# Patient Record
Sex: FEMALE | Race: BLACK OR AFRICAN AMERICAN | Employment: FULL TIME | URBAN - METROPOLITAN AREA
[De-identification: names, ages, dates, MRNs, and addresses within clinical notes are randomized per-mention and may not be internally consistent; named-entity substitution may affect disease eponyms.]

---

## 2017-01-23 ENCOUNTER — OFFICE VISIT (OUTPATIENT)
Dept: ORTHOPEDIC SURGERY | Age: 47
End: 2017-01-23

## 2017-01-23 VITALS
RESPIRATION RATE: 16 BRPM | DIASTOLIC BLOOD PRESSURE: 78 MMHG | BODY MASS INDEX: 43.99 KG/M2 | WEIGHT: 233 LBS | HEART RATE: 71 BPM | SYSTOLIC BLOOD PRESSURE: 133 MMHG | HEIGHT: 61 IN

## 2017-01-23 DIAGNOSIS — M47.812 CERVICAL SPONDYLOSIS WITHOUT MYELOPATHY: ICD-10-CM

## 2017-01-23 DIAGNOSIS — M50.20 CERVICAL HERNIATED DISC: Primary | ICD-10-CM

## 2017-01-23 DIAGNOSIS — M50.30 DDD (DEGENERATIVE DISC DISEASE), CERVICAL: ICD-10-CM

## 2017-01-23 DIAGNOSIS — M54.12 CERVICAL NEURITIS: ICD-10-CM

## 2017-01-23 RX ORDER — TRAMADOL HYDROCHLORIDE 50 MG/1
TABLET ORAL
Refills: 0 | COMMUNITY
Start: 2017-01-09 | End: 2019-05-06 | Stop reason: SDUPTHER

## 2017-01-23 RX ORDER — GABAPENTIN 300 MG/1
300 CAPSULE ORAL
Qty: 90 CAP | Refills: 1 | Status: SHIPPED | OUTPATIENT
Start: 2017-01-23

## 2017-01-23 RX ORDER — TRIAMCINOLONE ACETONIDE 55 UG/1
1 SPRAY, METERED NASAL AS NEEDED
COMMUNITY
Start: 2016-07-07 | End: 2017-03-20

## 2017-01-23 RX ORDER — DIAZEPAM 5 MG/1
TABLET ORAL
Refills: 1 | COMMUNITY
Start: 2017-01-20 | End: 2017-03-20

## 2017-01-23 RX ORDER — NAPROXEN 500 MG/1
TABLET ORAL
Refills: 2 | COMMUNITY
Start: 2016-12-15 | End: 2017-03-20

## 2017-01-23 RX ORDER — TIZANIDINE HYDROCHLORIDE 6 MG/1
CAPSULE, GELATIN COATED ORAL
Refills: 1 | COMMUNITY
Start: 2017-01-03 | End: 2019-05-06 | Stop reason: SDUPTHER

## 2017-01-23 RX ORDER — GABAPENTIN 100 MG/1
CAPSULE ORAL 2 TIMES DAILY
Refills: 0 | COMMUNITY
Start: 2017-01-06 | End: 2017-03-10

## 2017-01-23 RX ORDER — TRIAMCINOLONE ACETONIDE 1 MG/G
CREAM TOPICAL
Refills: 1 | COMMUNITY
Start: 2017-01-18 | End: 2017-03-20

## 2017-01-23 NOTE — MR AVS SNAPSHOT
Visit Information Date & Time Provider Department Dept. Phone Encounter #  
 1/23/2017  8:50 AM Lena Siegel MD 4 [facility], [address] [facility] [place] [phone] [id number] Follow-up Instructions Return if symptoms worsen or fail to improve. Upcoming Health Maintenance Date Due DTaP/Tdap/Td series (1 - Tdap) 11/6/1992 PAP AKA CERVICAL CYTOLOGY 11/6/1992 INFLUENZA AGE 9 TO ADULT 8/1/2016 Allergies as of 1/23/2017  Review Complete On: 1/23/2017 By: Lena Siegel MD  
  
 Severity Noted Reaction Type Reactions Iodine  03/06/2014    Hives Oxycodone  10/10/2011    Other (comments) Penicillin G  10/10/2011    Other (comments) Current Immunizations  Never Reviewed No immunizations on file. Not reviewed this visit You Were Diagnosed With   
  
 Codes Comments Cervical herniated disc    -  Primary ICD-10-CM: M50.20 ICD-9-CM: 722.0 Cervical spondylosis without myelopathy     ICD-10-CM: P97.432 ICD-9-CM: 721.0 Cervical neuritis     ICD-10-CM: M54.12 
ICD-9-CM: 723.4 DDD (degenerative disc disease), cervical     ICD-10-CM: M50.30 ICD-9-CM: 722.4 Vitals BP Pulse Resp Height(growth percentile) Weight(growth percentile) BMI  
 133/78 (BP 1 Location: Left arm, BP Patient Position: Sitting) 71 16 5' 1\" (1.549 m) 233 lb (105.7 kg) 44.02 kg/m2 Smoking Status Never Smoker BMI and BSA Data Body Mass Index Body Surface Area 44.02 kg/m 2 2.13 m 2 Preferred Pharmacy Pharmacy Name Phone [facility] [address] [place] [address] AT [facility] [phone] Your Updated Medication List  
  
   
This list is accurate as of: 1/23/17 10:20 AM.  Always use your most recent med list.  
  
  
  
  
 diazePAM 5 mg tablet Commonly known as:  VALIUM TK ONE T PO Q 6 HOURS PRN  
  
 * gabapentin 100 mg capsule Commonly known as:  NEURONTIN  
two (2) times a day. * gabapentin 300 mg capsule Commonly known as:  NEURONTIN Take 1 Cap by mouth three (3) times daily (with meals). Indications: NEUROPATHIC PAIN  
  
 naproxen 500 mg tablet Commonly known as:  NAPROSYN  
TK 1 T PO BID PRN P  
  
 tiZANidine 6 mg capsule Commonly known as:  Elenore Piggs TK 1 C PO TID PRN  
  
 traMADol 50 mg tablet Commonly known as:  ULTRAM  
TK 1 T PO  Q 6 H PRN  
  
 * triamcinolone 55 mcg nasal inhaler Commonly known as:  NASACORT AQ  
1 Spray. * triamcinolone acetonide 0.1 % topical cream  
Commonly known as:  KENALOG  
AARON AA BID * Notice: This list has 4 medication(s) that are the same as other medications prescribed for you. Read the directions carefully, and ask your doctor or other care provider to review them with you. Prescriptions Sent to Pharmacy Refills  
 gabapentin (NEURONTIN) 300 mg capsule 1 Sig: Take 1 Cap by mouth three (3) times daily (with meals). Indications: NEUROPATHIC PAIN Class: Normal  
 Pharmacy: [facility] [address] [place] [address] [facility] Ph #: [phone] Route: Oral  
  
We Performed the Following REFERRAL TO SPINE SURGERY [UQT469 Custom] Comments: EVAL AND TREAT Follow-up Instructions Return if symptoms worsen or fail to improve. Referral Information Referral ID Referred By Referred To  
  
 [id number] Elkin Mast MD [address] [facility] [address] [facility], [address] Phone: [phone] Fax: [phone] Visits Status Start Date End Date 1 New Request 1/23/17 1/23/18 If your referral has a status of pending review or denied, additional information will be sent to support the outcome of this decision. Introducing [facility]!    
 Estefany [employer] introduces [employer] patient portal. Now you can access parts of your medical record, email your doctor's office, and request medication refills online. 1. In your internet browser, go to [email or web address] 2. Click on the First Time User? Click Here link in the Sign In box. You will see the New Member Sign Up page. 3. Enter your [email or web address] Access Code exactly as it appears below. You will not need to use this code after youve completed the sign-up process. If you do not sign up before the expiration date, you must request a new code. · [email or web address] Access Code: [email or web address] Expires: 4/23/2017  8:51 AM 
 
4. Enter the last four digits of your Social Security Number (xxxx) and Date of Birth (mm/dd/yyyy) as indicated and click Submit. You will be taken to the next sign-up page. 5. Create a [email or web address] ID. This will be your [email or web address] login ID and cannot be changed, so think of one that is secure and easy to remember. 6. Create a [email or web address] password. You can change your password at any time. 7. Enter your Password Reset Question and Answer. This can be used at a later time if you forget your password. 8. Enter your e-mail address. You will receive e-mail notification when new information is available in [address] 9. Click Sign Up. You can now view and download portions of your medical record. 10. Click the Download Summary menu link to download a portable copy of your medical information. If you have questions, please visit the Frequently Asked Questions section of the [email or web address] website. Remember, [email or web address] is NOT to be used for urgent needs. For medical emergencies, dial 911. Now available from your iPhone and Android! Please provide this summary of care documentation to your next provider. Your primary care clinician is listed as Olayinka Vigil. If you have any questions after today's visit, please call [phone].

## 2017-01-23 NOTE — PROGRESS NOTES
[facility]  [address]  [address], [place]  Phone: [phone]  Fax: [phone]        INITIAL CONSULTATION      HISTORY OF PRESENT ILLNESS:  Alex Downs is a 40 y.o. female whom is referred from Ronel Chaney NP secondary to progressive neck pain into the BUE (L>R) into the shoulders since October 2016. Patient can replicate her symptoms with cervical extension. She rates pain 4/10. Patient notes initial onset of symptoms roughly 9 years ago which has been intermittent since. No specific injury. She has flare ups every couple of months that are more frequent now. She denies hx of cervical spinal surgery or injections. Patient reports of \"temporary paralysis. \" Note from Ronel Chaney NP 1/3/17 indicating patient was seen for neck pain. Of note, patient noted improvement with medications Tramadol. Patient was also started on Prednisone and prescribed Zanaflex without relief. Referral note from Marvin Joshua PA-C dated 1/4/17 indicating patient has been referred to me to evaluate neck pain extending into the UE. ER note dated 1/8/17 indicating patient presented with neck pain into the BUE. Physical therapy notes were reviewed. She completes physical therapy without relief. Patient continues her HEP daily. She is taking Neurontin 100 mg BID which she tolerates without relief. She denies possibility of pregnancy or breastfeeding due to hx of tubal ligation and hysterectomy. Patient denies change in bowel or bladder habits. Patient denies fever, weight loss, or skin changes. She denies hx of DM. Cervical spine XR dated 12/15/16 per report revealed no significant abnormalities. Cervical spine MRI dated 1/19/17 reviewed. Per report, severe central canal stenosis and associated spinal cord compression at C3-C4 due to large disc protrusion. Elsewhere, no central canal stenosis. Degenerative changes. Subtle signal changes involving the cervical spinal cord at C3-C4. The patient is RH dominant.  reviewed. History reviewed. No pertinent past medical history. Hx c  Past Surgical History   Procedure Laterality Date    Hx  section       x 3    Hx cholecystectomy      Hx hernia repair     esarean section       x 3    Hx cholecystectomy      Hx hernia repair        Substance Use Topics    Smoking status: Never Smoker    Smokeless tobacco: Never Used    Alcohol use No     Work status: The patient is employed. Marital status: Not available. Allergies   Allergen Reactions    Iodine Hives    Oxycodone Other (comments)    Penicillin G Other (comments)            Family History   Problem Relation Age of Onset    Family history unknown: Yes         REVIEW OF SYSTEMS  Constitutional symptoms: Negative  Eyes: Negative  Ears, Nose, Throat, and Mouth: Negative  Cardiovascular: Negative  Respiratory: Negative  Genitourinary: Negative  Integumentary (Skin and/or breast): Negative  Musculoskeletal: Positive for neck pain into the BUE. Extremities: Negative for edema. Endocrine/Rheumatologic: Negative  Hematologic/Lymphatic: Negative  Allergic/Immunologic: Negative  Psychiatric: Negative       PHYSICAL EXAMINATION    Visit Vitals    /78 (BP 1 Location: Left arm, BP Patient Position: Sitting)    Pulse 71    Resp 16    Ht 5' 1\" (1.549 m)    Wt 233 lb (105.7 kg)    BMI 44.02 kg/m2       CONSTITUTIONAL: NAD, A&O x 3  HEART: Regular rate and rhythm  ABDOMEN: Positive bowel sounds, soft, nontender, and nondistended  LUNGS: Clear to auscultation bilaterally. RANGE OF MOTION: The patient has full passive range of motion in all four extremities. SENSATION: Sensation is intact to light touch throughout. MOTOR:   Straight Leg Raise: Negative, bilateral  Crawley: Negative, bilateral  Tandem Gait: Moderate LOB   Deep tendon reflexes are 0 at the brachioradialis, biceps, and triceps. Deep tendon reflexes are 0 at the knees and ankles bilaterally.      Shoulder AB/Flex Elbow Flex Wrist Ext Elbow Ext Wrist Flex Hand Intrin Tone   Right +4/5 +4/5 +4/5 +4/5 +4/5 +4/5 +4/5   Left +4/5 +4/5 +4/5 +4/5 +4/5 +4/5 +4/5              Hip Flex Knee Ext Knee Flex Ankle DF GTE Ankle PF Tone   Right +4/5 +4/5 +4/5 +4/5 +4/5 +4/5 +4/5   Left +4/5 +4/5 +4/5 +4/5 +4/5 +4/5 +4/5       ASSESSMENT   Carmen was seen today for neck pain, arm pain, leg pain, hip pain and numbness. Diagnoses and all orders for this visit:    Cervical herniated disc  -     REFERRAL TO SPINE SURGERY  -     gabapentin (NEURONTIN) 300 mg capsule; Take 1 Cap by mouth three (3) times daily (with meals). Indications: NEUROPATHIC PAIN    Cervical spondylosis without myelopathy  -     REFERRAL TO SPINE SURGERY  -     gabapentin (NEURONTIN) 300 mg capsule; Take 1 Cap by mouth three (3) times daily (with meals). Indications: NEUROPATHIC PAIN    Cervical neuritis  -     REFERRAL TO SPINE SURGERY  -     gabapentin (NEURONTIN) 300 mg capsule; Take 1 Cap by mouth three (3) times daily (with meals). Indications: NEUROPATHIC PAIN    DDD (degenerative disc disease), cervical  -     REFERRAL TO SPINE SURGERY  -     gabapentin (NEURONTIN) 300 mg capsule; Take 1 Cap by mouth three (3) times daily (with meals). Indications: NEUROPATHIC PAIN           IMPRESSIONS/RECOMMENDATIONS:  The patient presents with neck pain into the bilateral upper extremities, left worse than right. Patient has subtle signal changes note on her cervical spine MRI with loss of balance noted on tandem gait. I will be referring patient to Dr. Nohemi Luciano for surgical evaluation. Patient has been instructed to bring her cervical spine MRI films to her visit with Dr. Frankie Stewart. She has been tolerating Neurontin 100 mg BID. I will increase the dose of her Neurontin to 300 mg TID. Patient advised to call the office if intolerant to higher dose. I encourage patient to perform her HEP daily. I will see the patient back on an as-needed basis.        Written by Tang Scott, Dennis Henderson, as dictated by Lynnette Ahmadi MD  I examined the patient, reviewed and agree with the note.

## 2017-02-24 ENCOUNTER — OFFICE VISIT (OUTPATIENT)
Dept: ORTHOPEDIC SURGERY | Age: 47
End: 2017-02-24

## 2017-02-24 VITALS
RESPIRATION RATE: 18 BRPM | SYSTOLIC BLOOD PRESSURE: 136 MMHG | HEART RATE: 93 BPM | BODY MASS INDEX: 43.99 KG/M2 | TEMPERATURE: 98.4 F | DIASTOLIC BLOOD PRESSURE: 68 MMHG | OXYGEN SATURATION: 98 % | WEIGHT: 233 LBS | HEIGHT: 61 IN

## 2017-02-24 DIAGNOSIS — M48.02 CERVICAL SPINAL STENOSIS: ICD-10-CM

## 2017-02-24 DIAGNOSIS — M50.20 HNP (HERNIATED NUCLEUS PULPOSUS), CERVICAL: Primary | ICD-10-CM

## 2017-02-24 RX ORDER — PREDNISONE 20 MG/1
TABLET ORAL
Refills: 0 | COMMUNITY
Start: 2016-12-30 | End: 2017-03-10

## 2017-02-24 NOTE — PROGRESS NOTES
[address] [facility]   Pre-Surgical Worksheet    Patient: Marissa Waggoner                         MRN: [id number]     Age:  40 y.o.,      Sex: female    YOB: 1970           HECTOR: February 24, 2017  PCP: Murphy De Anda MD    Allergies   Allergen Reactions    Iodine Hives    Oxycodone Other (comments)    Penicillin G Other (comments)         ICD-10-CM ICD-9-CM    1. HNP (herniated nucleus pulposus), cervical M50.20 722.0 AMB POC XRAY, SPINE, CERVICAL; 2 OR 3   2. Cervical spinal stenosis M48.02 723.0 AMB POC XRAY, SPINE, CERVICAL; 2 OR 3       Surgery: C3/4 ARTHROPLASY. Pain Assessment   Pain Assessment  2/24/2017   Location of Pain Neck   Location Modifiers -   Severity of Pain 4   Quality of Pain Aching   Quality of Pain Comment numbness, tingling   Duration of Pain -   Frequency of Pain Constant   Aggravating Factors (No Data)   Aggravating Factors Comment increased activity   Limiting Behavior -   Relieving Factors (No Data)   Relieving Factors Comment pain med   Result of Injury No   Work-Related Injury -       Visit Vitals    /68    Pulse 93    Temp 98.4 °F (36.9 °C) (Oral)    Resp 18    Ht 5' 1\" (1.549 m)    Wt 233 lb (105.7 kg)    SpO2 98%    BMI 44.02 kg/m2       ADL Limits: Bathing and Dressing. PT STATES HER PAIN LIMITS HER MOBILITY AND CAUSES DISCOMFORT WITH CERTAIN ACTIVITIES. Spine Surgery?: No:  When N/A. Where N/A. Spinal Injections?: No:   When N/A. Where N/A. Physical Therapy?: Yes  When 3495-9559. Where UNKNOWN.    NSAID's?: YES:     Pain Medications?: Yes  Type: TRAMADOL, ZANAFLEX. In Pain Management: NO, Where: N/A    Current Outpatient Prescriptions   Medication Sig    traMADol (ULTRAM) 50 mg tablet TK 1 T PO  Q 6 H PRN    tiZANidine (ZANAFLEX) 6 mg capsule TK 1 C PO TID PRN    naproxen (NAPROSYN) 500 mg tablet TK 1 T PO BID PRN P    gabapentin (NEURONTIN) 300 mg capsule Take 1 Cap by mouth three (3) times daily (with meals). Indications: NEUROPATHIC PAIN    predniSONE (DELTASONE) 20 mg tablet     triamcinolone acetonide (KENALOG) 0.1 % topical cream AARON AA BID    gabapentin (NEURONTIN) 100 mg capsule two (2) times a day.  diazePAM (VALIUM) 5 mg tablet TK ONE T PO Q 6 HOURS PRN    triamcinolone (NASACORT AQ) 55 mcg nasal inhaler 1 Spray. No current facility-administered medications for this visit. History reviewed. No pertinent past medical history.     Past Surgical History:   Procedure Laterality Date    HX  SECTION      x 3    HX CHOLECYSTECTOMY      HX HERNIA REPAIR         Social History     Social History    Marital status: UNKNOWN     Spouse name: N/A    Number of children: N/A    Years of education: N/A     Social History Main Topics    Smoking status: Never Smoker    Smokeless tobacco: Never Used    Alcohol use No    Drug use: No    Sexual activity: Not Asked     Other Topics Concern    None     Social History Narrative

## 2017-02-24 NOTE — PROGRESS NOTES
without assistive device    [facility]  [address], [address] [facility], [address] [place] [address] Street  Phone: [phone]  Fax: [phone]  INITIAL CONSULTATION  Patient: Shayla Proctor                MRN: [id number]       SSN: [id number]  YOB: 1970        AGE: 40 y.o. SEX: female  Body mass index is 44.02 kg/(m^2). PCP: Madiha Simpson MD  02/24/17    Chief Complaint   Patient presents with    Neck Pain     neck pain eval    Referral / Consult         HISTORY OF PRESENT ILLNESS, RADIOGRAPHS, and PLAN:         HISTORY OF PRESENT ILLNESS:  Ms. Leidy Macdonald is seen today at the request of Dr. Suresh Kapoor and Dr. Madiha Simpson. Ms. Leidy Macdonald is a 28-year-old female who works for the [employer]. She has had since October an onset of neck pain with left greater than right arm pain, loss of dexterity, balance, and numbness and tingling. The pain has progressed despite medications and therapy. MRIs have been done, which disclose a large extruded disc herniation at C3-4 with cord compression and myelomalacia. The patient is referred for consideration of surgical intervention. PHYSICAL EXAMINATION:  Physical exam demonstrates antalgic range of motion of her cervical spine, weakness and numbness in her hands, left worse than right, loss of dexterity, and poor tandem gait. There is no hyperreflexia. Biceps, triceps, and some intrinsic weakness on the left. No Crawleys or Babinski. RADIOGRAPHS:  Radiographs, by report, demonstrate a large extruded disc herniation at C3-4 with myelomalacia. The films were not available for my direct review, and I have requested them. ASSESSMENT/PLAN: We have a patient with an extruded disc herniation at C3-4 with cord compression. I am requesting the films for my direct review. Her exam is consistent with a cervical disc herniation with myelopathic findings.   I discussed the studies findings and her physical exam findings and her history with her. Given that I agree with the radiologists finding, she would be a candidate for a cervical decompression and cervical disc arthroplasty at C3-4. I discussed arthroplasty versus fusion, as well as the natural history of cervical cord compression with myelomalacia due to disc herniation. Risks, benefits, complications, and alternatives to surgery were discussed, and the patient consents to surgery. We will proceed pending my ultimate review of the actual studies. cc: Vinicio Arnold M.D. History reviewed. No pertinent past medical history. Family History   Problem Relation Age of Onset    Family history unknown: Yes       Current Outpatient Prescriptions   Medication Sig Dispense Refill    traMADol (ULTRAM) 50 mg tablet TK 1 T PO  Q 6 H PRN  0    tiZANidine (ZANAFLEX) 6 mg capsule TK 1 C PO TID PRN  1    naproxen (NAPROSYN) 500 mg tablet TK 1 T PO BID PRN P  2    gabapentin (NEURONTIN) 300 mg capsule Take 1 Cap by mouth three (3) times daily (with meals). Indications: NEUROPATHIC PAIN 90 Cap 1    predniSONE (DELTASONE) 20 mg tablet   0    triamcinolone acetonide (KENALOG) 0.1 % topical cream AARON AA BID  1    gabapentin (NEURONTIN) 100 mg capsule two (2) times a day.  0    diazePAM (VALIUM) 5 mg tablet TK ONE T PO Q 6 HOURS PRN  1    triamcinolone (NASACORT AQ) 55 mcg nasal inhaler 1 Spray. Allergies   Allergen Reactions    Iodine Hives    Oxycodone Other (comments)    Penicillin G Other (comments)       Past Surgical History:   Procedure Laterality Date    HX  SECTION      x 3    HX CHOLECYSTECTOMY      HX HERNIA REPAIR         History reviewed. No pertinent past medical history. Social History     Social History    Marital status: UNKNOWN     Spouse name: N/A    Number of children: N/A    Years of education: N/A     Occupational History    Not on file.      Social History Main Topics    Smoking status: Never Smoker    Smokeless tobacco: Never Used    Alcohol use No    Drug use: No    Sexual activity: Not on file     Other Topics Concern    Not on file     Social History Narrative         REVIEW OF SYSTEMS:   CONSTITUTIONAL SYMPTOMS:  Negative. EYES:  Negative. EARS, NOSE, THROAT AND MOUTH:  Negative. CARDIOVASCULAR:  Negative. RESPIRATORY:  Negative. GENITOURINARY: Negative. GASTROINTESTINAL:  Negative. INTEGUMENTARY (SKIN AND/OR BREAST):  Negative. MUSCULOSKELETAL: Per HPI.   ENDOCRINE/RHEUMATOLOGIC:  Negative. NEUROLOGICAL:  Per HPI. HEMATOLOGIC/LYMPHATIC:  Negative. ALLERGIC/IMMUNOLOGIC:  Negative. PSYCHIATRIC:  Negative. PHYSICAL EXAMINATION:   Visit Vitals    /68    Pulse 93    Temp 98.4 °F (36.9 °C) (Oral)    Resp 18    Ht 5' 1\" (1.549 m)    Wt 233 lb (105.7 kg)    SpO2 98%    BMI 44.02 kg/m2    PAIN SCALE: 4/10    CONSTITUTIONAL: The patient is in no apparent distress and is alert and oriented x 3. Obese. HEENT: Normocephalic. Hearing grossly intact. NECK: Supple and symmetric. no tenderness, or masses were felt. RESPIRATORY: No labored breathing. CARDIOVASCULAR: The carotid pulses were normal. Peripheral pulses were 2+. CHEST: Normal AP diameter and normal contour without any kyphoscoliosis. LYMPHATIC: No lymphadenopathy was appreciated in the neck, axillae or groin. SKIN:  Negative for scars, rashes, lesions, or ulcers on the right upper, right lower, left upper, left lower and trunk. NEUROLOGICAL: Alert and oriented x 3. Ambulation without assistive device. FWB. EXTREMITIES:  See musculoskeletal.  MUSCULOSKELETAL:   Head and Neck:  Neck pain that goes into LUE. Balance issues. Negative for misalignment, asymmetry, crepitation, defects, tenderness masses or effusions.  Left Upper Extremity: Pain. Paresthesia in hand. Drops objects. Inspection, percussion and palpation preformed. Crawleys sign is negative.  Right Upper Extremity: Paresthesia in hand.  Drops objects. Inspection, percussion and palpation preformed. Crawleys sign is negative.  Spine, Ribs and Pelvis: Inspection, percussion and palpation preformed. Negative for misalignment, asymmetry, crepitation, defects, tenderness masses or effusions.  Left Lower Extremity: Inspection, percussion and palpation preformed. Negative straight leg raise.  Right Lower Extremity: Inspection, percussion and palpation preformed. Negative straight leg raise. SPINE EXAM:     Cervical spine: Neck is midline. Normal muscle tone. No focal atrophy is noted. ASSESSMENT    ICD-10-CM ICD-9-CM    1. HNP (herniated nucleus pulposus), cervical M50.20 722.0 AMB POC XRAY, SPINE, CERVICAL; 2 OR 3   2.  Cervical spinal stenosis M48.02 723.0 AMB POC XRAY, SPINE, CERVICAL; 2 OR 3       Written by Kamilla Ortiz, as dictated by Joseph Galvan MD.

## 2017-03-09 NOTE — H&P
Pre-Admission History and Physical    Patient: Farideh Staton   MRN: [id number]   SSN: [id number]   YOB: 1970   Age: 40 y.o. Sex: female     Patient scheduled for: C3-4 arthroplasty. Date of surgery: 3/16/17. Location of surgery: [facility]. Surgeon: Kam Sheehan MD    HPI:  Farideh Staton is a 40 y.o. female with neck pain with left greater than right arm pain, loss of dexterity, balance, and numbness and tingling. The pain has progressed despite medications and therapy. He reports a pain level of 4/10. MRI demonstrates a large extruded disc herniation at C3-4 with myelomalacia. This patient has failed the presurgical conservative treatments  including physical therapy and medications. Pain has impacted the patient's functional ability to bathe and dress as the pain limits her mobility. She is being admitted for surgical intervention. No past medical history on file. Social History     Social History    Marital status: UNKNOWN     Spouse name: N/A    Number of children: N/A    Years of education: N/A     Social History Main Topics    Smoking status: Never Smoker    Smokeless tobacco: Never Used    Alcohol use No    Drug use: No    Sexual activity: Not on file     Other Topics Concern    Not on file     Social History Narrative     Past Surgical History:   Procedure Laterality Date    HX  SECTION      x 3    HX CHOLECYSTECTOMY      HX HERNIA REPAIR       Family History   Problem Relation Age of Onset    Family history unknown:  Yes     Allergies   Allergen Reactions    Iodine Hives    Oxycodone Other (comments)    Penicillin G Other (comments)     Current Outpatient Prescriptions   Medication Sig Dispense Refill    predniSONE (DELTASONE) 20 mg tablet   0    triamcinolone acetonide (KENALOG) 0.1 % topical cream AARON AA BID  1    traMADol (ULTRAM) 50 mg tablet TK 1 T PO  Q 6 H PRN  0    tiZANidine (ZANAFLEX) 6 mg capsule TK 1 C PO TID PRN  1  naproxen (NAPROSYN) 500 mg tablet TK 1 T PO BID PRN P  2    gabapentin (NEURONTIN) 100 mg capsule two (2) times a day.  0    diazePAM (VALIUM) 5 mg tablet TK ONE T PO Q 6 HOURS PRN  1    triamcinolone (NASACORT AQ) 55 mcg nasal inhaler 1 Spray.  gabapentin (NEURONTIN) 300 mg capsule Take 1 Cap by mouth three (3) times daily (with meals). Indications: NEUROPATHIC PAIN 90 Cap 1       ROS:  Denies chills, fever,night sweats,  bowel or bladder dysfunction, unexplained weight loss/weight gain, chest pain, sob or anxiety. Physical Examination    Gen: Well developed, well nourished 40 y.o. female    /68    Pulse 93    Temp 98.4 °F (36.9 °C) (Oral)    Resp 18    Ht 5' 1\" (1.549 m)    Wt 233 lb (105.7 kg)    SpO2 98%    BMI 44.02 kg/m2    PAIN SCALE: 4/10     CONSTITUTIONAL: The patient is in no apparent distress and is alert and oriented x 3. Obese. HEENT: Normocephalic. Hearing grossly intact. NECK: Supple and symmetric. no tenderness, or masses were felt. RESPIRATORY: No labored breathing. CARDIOVASCULAR: The carotid pulses were normal. Peripheral pulses were 2+. CHEST: Normal AP diameter and normal contour without any kyphoscoliosis. LYMPHATIC: No lymphadenopathy was appreciated in the neck, axillae or groin. SKIN: Negative for scars, rashes, lesions, or ulcers on the right upper, right lower, left upper, left lower and trunk. NEUROLOGICAL: Alert and oriented x 3. Ambulation without assistive device. FWB. EXTREMITIES: See musculoskeletal.  MUSCULOSKELETAL:  · Head and Neck: Neck pain that goes into LUE. Balance issues. Negative for misalignment, asymmetry, crepitation, defects, tenderness masses or effusions. · Left Upper Extremity: Pain. Paresthesia in hand. Drops objects. Inspection, percussion and palpation preformed. Crawleys sign is negative. · Right Upper Extremity: Paresthesia in hand. Drops objects. Inspection, percussion and palpation preformed.  Crawleys sign is negative. · Spine, Ribs and Pelvis: Inspection, percussion and palpation preformed. Negative for misalignment, asymmetry, crepitation, defects, tenderness masses or effusions. · Left Lower Extremity: Inspection, percussion and palpation preformed. Negative straight leg raise. · Right Lower Extremity: Inspection, percussion and palpation preformed. Negative straight leg raise.        SPINE EXAM:      Cervical spine: Neck is midline. Normal muscle tone. No focal atrophy is noted.              Assessment and Plan    Due to the pt's persistent symptoms unrelieved by conservative measure Andi Hansen is being admitted to [facility] to undergo surgical intervention. The post-operative plan of care consists of physical therapy, home health and a 2 week f/u office visit. We are pending medical clearance by Dr. Naif Everett. The risks, benefits, complications and alternatives to surgery have been discussed in detail with the patient. The patient understands and agrees to proceed.      Deisy Perez NP-C dictating for Imani Butler MD

## 2017-03-10 ENCOUNTER — HOSPITAL ENCOUNTER (OUTPATIENT)
Dept: PREADMISSION TESTING | Age: 47
Discharge: HOME | End: 2017-03-10
Payer: COMMERCIAL

## 2017-03-10 DIAGNOSIS — Z01.818 PRE-OP EXAM: ICD-10-CM

## 2017-03-10 LAB
ALBUMIN SERPL BCP-MCNC: 3.5 G/DL (ref 3.4–5)
ALBUMIN/GLOB SERPL: 1 {RATIO} (ref 0.8–1.7)
ALP SERPL-CCNC: 86 U/L (ref 45–117)
ALT SERPL-CCNC: 16 U/L (ref 13–56)
ANION GAP BLD CALC-SCNC: 4 MMOL/L (ref 3–18)
AST SERPL W P-5'-P-CCNC: 14 U/L (ref 15–37)
BILIRUB SERPL-MCNC: 0.3 MG/DL (ref 0.2–1)
BUN SERPL-MCNC: 9 MG/DL (ref 7–18)
BUN/CREAT SERPL: 15 (ref 12–20)
CALCIUM SERPL-MCNC: 8 MG/DL (ref 8.5–10.1)
CHLORIDE SERPL-SCNC: 102 MMOL/L (ref 100–108)
CO2 SERPL-SCNC: 32 MMOL/L (ref 21–32)
CREAT SERPL-MCNC: 0.6 MG/DL (ref 0.6–1.3)
ERYTHROCYTE [DISTWIDTH] IN BLOOD BY AUTOMATED COUNT: 14.8 % (ref 11.6–14.5)
GLOBULIN SER CALC-MCNC: 3.6 G/DL (ref 2–4)
GLUCOSE SERPL-MCNC: 89 MG/DL (ref 74–99)
HCT VFR BLD AUTO: 37.4 % (ref 35–45)
HGB BLD-MCNC: 11.6 G/DL (ref 12–16)
MCH RBC QN AUTO: 23.6 PG (ref 24–34)
MCHC RBC AUTO-ENTMCNC: 31 G/DL (ref 31–37)
MCV RBC AUTO: 76 FL (ref 74–97)
PLATELET # BLD AUTO: 234 K/UL (ref 135–420)
PMV BLD AUTO: 9.9 FL (ref 9.2–11.8)
POTASSIUM SERPL-SCNC: 3.4 MMOL/L (ref 3.5–5.5)
PROT SERPL-MCNC: 7.1 G/DL (ref 6.4–8.2)
RBC # BLD AUTO: 4.92 M/UL (ref 4.2–5.3)
SODIUM SERPL-SCNC: 138 MMOL/L (ref 136–145)
WBC # BLD AUTO: 4 K/UL (ref 4.6–13.2)

## 2017-03-10 PROCEDURE — 80053 COMPREHEN METABOLIC PANEL: CPT | Performed by: ORTHOPAEDIC SURGERY

## 2017-03-10 PROCEDURE — 85027 COMPLETE CBC AUTOMATED: CPT | Performed by: ORTHOPAEDIC SURGERY

## 2017-03-10 PROCEDURE — 93005 ELECTROCARDIOGRAM TRACING: CPT

## 2017-03-10 PROCEDURE — 36415 COLL VENOUS BLD VENIPUNCTURE: CPT | Performed by: ORTHOPAEDIC SURGERY

## 2017-03-11 LAB
ATRIAL RATE: 70 BPM
CALCULATED P AXIS, ECG09: 49 DEGREES
CALCULATED R AXIS, ECG10: 8 DEGREES
CALCULATED T AXIS, ECG11: 41 DEGREES
DIAGNOSIS, 93000: NORMAL
P-R INTERVAL, ECG05: 166 MS
Q-T INTERVAL, ECG07: 384 MS
QRS DURATION, ECG06: 86 MS
QTC CALCULATION (BEZET), ECG08: 414 MS
VENTRICULAR RATE, ECG03: 70 BPM

## 2017-03-15 ENCOUNTER — ANESTHESIA EVENT (OUTPATIENT)
Dept: SURGERY | Age: 47
End: 2017-03-15
Payer: COMMERCIAL

## 2017-03-16 ENCOUNTER — SURGERY (OUTPATIENT)
Age: 47
End: 2017-03-16

## 2017-03-16 ENCOUNTER — DOCUMENTATION ONLY (OUTPATIENT)
Dept: ORTHOPEDIC SURGERY | Age: 47
End: 2017-03-16

## 2017-03-16 ENCOUNTER — HOSPITAL ENCOUNTER (OUTPATIENT)
Age: 47
Setting detail: OBSERVATION
Discharge: HOME | End: 2017-03-17
Attending: ORTHOPAEDIC SURGERY | Admitting: ORTHOPAEDIC SURGERY
Payer: COMMERCIAL

## 2017-03-16 ENCOUNTER — APPOINTMENT (OUTPATIENT)
Dept: GENERAL RADIOLOGY | Age: 47
End: 2017-03-16
Attending: ORTHOPAEDIC SURGERY
Payer: COMMERCIAL

## 2017-03-16 ENCOUNTER — ANESTHESIA (OUTPATIENT)
Dept: SURGERY | Age: 47
End: 2017-03-16
Payer: COMMERCIAL

## 2017-03-16 LAB — HCG UR QL: NEGATIVE

## 2017-03-16 PROCEDURE — 76010000131 HC OR TIME 2 TO 2.5 HR: Performed by: ORTHOPAEDIC SURGERY

## 2017-03-16 PROCEDURE — 74011250636 HC RX REV CODE- 250/636

## 2017-03-16 PROCEDURE — 76060000035 HC ANESTHESIA 2 TO 2.5 HR: Performed by: ORTHOPAEDIC SURGERY

## 2017-03-16 PROCEDURE — 99218 HC RM OBSERVATION: CPT

## 2017-03-16 PROCEDURE — 74011250637 HC RX REV CODE- 250/637: Performed by: ORTHOPAEDIC SURGERY

## 2017-03-16 PROCEDURE — 77030012893: Performed by: ORTHOPAEDIC SURGERY

## 2017-03-16 PROCEDURE — 77030014005 HC SPNG HEMSTAT GEL CARD -B: Performed by: ORTHOPAEDIC SURGERY

## 2017-03-16 PROCEDURE — 77030013079 HC BLNKT BAIR HGGR 3M -A: Performed by: ANESTHESIOLOGY

## 2017-03-16 PROCEDURE — 77030027138 HC INCENT SPIROMETER -A

## 2017-03-16 PROCEDURE — 77030032575 HC DISC CERV MOBI-C ZIMM -K1: Performed by: ORTHOPAEDIC SURGERY

## 2017-03-16 PROCEDURE — 74011250636 HC RX REV CODE- 250/636: Performed by: NURSE ANESTHETIST, CERTIFIED REGISTERED

## 2017-03-16 PROCEDURE — 77030020782 HC GWN BAIR PAWS FLX 3M -B: Performed by: ORTHOPAEDIC SURGERY

## 2017-03-16 PROCEDURE — 77030011640 HC PAD GRND REM COVD -A: Performed by: ORTHOPAEDIC SURGERY

## 2017-03-16 PROCEDURE — 74011000258 HC RX REV CODE- 258: Performed by: NURSE PRACTITIONER

## 2017-03-16 PROCEDURE — 77030004402 HC BUR NEUR STRY -C: Performed by: ORTHOPAEDIC SURGERY

## 2017-03-16 PROCEDURE — 74011000258 HC RX REV CODE- 258: Performed by: ORTHOPAEDIC SURGERY

## 2017-03-16 PROCEDURE — 74011250636 HC RX REV CODE- 250/636: Performed by: NURSE PRACTITIONER

## 2017-03-16 PROCEDURE — 77030032490 HC SLV COMPR SCD KNE COVD -B: Performed by: ORTHOPAEDIC SURGERY

## 2017-03-16 PROCEDURE — 77030030163 HC BN WAX J&J -A: Performed by: ORTHOPAEDIC SURGERY

## 2017-03-16 PROCEDURE — 74011250637 HC RX REV CODE- 250/637: Performed by: NURSE ANESTHETIST, CERTIFIED REGISTERED

## 2017-03-16 PROCEDURE — 74011000250 HC RX REV CODE- 250: Performed by: ORTHOPAEDIC SURGERY

## 2017-03-16 PROCEDURE — 77030033263 HC DRSG MEPILEX 16-48IN BORD MOLN -B: Performed by: ORTHOPAEDIC SURGERY

## 2017-03-16 PROCEDURE — 74011000250 HC RX REV CODE- 250

## 2017-03-16 PROCEDURE — 77030003029 HC SUT VCRL J&J -B: Performed by: ORTHOPAEDIC SURGERY

## 2017-03-16 PROCEDURE — 77030010507 HC ADH SKN DERMBND J&J -B: Performed by: ORTHOPAEDIC SURGERY

## 2017-03-16 PROCEDURE — 77030019908 HC STETH ESOPH SIMS -A: Performed by: ANESTHESIOLOGY

## 2017-03-16 PROCEDURE — 77030018836 HC SOL IRR NACL ICUM -A: Performed by: ORTHOPAEDIC SURGERY

## 2017-03-16 PROCEDURE — 74011250636 HC RX REV CODE- 250/636: Performed by: ORTHOPAEDIC SURGERY

## 2017-03-16 PROCEDURE — 77030002933 HC SUT MCRYL J&J -A: Performed by: ORTHOPAEDIC SURGERY

## 2017-03-16 PROCEDURE — L0172 CERV COL SR FOAM 2PC PRE OTS: HCPCS | Performed by: ORTHOPAEDIC SURGERY

## 2017-03-16 PROCEDURE — 76210000016 HC OR PH I REC 1 TO 1.5 HR: Performed by: ORTHOPAEDIC SURGERY

## 2017-03-16 PROCEDURE — 97162 PT EVAL MOD COMPLEX 30 MIN: CPT

## 2017-03-16 PROCEDURE — 77030011267 HC ELECTRD BLD COVD -A: Performed by: ORTHOPAEDIC SURGERY

## 2017-03-16 PROCEDURE — 81025 URINE PREGNANCY TEST: CPT

## 2017-03-16 PROCEDURE — 77030008683 HC TU ET CUF COVD -A: Performed by: ANESTHESIOLOGY

## 2017-03-16 PROCEDURE — 74011000272 HC RX REV CODE- 272: Performed by: ORTHOPAEDIC SURGERY

## 2017-03-16 DEVICE — DISC ARTIFICIAL W13XH5XL15MM CERV CO CHROM MOLYBDENUM ALLY: Type: IMPLANTABLE DEVICE | Site: ANTERIOR CERVICAL | Status: FUNCTIONAL

## 2017-03-16 RX ORDER — VANCOMYCIN HYDROCHLORIDE 1 G/20ML
INJECTION, POWDER, LYOPHILIZED, FOR SOLUTION INTRAVENOUS AS NEEDED
Status: DISCONTINUED | OUTPATIENT
Start: 2017-03-16 | End: 2017-03-16 | Stop reason: HOSPADM

## 2017-03-16 RX ORDER — HYDROMORPHONE HYDROCHLORIDE 1 MG/ML
1 INJECTION, SOLUTION INTRAMUSCULAR; INTRAVENOUS; SUBCUTANEOUS
Status: DISCONTINUED | OUTPATIENT
Start: 2017-03-16 | End: 2017-03-17 | Stop reason: HOSPADM

## 2017-03-16 RX ORDER — DEXAMETHASONE SODIUM PHOSPHATE 100 MG/10ML
10 INJECTION INTRAMUSCULAR; INTRAVENOUS EVERY 6 HOURS
Status: DISCONTINUED | OUTPATIENT
Start: 2017-03-16 | End: 2017-03-16 | Stop reason: SDUPTHER

## 2017-03-16 RX ORDER — NALOXONE HYDROCHLORIDE 0.4 MG/ML
0.4 INJECTION, SOLUTION INTRAMUSCULAR; INTRAVENOUS; SUBCUTANEOUS AS NEEDED
Status: DISCONTINUED | OUTPATIENT
Start: 2017-03-16 | End: 2017-03-16 | Stop reason: HOSPADM

## 2017-03-16 RX ORDER — PREGABALIN 50 MG/1
50 CAPSULE ORAL
Status: DISCONTINUED | OUTPATIENT
Start: 2017-03-16 | End: 2017-03-16 | Stop reason: HOSPADM

## 2017-03-16 RX ORDER — SODIUM CHLORIDE 0.9 % (FLUSH) 0.9 %
5-10 SYRINGE (ML) INJECTION AS NEEDED
Status: DISCONTINUED | OUTPATIENT
Start: 2017-03-16 | End: 2017-03-17 | Stop reason: HOSPADM

## 2017-03-16 RX ORDER — SODIUM CHLORIDE 0.9 % (FLUSH) 0.9 %
5-10 SYRINGE (ML) INJECTION AS NEEDED
Status: DISCONTINUED | OUTPATIENT
Start: 2017-03-16 | End: 2017-03-16 | Stop reason: HOSPADM

## 2017-03-16 RX ORDER — FENTANYL CITRATE 50 UG/ML
INJECTION, SOLUTION INTRAMUSCULAR; INTRAVENOUS AS NEEDED
Status: DISCONTINUED | OUTPATIENT
Start: 2017-03-16 | End: 2017-03-16 | Stop reason: HOSPADM

## 2017-03-16 RX ORDER — SODIUM CHLORIDE 0.9 % (FLUSH) 0.9 %
5-10 SYRINGE (ML) INJECTION EVERY 8 HOURS
Status: DISCONTINUED | OUTPATIENT
Start: 2017-03-16 | End: 2017-03-17 | Stop reason: HOSPADM

## 2017-03-16 RX ORDER — GABAPENTIN 300 MG/1
300 CAPSULE ORAL
Status: DISCONTINUED | OUTPATIENT
Start: 2017-03-16 | End: 2017-03-17 | Stop reason: HOSPADM

## 2017-03-16 RX ORDER — SODIUM CHLORIDE 0.9 % (FLUSH) 0.9 %
5-10 SYRINGE (ML) INJECTION EVERY 8 HOURS
Status: DISCONTINUED | OUTPATIENT
Start: 2017-03-16 | End: 2017-03-16 | Stop reason: HOSPADM

## 2017-03-16 RX ORDER — DIPHENHYDRAMINE HYDROCHLORIDE 50 MG/ML
12.5 INJECTION, SOLUTION INTRAMUSCULAR; INTRAVENOUS
Status: DISCONTINUED | OUTPATIENT
Start: 2017-03-16 | End: 2017-03-17 | Stop reason: HOSPADM

## 2017-03-16 RX ORDER — ALBUTEROL SULFATE 0.83 MG/ML
2.5 SOLUTION RESPIRATORY (INHALATION) AS NEEDED
Status: DISCONTINUED | OUTPATIENT
Start: 2017-03-16 | End: 2017-03-16 | Stop reason: HOSPADM

## 2017-03-16 RX ORDER — KETOROLAC TROMETHAMINE 30 MG/ML
INJECTION, SOLUTION INTRAMUSCULAR; INTRAVENOUS AS NEEDED
Status: DISCONTINUED | OUTPATIENT
Start: 2017-03-16 | End: 2017-03-16 | Stop reason: HOSPADM

## 2017-03-16 RX ORDER — TRAMADOL HYDROCHLORIDE 50 MG/1
50 TABLET ORAL
Status: DISCONTINUED | OUTPATIENT
Start: 2017-03-16 | End: 2017-03-17 | Stop reason: HOSPADM

## 2017-03-16 RX ORDER — LIDOCAINE HYDROCHLORIDE 20 MG/ML
INJECTION, SOLUTION EPIDURAL; INFILTRATION; INTRACAUDAL; PERINEURAL AS NEEDED
Status: DISCONTINUED | OUTPATIENT
Start: 2017-03-16 | End: 2017-03-16 | Stop reason: HOSPADM

## 2017-03-16 RX ORDER — FAMOTIDINE 20 MG/1
20 TABLET, FILM COATED ORAL ONCE
Status: COMPLETED | OUTPATIENT
Start: 2017-03-16 | End: 2017-03-16

## 2017-03-16 RX ORDER — SODIUM CHLORIDE, SODIUM LACTATE, POTASSIUM CHLORIDE, CALCIUM CHLORIDE 600; 310; 30; 20 MG/100ML; MG/100ML; MG/100ML; MG/100ML
50 INJECTION, SOLUTION INTRAVENOUS CONTINUOUS
Status: DISCONTINUED | OUTPATIENT
Start: 2017-03-16 | End: 2017-03-16 | Stop reason: HOSPADM

## 2017-03-16 RX ORDER — HYDROMORPHONE HYDROCHLORIDE 4 MG/1
4 TABLET ORAL
Status: DISCONTINUED | OUTPATIENT
Start: 2017-03-16 | End: 2017-03-17 | Stop reason: HOSPADM

## 2017-03-16 RX ORDER — NEOSTIGMINE METHYLSULFATE 1 MG/ML
INJECTION INTRAVENOUS AS NEEDED
Status: DISCONTINUED | OUTPATIENT
Start: 2017-03-16 | End: 2017-03-16 | Stop reason: HOSPADM

## 2017-03-16 RX ORDER — PROPOFOL 10 MG/ML
INJECTION, EMULSION INTRAVENOUS AS NEEDED
Status: DISCONTINUED | OUTPATIENT
Start: 2017-03-16 | End: 2017-03-16 | Stop reason: HOSPADM

## 2017-03-16 RX ORDER — ONDANSETRON 2 MG/ML
INJECTION INTRAMUSCULAR; INTRAVENOUS AS NEEDED
Status: DISCONTINUED | OUTPATIENT
Start: 2017-03-16 | End: 2017-03-16 | Stop reason: HOSPADM

## 2017-03-16 RX ORDER — SODIUM CHLORIDE 9 MG/ML
INJECTION, SOLUTION INTRAVENOUS
Status: DISCONTINUED | OUTPATIENT
Start: 2017-03-16 | End: 2017-03-16 | Stop reason: HOSPADM

## 2017-03-16 RX ORDER — DEXTROSE, SODIUM CHLORIDE, AND POTASSIUM CHLORIDE 5; .45; .15 G/100ML; G/100ML; G/100ML
25 INJECTION INTRAVENOUS CONTINUOUS
Status: DISCONTINUED | OUTPATIENT
Start: 2017-03-16 | End: 2017-03-17 | Stop reason: HOSPADM

## 2017-03-16 RX ORDER — DIPHENHYDRAMINE HCL 25 MG
25 CAPSULE ORAL
Status: DISCONTINUED | OUTPATIENT
Start: 2017-03-16 | End: 2017-03-17 | Stop reason: HOSPADM

## 2017-03-16 RX ORDER — GLYCOPYRROLATE 0.2 MG/ML
INJECTION INTRAMUSCULAR; INTRAVENOUS AS NEEDED
Status: DISCONTINUED | OUTPATIENT
Start: 2017-03-16 | End: 2017-03-16 | Stop reason: HOSPADM

## 2017-03-16 RX ORDER — HYDROMORPHONE HYDROCHLORIDE 2 MG/ML
0.5 INJECTION, SOLUTION INTRAMUSCULAR; INTRAVENOUS; SUBCUTANEOUS
Status: DISCONTINUED | OUTPATIENT
Start: 2017-03-16 | End: 2017-03-16 | Stop reason: SDUPTHER

## 2017-03-16 RX ORDER — CELECOXIB 400 MG/1
400 CAPSULE ORAL
Status: COMPLETED | OUTPATIENT
Start: 2017-03-16 | End: 2017-03-16

## 2017-03-16 RX ORDER — MIDAZOLAM HYDROCHLORIDE 1 MG/ML
INJECTION, SOLUTION INTRAMUSCULAR; INTRAVENOUS AS NEEDED
Status: DISCONTINUED | OUTPATIENT
Start: 2017-03-16 | End: 2017-03-16 | Stop reason: HOSPADM

## 2017-03-16 RX ORDER — DEXAMETHASONE SODIUM PHOSPHATE 4 MG/ML
INJECTION, SOLUTION INTRA-ARTICULAR; INTRALESIONAL; INTRAMUSCULAR; INTRAVENOUS; SOFT TISSUE AS NEEDED
Status: DISCONTINUED | OUTPATIENT
Start: 2017-03-16 | End: 2017-03-16 | Stop reason: HOSPADM

## 2017-03-16 RX ORDER — ROCURONIUM BROMIDE 10 MG/ML
INJECTION, SOLUTION INTRAVENOUS AS NEEDED
Status: DISCONTINUED | OUTPATIENT
Start: 2017-03-16 | End: 2017-03-16 | Stop reason: HOSPADM

## 2017-03-16 RX ORDER — DIPHENHYDRAMINE HYDROCHLORIDE 50 MG/ML
12.5 INJECTION, SOLUTION INTRAMUSCULAR; INTRAVENOUS
Status: DISCONTINUED | OUTPATIENT
Start: 2017-03-16 | End: 2017-03-16 | Stop reason: HOSPADM

## 2017-03-16 RX ORDER — HYDROMORPHONE HYDROCHLORIDE 2 MG/ML
INJECTION, SOLUTION INTRAMUSCULAR; INTRAVENOUS; SUBCUTANEOUS AS NEEDED
Status: DISCONTINUED | OUTPATIENT
Start: 2017-03-16 | End: 2017-03-16 | Stop reason: HOSPADM

## 2017-03-16 RX ORDER — FAMOTIDINE 10 MG/ML
20 INJECTION INTRAVENOUS EVERY 12 HOURS
Status: DISCONTINUED | OUTPATIENT
Start: 2017-03-16 | End: 2017-03-17 | Stop reason: HOSPADM

## 2017-03-16 RX ORDER — NALOXONE HYDROCHLORIDE 0.4 MG/ML
0.4 INJECTION, SOLUTION INTRAMUSCULAR; INTRAVENOUS; SUBCUTANEOUS AS NEEDED
Status: DISCONTINUED | OUTPATIENT
Start: 2017-03-16 | End: 2017-03-17 | Stop reason: HOSPADM

## 2017-03-16 RX ORDER — SUCCINYLCHOLINE CHLORIDE 20 MG/ML
INJECTION INTRAMUSCULAR; INTRAVENOUS AS NEEDED
Status: DISCONTINUED | OUTPATIENT
Start: 2017-03-16 | End: 2017-03-16 | Stop reason: HOSPADM

## 2017-03-16 RX ORDER — ONDANSETRON 2 MG/ML
4 INJECTION INTRAMUSCULAR; INTRAVENOUS ONCE
Status: DISCONTINUED | OUTPATIENT
Start: 2017-03-16 | End: 2017-03-16 | Stop reason: HOSPADM

## 2017-03-16 RX ORDER — LORAZEPAM 2 MG/ML
1 INJECTION INTRAMUSCULAR
Status: DISCONTINUED | OUTPATIENT
Start: 2017-03-16 | End: 2017-03-17 | Stop reason: HOSPADM

## 2017-03-16 RX ORDER — SODIUM CHLORIDE, SODIUM LACTATE, POTASSIUM CHLORIDE, CALCIUM CHLORIDE 600; 310; 30; 20 MG/100ML; MG/100ML; MG/100ML; MG/100ML
75 INJECTION, SOLUTION INTRAVENOUS CONTINUOUS
Status: DISCONTINUED | OUTPATIENT
Start: 2017-03-16 | End: 2017-03-16 | Stop reason: HOSPADM

## 2017-03-16 RX ORDER — ONDANSETRON 2 MG/ML
4 INJECTION INTRAMUSCULAR; INTRAVENOUS
Status: DISCONTINUED | OUTPATIENT
Start: 2017-03-16 | End: 2017-03-17 | Stop reason: HOSPADM

## 2017-03-16 RX ORDER — DIAZEPAM 5 MG/1
5 TABLET ORAL
Status: DISCONTINUED | OUTPATIENT
Start: 2017-03-16 | End: 2017-03-17 | Stop reason: HOSPADM

## 2017-03-16 RX ADMIN — THROMBIN, TOPICAL (RECOMBINANT) 5000 UNITS: KIT at 08:59

## 2017-03-16 RX ADMIN — SODIUM CHLORIDE 1000 MG: 900 INJECTION, SOLUTION INTRAVENOUS at 07:54

## 2017-03-16 RX ADMIN — GELATIN ABSORBABLE SPONGE SIZE 100 1 EACH: MISC at 08:58

## 2017-03-16 RX ADMIN — ROCURONIUM BROMIDE 25 MG: 10 INJECTION, SOLUTION INTRAVENOUS at 08:36

## 2017-03-16 RX ADMIN — HYDROMORPHONE HYDROCHLORIDE 0.5 MG: 2 INJECTION, SOLUTION INTRAMUSCULAR; INTRAVENOUS; SUBCUTANEOUS at 10:23

## 2017-03-16 RX ADMIN — PROPOFOL 200 MG: 10 INJECTION, EMULSION INTRAVENOUS at 08:28

## 2017-03-16 RX ADMIN — FENTANYL CITRATE 100 MCG: 50 INJECTION, SOLUTION INTRAMUSCULAR; INTRAVENOUS at 08:56

## 2017-03-16 RX ADMIN — GABAPENTIN 300 MG: 300 CAPSULE ORAL at 17:19

## 2017-03-16 RX ADMIN — Medication 10 ML: at 22:59

## 2017-03-16 RX ADMIN — SUCCINYLCHOLINE CHLORIDE 140 MG: 20 INJECTION INTRAMUSCULAR; INTRAVENOUS at 08:28

## 2017-03-16 RX ADMIN — HYDROMORPHONE HYDROCHLORIDE 1 MG: 1 INJECTION, SOLUTION INTRAMUSCULAR; INTRAVENOUS; SUBCUTANEOUS at 18:48

## 2017-03-16 RX ADMIN — VANCOMYCIN HYDROCHLORIDE 1 G: 1 INJECTION, POWDER, LYOPHILIZED, FOR SOLUTION INTRAVENOUS at 08:59

## 2017-03-16 RX ADMIN — MIDAZOLAM HYDROCHLORIDE 2 MG: 1 INJECTION, SOLUTION INTRAMUSCULAR; INTRAVENOUS at 08:22

## 2017-03-16 RX ADMIN — DEXAMETHASONE SODIUM PHOSPHATE 10 MG: 4 INJECTION, SOLUTION INTRA-ARTICULAR; INTRALESIONAL; INTRAMUSCULAR; INTRAVENOUS; SOFT TISSUE at 23:55

## 2017-03-16 RX ADMIN — GLYCOPYRROLATE 0.6 MG: 0.2 INJECTION INTRAMUSCULAR; INTRAVENOUS at 10:23

## 2017-03-16 RX ADMIN — Medication 10 ML: at 17:20

## 2017-03-16 RX ADMIN — PREGABALIN 50 MG: 50 CAPSULE ORAL at 07:39

## 2017-03-16 RX ADMIN — ONDANSETRON 4 MG: 2 INJECTION INTRAMUSCULAR; INTRAVENOUS at 08:22

## 2017-03-16 RX ADMIN — ONDANSETRON 4 MG: 2 INJECTION INTRAMUSCULAR; INTRAVENOUS at 13:59

## 2017-03-16 RX ADMIN — HYDROMORPHONE HYDROCHLORIDE 1 MG: 2 INJECTION, SOLUTION INTRAMUSCULAR; INTRAVENOUS; SUBCUTANEOUS at 10:34

## 2017-03-16 RX ADMIN — FENTANYL CITRATE 100 MCG: 50 INJECTION, SOLUTION INTRAMUSCULAR; INTRAVENOUS at 08:28

## 2017-03-16 RX ADMIN — FAMOTIDINE 20 MG: 10 INJECTION, SOLUTION INTRAVENOUS at 20:49

## 2017-03-16 RX ADMIN — ONDANSETRON 4 MG: 2 INJECTION INTRAMUSCULAR; INTRAVENOUS at 18:48

## 2017-03-16 RX ADMIN — ROCURONIUM BROMIDE 5 MG: 10 INJECTION, SOLUTION INTRAVENOUS at 08:28

## 2017-03-16 RX ADMIN — NEOSTIGMINE METHYLSULFATE 4 MG: 1 INJECTION INTRAVENOUS at 10:23

## 2017-03-16 RX ADMIN — HYDROMORPHONE HYDROCHLORIDE 0.5 MG: 2 INJECTION, SOLUTION INTRAMUSCULAR; INTRAVENOUS; SUBCUTANEOUS at 11:25

## 2017-03-16 RX ADMIN — GABAPENTIN 300 MG: 300 CAPSULE ORAL at 14:01

## 2017-03-16 RX ADMIN — GLYCOPYRROLATE 0.2 MG: 0.2 INJECTION INTRAMUSCULAR; INTRAVENOUS at 08:22

## 2017-03-16 RX ADMIN — DEXAMETHASONE SODIUM PHOSPHATE 10 MG: 4 INJECTION, SOLUTION INTRA-ARTICULAR; INTRALESIONAL; INTRAMUSCULAR; INTRAVENOUS; SOFT TISSUE at 08:28

## 2017-03-16 RX ADMIN — FENTANYL CITRATE 50 MCG: 50 INJECTION, SOLUTION INTRAMUSCULAR; INTRAVENOUS at 10:23

## 2017-03-16 RX ADMIN — PROMETHAZINE HYDROCHLORIDE 12.5 MG: 25 INJECTION INTRAMUSCULAR; INTRAVENOUS at 22:48

## 2017-03-16 RX ADMIN — SODIUM CHLORIDE: 9 INJECTION, SOLUTION INTRAVENOUS at 08:34

## 2017-03-16 RX ADMIN — SODIUM CHLORIDE, SODIUM LACTATE, POTASSIUM CHLORIDE, AND CALCIUM CHLORIDE 75 ML/HR: 600; 310; 30; 20 INJECTION, SOLUTION INTRAVENOUS at 07:54

## 2017-03-16 RX ADMIN — KETOROLAC TROMETHAMINE 30 MG: 30 INJECTION, SOLUTION INTRAMUSCULAR; INTRAVENOUS at 10:03

## 2017-03-16 RX ADMIN — FENTANYL CITRATE 50 MCG: 50 INJECTION, SOLUTION INTRAMUSCULAR; INTRAVENOUS at 09:30

## 2017-03-16 RX ADMIN — DEXAMETHASONE SODIUM PHOSPHATE 10 MG: 4 INJECTION, SOLUTION INTRA-ARTICULAR; INTRALESIONAL; INTRAMUSCULAR; INTRAVENOUS; SOFT TISSUE at 17:19

## 2017-03-16 RX ADMIN — FAMOTIDINE 20 MG: 10 INJECTION, SOLUTION INTRAVENOUS at 13:59

## 2017-03-16 RX ADMIN — CELECOXIB 400 MG: 400 CAPSULE ORAL at 07:39

## 2017-03-16 RX ADMIN — LIDOCAINE HYDROCHLORIDE 50 MG: 20 INJECTION, SOLUTION EPIDURAL; INFILTRATION; INTRACAUDAL; PERINEURAL at 08:28

## 2017-03-16 RX ADMIN — DEXTROSE MONOHYDRATE, SODIUM CHLORIDE, AND POTASSIUM CHLORIDE 25 ML/HR: 50; 4.5; 1.49 INJECTION, SOLUTION INTRAVENOUS at 13:56

## 2017-03-16 RX ADMIN — LIDOCAINE HYDROCHLORIDE 50 MG: 20 INJECTION, SOLUTION EPIDURAL; INFILTRATION; INTRACAUDAL; PERINEURAL at 10:03

## 2017-03-16 RX ADMIN — HYDROMORPHONE HYDROCHLORIDE 0.5 MG: 2 INJECTION, SOLUTION INTRAMUSCULAR; INTRAVENOUS; SUBCUTANEOUS at 11:35

## 2017-03-16 RX ADMIN — FAMOTIDINE 20 MG: 20 TABLET, FILM COATED ORAL at 07:39

## 2017-03-16 RX ADMIN — SODIUM CHLORIDE 1000 MG: 900 INJECTION, SOLUTION INTRAVENOUS at 19:05

## 2017-03-16 RX ADMIN — HYDROMORPHONE HYDROCHLORIDE 1 MG: 1 INJECTION, SOLUTION INTRAMUSCULAR; INTRAVENOUS; SUBCUTANEOUS at 14:02

## 2017-03-16 RX ADMIN — SODIUM CHLORIDE 1 G: 900 INJECTION, SOLUTION INTRAVENOUS at 08:22

## 2017-03-16 RX ADMIN — SODIUM CHLORIDE, SODIUM LACTATE, POTASSIUM CHLORIDE, AND CALCIUM CHLORIDE: 600; 310; 30; 20 INJECTION, SOLUTION INTRAVENOUS at 08:22

## 2017-03-16 NOTE — ANESTHESIA PREPROCEDURE EVALUATION
Anesthetic History               Review of Systems / Medical History  Patient summary reviewed, nursing notes reviewed and pertinent labs reviewed    Pulmonary  Within defined limits                 Neuro/Psych   Within defined limits           Cardiovascular  Within defined limits                     GI/Hepatic/Renal  Within defined limits              Endo/Other        Arthritis     Other Findings   Comments: Current Smoker? NO       Elective Surgery? Yes       Abstained from smoking 24 hours prior to anesthesia? N/A    Risk Factors for Postoperative nausea/vomiting:       History of postoperative nausea/vomiting? NO       Female? YES       Motion sickness? NO       Intended opioid administration for postoperative analgesia?   YES           Physical Exam    Airway  Mallampati: II  TM Distance: 4 - 6 cm  Neck ROM: normal range of motion   Mouth opening: Normal     Cardiovascular  Regular rate and rhythm,  S1 and S2 normal,  no murmur, click, rub, or gallop             Dental  No notable dental hx       Pulmonary  Breath sounds clear to auscultation               Abdominal  GI exam deferred       Other Findings            Anesthetic Plan    ASA: 2  Anesthesia type: general          Induction: Intravenous  Anesthetic plan and risks discussed with: Patient

## 2017-03-16 NOTE — PROGRESS NOTES
Mobility Intervention:       [] Pt dangled at edge of bed    [] Pt assisted OOB to bedside commode    [] Pt assisted OOB to chair    [] Pt ambulated to bathroom    [] Patient was ambulated in room/hallway    Assistive Device Utilized (check all that apply):       [x] Rolling walker   [] Crutches   [] Straight Cane   [] Knee immobilizer   [] IV pole    After Mobilization:     [] Patient left in no apparent distress sitting up in chair  [x] Patient left in no apparent distress in bed  [x] Call bell left within reach  Assistive Device:        [] RN notified  [] Caregiver present  [] Bed alarm activated    Comments:

## 2017-03-16 NOTE — PERIOP NOTES
TRANSFER - OUT REPORT:    Verbal report given to Marilu RN on Bruna Pham  being transferred to  for routine post - op       Report consisted of patients Situation, Background, Assessment and   Recommendations(SBAR). Information from the following report(s) SBAR and MAR was reviewed with the receiving nurse. Lines:   Peripheral IV 03/16/17 Right Hand (Active)   Site Assessment Clean, dry, & intact 3/16/2017 10:31 AM   Phlebitis Assessment 0 3/16/2017 10:31 AM   Infiltration Assessment 0 3/16/2017 10:31 AM   Dressing Status Clean, dry, & intact 3/16/2017 10:31 AM   Dressing Type Tape;Transparent 3/16/2017 10:31 AM   Hub Color/Line Status Pink;Capped 3/16/2017 10:31 AM   Alcohol Cap Used No 3/16/2017  9:45 AM       Peripheral IV 03/16/17 Left Hand (Active)   Site Assessment Clean, dry, & intact 3/16/2017 10:31 AM   Phlebitis Assessment 0 3/16/2017 10:31 AM   Infiltration Assessment 0 3/16/2017 10:31 AM   Dressing Status Clean, dry, & intact 3/16/2017 10:31 AM   Dressing Type Tape;Transparent 3/16/2017 10:31 AM   Hub Color/Line Status Pink; Infusing 3/16/2017 10:31 AM        Opportunity for questions and clarification was provided.       Patient transported with:   [employer]

## 2017-03-16 NOTE — PROGRESS NOTES
Problem: Mobility Impaired (Adult and Pediatric)  Goal: *Acute Goals and Plan of Care (Insert Text)  Physical Therapy Goals  Initiated 3/16/2017 and to be accomplished within 7 day(s)  1. Patient will move from supine to sit and sit to supine in bed with modified independence. 2. Patient will transfer from bed to chair and chair to bed with modified independence using the least restrictive device. 3. Patient will perform sit to stand with modified independence. 4. Patient will ambulate with modified independence for 300 feet with the least restrictive device. 5. Patient will ascend/descend 4 stairs with 1 handrail(s) with supervision/set-up. PHYSICAL THERAPY EVALUATION     Patient: Trevor Fry (39 y.o. female)  Date: 3/16/2017  Primary Diagnosis: HNP (herniated nucleus pulposus) with myelopathy, cervical [M50.00]  Cervical arthritis with myelopathy [M47.12]  Procedure(s) (LRB):  C3-4 ARTHROPLASTY (N/A) Day of Surgery   Precautions: fall, cervical         ASSESSMENT :  Based on the objective data described below, the patient presents with decreased strength, balance and activity tolerance resulting in decreased independence with functional mobility. Pt complained of nausea at initiation of eval but was willing to attempt mobility. Pt required min A for supine to sit transfer. As soon as she sat up patient began vomiting. Patient was returned to supine and her nurse was in the room to assist her. Patient will benefit from skilled intervention to address the above impairments.   Patients rehabilitation potential is considered to be Good  Factors which may influence rehabilitation potential include:   [X]         None noted  [ ]         Mental ability/status  [ ]         Medical condition  [ ]         Home/family situation and support systems  [ ]         Safety awareness  [ ]         Pain tolerance/management  [ ]         Other:        PLAN :  Recommendations and Planned Interventions:  [X] Bed Mobility Training             [X]    Neuromuscular Re-Education  [X]           Transfer Training                   [ ]    Orthotic/Prosthetic Training  [X]           Gait Training                          [ ]    Modalities  [X]           Therapeutic Exercises          [ ]    Edema Management/Control  [X]           Therapeutic Activities            [X]    Patient and Family Training/Education  [ ]           Other (comment):     Frequency/Duration: Patient will be followed by physical therapy 1-2 times per day/4-7 days per week to address goals. Discharge Recommendations: Home Health  Further Equipment Recommendations for Discharge: N/A       G-CODES       Mobility  Current  CJ= 20-39%   Goal  CI= 1-19%. The severity rating is based on the Level of Assistance required for Functional Mobility and ADLs. Eval Complexity: History: MEDIUM  Complexity : 1-2 comorbidities / personal factors will impact the outcome/ POC Exam:MEDIUM Complexity : 3 Standardized tests and measures addressing body structure, function, activity limitation and / or participation in recreation  Presentation: MEDIUM Complexity : Evolving with changing characteristics  Clinical Decision Making:Medium Complexity , Overall Complexity:MEDIUM      SUBJECTIVE:   Patient stated I don't feel good.       OBJECTIVE DATA SUMMARY:       Past Medical History:   Diagnosis Date    Arthritis       spinal stenosis     Past Surgical History:   Procedure Laterality Date    HX  SECTION         x 3    HX CHOLECYSTECTOMY        HX HERNIA REPAIR         x2     Prior Level of Function/Home Situation: independent with mobility without assistive device  Home Situation  Home Environment: Private residence  # Steps to Enter: 5  One/Two Story Residence: One story  Living Alone: No  Support Systems: Family member(s)  Patient Expects to be Discharged to[de-identified] Private residence  Current DME Used/Available at Home: None  Critical Behavior:   calm and cooperative   Strength:    Strength: Generally decreased, functional (B LEs)   Tone & Sensation:   Tone: Normal (B LEs)     Range Of Motion:  AROM: Within functional limits (slightly limited R knee)      Functional Mobility:  Bed Mobility:  Supine to Sit: Minimum assistance  Sit to Supine: Minimum assistance     Balance:   Sitting: Intact  Therapeutic Exercises: Ankle pumps, heel slides  Pain:  Pt reports 4/10 pain or discomfort prior to treatment. Pt reports 5/10 pain or discomfort post treatment. Activity Tolerance:   Fair due to nausea   Please refer to the flowsheet for vital signs taken during this treatment. After treatment:   [ ]         Patient left in no apparent distress sitting up in chair  [X]         Patient left in no apparent distress in bed  [X]         Call bell left within reach  [X]         Nursing present  [X]         Caregiver present  [ ]         Bed alarm activated      COMMUNICATION/EDUCATION:   [X]         Fall prevention education was provided and the patient/caregiver indicated understanding. [X]         Patient/family have participated as able in goal setting and plan of care. [X]         Patient/family agree to work toward stated goals and plan of care. [ ]         Patient understands intent and goals of therapy, but is neutral about his/her participation. [ ]         Patient is unable to participate in goal setting and plan of care.   Educated patient on cervical precautions and calling for assistance to get up     Thank you for this referral.  Marcel Zapata, PT   Time Calculation: 10 mins

## 2017-03-16 NOTE — PROGRESS NOTES
conducted a pre-surgery visit with [facility], who is a 40 y.o.,female. The  provided the following Interventions:  Initiated a relationship of care and support. Offered prayer and assurance of continued prayers on patient's behalf. Plan:  Chaplains will continue to follow and will provide pastoral care on an as needed/requested basis.  recommends bedside caregivers page  on duty if patient shows signs of acute spiritual or emotional distress.     [address] [facility] Certified [address] [place] [address]   [phone]

## 2017-03-16 NOTE — INTERVAL H&P NOTE
H&P Update:  Timothy Tapia was seen and examined. History and physical has been reviewed. The patient has been examined.  There have been no significant clinical changes since the completion of the originally dated History and Physical.    Signed By: Adam Dickson MD     March 16, 2017 8:11 AM

## 2017-03-16 NOTE — IP AVS SNAPSHOT
Current Discharge Medication List  
  
CONTINUE these medications which have CHANGED Dose & Instructions Dispensing Information Comments Morning Noon Evening Bedtime * traMADol 50 mg tablet Commonly known as:  ULTRAM  
What changed:  Another medication with the same name was added. Make sure you understand how and when to take each. Your last dose was: Your next dose is:    
   
   
 TK 1 T PO  Q 6 H PRN Refills:  0  
     
   
   
   
  
 * traMADol 50 mg tablet Commonly known as:  ULTRAM  
What changed: You were already taking a medication with the same name, and this prescription was added. Make sure you understand how and when to take each. Your last dose was: Your next dose is:    
   
   
 Dose:  50 mg Take 1 Tab by mouth every six (6) hours as needed. Max Daily Amount: 200 mg. Indications: Pain Quantity:  40 Tab Refills:  0  
     
   
   
   
  
 * Notice: This list has 2 medication(s) that are the same as other medications prescribed for you. Read the directions carefully, and ask your doctor or other care provider to review them with you. CONTINUE these medications which have NOT CHANGED Dose & Instructions Dispensing Information Comments Morning Noon Evening Bedtime  
 diazePAM 5 mg tablet Commonly known as:  VALIUM Your last dose was: Your next dose is:    
   
   
 TK ONE T PO Q 6 HOURS PRN Refills:  1  
     
   
   
   
  
 gabapentin 300 mg capsule Commonly known as:  NEURONTIN Your last dose was: Your next dose is:    
   
   
 Dose:  300 mg Take 1 Cap by mouth three (3) times daily (with meals). Indications: NEUROPATHIC PAIN Quantity:  90 Cap Refills:  1  
     
   
   
   
  
 naproxen 500 mg tablet Commonly known as:  NAPROSYN Your last dose was: Your next dose is:    
   
   
 TK 1 T PO BID PRN P Refills:  2 * triamcinolone 55 mcg nasal inhaler Commonly known as:  NASACORT AQ Your last dose was: Your next dose is:    
   
   
 Dose:  1 Spray 1 Spray as needed. Refills:  0  
     
   
   
   
  
 * triamcinolone acetonide 0.1 % topical cream  
Commonly known as:  KENALOG Your last dose was: Your next dose is: AARON AA BID Refills:  1  
     
   
   
   
  
 * Notice: This list has 2 medication(s) that are the same as other medications prescribed for you. Read the directions carefully, and ask your doctor or other care provider to review them with you. ASK your doctor about these medications Dose & Instructions Dispensing Information Comments Morning Noon Evening Bedtime * tiZANidine 6 mg capsule Commonly known as:  Clearance Breonna What changed:  Another medication with the same name was added. Make sure you understand how and when to take each. Ask about: Which instructions should I use? Your last dose was: Your next dose is:    
   
   
 TK 1 C PO TID PRN Refills:  1  
     
   
   
   
  
 * tiZANidine 6 mg capsule Commonly known as:  Clearance Breonna What changed: You were already taking a medication with the same name, and this prescription was added. Make sure you understand how and when to take each. Ask about: Which instructions should I use? Your last dose was: Your next dose is:    
   
   
 Dose:  6 mg Take 1 Cap by mouth three (3) times daily as needed. Indications: MUSCLE SPASM Quantity:  90 Cap Refills:  0  
     
   
   
   
  
 * Notice: This list has 2 medication(s) that are the same as other medications prescribed for you. Read the directions carefully, and ask your doctor or other care provider to review them with you. Where to Get Your Medications These medications were sent to [address] [place] [address] 1 [facility] [address] [facility] [place] [address] Hours:  24-hours Phone:  [phone] tiZANidine 6 mg capsule Information on where to get these meds will be given to you by the nurse or doctor. ! Ask your nurse or doctor about these medications  
  traMADol 50 mg tablet

## 2017-03-16 NOTE — PROGRESS NOTES
Received patient from PACU at , patient is alert and oriented x 4, patient appear drowsy, but easily arouse to stimulus, incision dressing to the neck clean dry and  Intact, patient denies any acute distress, SOB and chest pain. Patient complained of nausea will give Zofran, orientate patient to the room, instruct patient to use the ICS. No apparent distress noted, no neurological deficit noted, placed call bell within reach, will continue to monitor.

## 2017-03-16 NOTE — ANESTHESIA POSTPROCEDURE EVALUATION
Post-Anesthesia Evaluation and Assessment    Patient: Adan Suh MRN: [id number]  SSN: [id number]    YOB: 1970  Age: 40 y.o. Sex: female       Cardiovascular Function/Vital Signs  Visit Vitals    /68    Pulse 75    Temp 36.6 °C (97.9 °F)    Resp 19    Ht 5' 1\" (1.549 m)    Wt 105.4 kg (232 lb 6 oz)    SpO2 94%    BMI 43.91 kg/m2       Patient is status post general anesthesia for Procedure(s):  C3-4 ARTHROPLASTY. Nausea/Vomiting: None    Postoperative hydration reviewed and adequate. Pain:  Pain Scale 1: Visual (03/16/17 1145)  Pain Intensity 1: 0 (03/16/17 1145)   Managed    Neurological Status:   Neuro (WDL): Within Defined Limits (03/16/17 1031)   At baseline    Mental Status and Level of Consciousness: Arousable    Pulmonary Status:   O2 Device: Room air (03/16/17 1041)   Adequate oxygenation and airway patent    Complications related to anesthesia: None    Post-anesthesia assessment completed.  No concerns    Signed By: tSevan Whiteside MD     March 16, 2017

## 2017-03-16 NOTE — PROGRESS NOTES
3/16/17 Patient's  dropped off a FMLA ([address] [employer]) form to be completed at [facility] . I explained to Mr Antonio Balderas that it would take 7-10 business days to be completed, he understood. The form has been put in the file to be completed. They would like to  form when completed. Call Mrs Antonio Balderas when done at [phone].

## 2017-03-16 NOTE — OP NOTES
[address]    Name:  Piero Aceves  MR#:  [id number]  :  1970  Account #:  [de-identified]  Date of Adm:  2017  Date of Surgery:  2017      SURGEON: Britney Wright MD    PREOPERATIVE DIAGNOSIS: Herniated nucleus pulposus C3-4 with  myelopathy. POSTOPERATIVE DIAGNOSIS: Herniated nucleus pulposus C3-4  with myelopathy. PROCEDURES PERFORMED: Anterior cervical diskectomy C3-4 with  cervical disk arthroplasty, Mobi-C type. ANESTHESIA:      FINDINGS: The patient had a large central disk herniation at C3-4 with  cord compression. DESCRIPTION OF PROCEDURE: Following induction of endotracheal  anesthesia, the patient placed with head in neutral position on the  radiolucent spinal frame. The patient was prepped and draped in the  usual fashion. Right-sided approach was utilized. Sternocleidomastoid  and great vessels mobilized laterally. Esophagus and trachea  mobilized medially with blunt finger dissection. Prevertebral fascia was  entered and C-arm image verified our surgical level. Daggett pins  placed in the midline in vertebral bodies of 3 and 4 parallel to the disk  space. Cloward self-retaining retractor placed to cover longus coli  musculature bilaterally. Annular tissues incised and a radical  diskectomy done back to the posterior margin. Disk material debrided  until we found the remnant of the posterior longitudinal ligament. Disk  material removed with micro instruments until I had defined the  posterior longitudinal ligament, debrided the remnants of the posterior  longitudinal ligament defining the dura, removed all ligamentous and  disk material to the dura was defined. Further extruded sequestered  nuclear material removed from underneath the edge to the vertebral  body inferiorly. Thorough decompression done of the epidural space.  I  could palpate superiorly and inferiorly with micro nerve hook and found  no further loose disk material. The dura appeared to be flat and  decompressed across the disk space medially, laterally, superiorly and  inferiorly. The dura was decompressed under direct visualization. Endplates were left intact. They were prepared and all loose  cartilaginous material removed down to the bony surface. Sizers were  utilized and ultimately a 15 x 13 x 5 height device was selected and  trialed with good fit and fill. With care taken to ensure excellent  anatomic rotation and alignment, the device was inserted under  fluoroscopic guidance. It was well seated. It had excellent motion  and flexion. Extension appeared to be directly in the midline with pin on  pin centering on fluoroscopic imaging. The wound was copiously  irrigated. The pins were removed. Bone wax was plated in the pinhole  sites. The wound was irrigated. There was absolutely no bleeding. Vancomycin powder instilled for infection prophylaxis. No bleeding of  any kind was seen. The neck was closed in layers and the skin closed  with a subcuticular suture and Dermabond. Sterile dressing placed  upon the wound. COUNTS: All counts were correct. ESTIMATED BLOOD LOSS: Less than 5 mL. COMPLICATIONS: There were no complications. SPECIMENS REMOVED: There were no specimens. CONDITION: The patient went to the recovery room in good and stable  condition.         Marsha Felty, MD    [address]  D:  03/16/2017   10:11  T:  03/16/2017   10:42  Job #:  [id number]

## 2017-03-16 NOTE — BRIEF OP NOTE
BRIEF OPERATIVE NOTE    Date of Procedure: 3/16/2017   Preoperative Diagnosis: HNP (herniated nucleus pulposus) with myelopathy, cervical [M50.00]  Cervical arthritis with myelopathy [M47.12]  Postoperative Diagnosis: HNP (herniated nucleus pulposus) with myelopathy, cervical [M50.00]  Cervical arthritis with myelopathy [M47.12]    Procedure(s):  C3-4 ARTHROPLASTY  Surgeon(s) and Role:     * Kam Sheehan MD - Primary            Surgical Staff:  Circ-1: Greg Wallace RN  Radiology Technician: [address] [place] [address]  Scrub Tech-1: Gabriela [facility]  Surg Asst-1: Adam Rodriguez  Event Time In   Incision Start 4186   Incision Close      Anesthesia: General   Estimated Blood Loss: 5  Specimens: * No specimens in log *   Findings: large hnp   Complications: 0  Implants:   Implant Name Type Inv.  Item Serial No.  Lot No. LRB No. Used Action   DISC CERV MOBI-C 13X15 H5 US --  - [id number]   DISC CERV MOBI-C 13X15 H5 US --    [employer] [id number] N/A 1 Implanted

## 2017-03-16 NOTE — IP AVS SNAPSHOT
[address] Patient: Brent Butler MRN: [id number] :1970 You are allergic to the following Allergen Reactions Iodine Hives Oxycodone Other (comments) Penicillin G Other (comments) Recent Documentation Height Weight Breastfeeding? BMI OB Status Smoking Status 1.549 m 105.4 kg No 43.91 kg/m2 Having regular periods Never Smoker Emergency Contacts Name Discharge Info Relation Home Work Mobile Nuno Khanna DISCHARGE CAREGIVER [3] Spouse [3] [phone] About your hospitalization You were admitted on:  2017 You last received care in the:  [facility] You were discharged on:  2017 Unit phone number:  [phone] Why you were hospitalized Your primary diagnosis was:  Not on File Providers Seen During Your Hospitalizations Provider Role Specialty Primary office phone Nell Wilson MD Attending Provider Orthopedic Surgery [phone] Your Primary Care Physician (PCP) Primary Care Physician Office Phone Office Fax [facility] [phone] Follow-up Information Follow up With Details Comments Contact Info Vanessa Cm MD On 3/22/2017 Appointment at 3:45pm [address] [place] [address] 
[phone] Nell Wilson MD On 3/30/2017 Appointment at 10:00am with Balbir Velasco NP [address] [facility] [address] [phone] Your Appointments  10:00 AM EDT  
POST OP with Elvie Chan NP  
[facility] [address] [facility] [address]  
[phone] 2017  8:00 AM EDT  
POST OP with Nell Wilson MD  
[address] [place] [address], [address] [facility] [address] [facility] [address]  
[phone] Current Discharge Medication List  
  
CONTINUE these medications which have CHANGED Dose & Instructions Dispensing Information Comments Morning Noon Evening Bedtime * traMADol 50 mg tablet Commonly known as:  ULTRAM  
What changed:  Another medication with the same name was added. Make sure you understand how and when to take each. Your last dose was: Your next dose is:    
   
   
 TK 1 T PO  Q 6 H PRN Refills:  0  
     
   
   
   
  
 * traMADol 50 mg tablet Commonly known as:  ULTRAM  
What changed: You were already taking a medication with the same name, and this prescription was added. Make sure you understand how and when to take each. Your last dose was: Your next dose is:    
   
   
 Dose:  50 mg Take 1 Tab by mouth every six (6) hours as needed. Max Daily Amount: 200 mg. Indications: Pain Quantity:  40 Tab Refills:  0  
     
   
   
   
  
 * Notice: This list has 2 medication(s) that are the same as other medications prescribed for you. Read the directions carefully, and ask your doctor or other care provider to review them with you. CONTINUE these medications which have NOT CHANGED Dose & Instructions Dispensing Information Comments Morning Noon Evening Bedtime  
 diazePAM 5 mg tablet Commonly known as:  VALIUM Your last dose was: Your next dose is:    
   
   
 TK ONE T PO Q 6 HOURS PRN Refills:  1  
     
   
   
   
  
 gabapentin 300 mg capsule Commonly known as:  NEURONTIN Your last dose was: Your next dose is:    
   
   
 Dose:  300 mg Take 1 Cap by mouth three (3) times daily (with meals). Indications: NEUROPATHIC PAIN Quantity:  90 Cap Refills:  1  
     
   
   
   
  
 naproxen 500 mg tablet Commonly known as:  NAPROSYN Your last dose was: Your next dose is: TK 1 T PO BID PRN P Refills:  2  
     
   
   
   
  
 * triamcinolone 55 mcg nasal inhaler Commonly known as:  NASACORT AQ Your last dose was: Your next dose is:    
   
   
 Dose:  1 Spray 1 Spray as needed. Refills:  0  
     
   
   
   
  
 * triamcinolone acetonide 0.1 % topical cream  
Commonly known as:  KENALOG Your last dose was: Your next dose is: AARON AA BID Refills:  1  
     
   
   
   
  
 * Notice: This list has 2 medication(s) that are the same as other medications prescribed for you. Read the directions carefully, and ask your doctor or other care provider to review them with you. ASK your doctor about these medications Dose & Instructions Dispensing Information Comments Morning Noon Evening Bedtime * tiZANidine 6 mg capsule Commonly known as:  Linda Gtz What changed:  Another medication with the same name was added. Make sure you understand how and when to take each. Ask about: Which instructions should I use? Your last dose was: Your next dose is:    
   
   
 TK 1 C PO TID PRN Refills:  1  
     
   
   
   
  
 * tiZANidine 6 mg capsule Commonly known as:  Linda Gtz What changed: You were already taking a medication with the same name, and this prescription was added. Make sure you understand how and when to take each. Ask about: Which instructions should I use? Your last dose was: Your next dose is:    
   
   
 Dose:  6 mg Take 1 Cap by mouth three (3) times daily as needed. Indications: MUSCLE SPASM Quantity:  90 Cap Refills:  0  
     
   
   
   
  
 * Notice: This list has 2 medication(s) that are the same as other medications prescribed for you. Read the directions carefully, and ask your doctor or other care provider to review them with you. Where to Get Your Medications These medications were sent to [address] [place] [address] 1 [facility] 77 [address] [facility], [facility] [address] Hours:  24-hours Phone:  [phone] tiZANidine 6 mg capsule Information on where to get these meds will be given to you by the nurse or doctor. ! Ask your nurse or doctor about these medications  
  traMADol 50 mg tablet Discharge Instructions DISCHARGE SUMMARY from Nurse The following personal items are in your possession at time of discharge: 
 
Dental Appliances: None Visual Aid: Glasses Home Medications: None Jewelry: None Clothing: None Other Valuables: None Personal Items Sent to Safe: n/a PATIENT INSTRUCTIONS: 
 
 
F-face looks uneven A-arms unable to move or move unevenly S-speech slurred or non-existent T-time-call 911 as soon as signs and symptoms begin-DO NOT go Back to bed or wait to see if you get better-TIME IS BRAIN. Warning Signs of HEART ATTACK Call 911 if you have these symptoms: 
? Chest discomfort. Most heart attacks involve discomfort in the center of the chest that lasts more than a few minutes, or that goes away and comes back. It can feel like uncomfortable pressure, squeezing, fullness, or pain. ? Discomfort in other areas of the upper body. Symptoms can include pain or discomfort in one or both arms, the back, neck, jaw, or stomach. ? Shortness of breath with or without chest discomfort. ? Other signs may include breaking out in a cold sweat, nausea, or lightheadedness. Don't wait more than five minutes to call [address]! Fast action can save your life. Calling 911 is almost always the fastest way to get lifesaving treatment. Emergency Medical Services staff can begin treatment when they arrive  up to an hour sooner than if someone gets to the hospital by car. Patient armband removed and shredded MyChart Activation Thank you for requesting access to [facility]. Please follow the instructions below to securely access and download your online medical record. [facility] allows you to send messages to your doctor, view your test results, renew your prescriptions, schedule appointments, and more. How Do I Sign Up? 1. In your internet browser, go to [email or web address] 
2. Click on the First Time User? Click Here link in the Sign In box. You will be redirect to the New Member Sign Up page. 3. Enter your [facility] Access Code exactly as it appears below. You will not need to use this code after youve completed the sign-up process. If you do not sign up before the expiration date, you must request a new code. [facility] Access Code: Activation code not generated Current [facility] Status: Active (This is the date your [facility] access code will ) 4. Enter the last four digits of your Social Security Number (xxxx) and Date of Birth (mm/dd/yyyy) as indicated and click Submit. You will be taken to the next sign-up page. 5. Create a [facility] ID. This will be your [facility] login ID and cannot be changed, so think of one that is secure and easy to remember. 6. Create a [facility] password. You can change your password at any time. 7. Enter your Password Reset Question and Answer. This can be used at a later time if you forget your password. 8. Enter your e-mail address. You will receive e-mail notification when new information is available in [address] 9. Click Sign Up. You can now view and download portions of your medical record. 10. Click the Download Summary menu link to download a portable copy of your medical information. Additional Information If you have questions, please visit the Frequently Asked Questions section of the [facility] website at [email or web address] Remember, [facility] is NOT to be used for urgent needs.  For medical emergencies, dial 911. The discharge information has been reviewed with the patient. The patient verbalized understanding. Discharge medications reviewed with the patient and appropriate educational materials and side effects teaching were provided. Discharge Orders None [employer] Announcement We are excited to announce that we are making your provider's discharge notes available to you in [employer]. You will see these notes when they are completed and signed by the physician that discharged you from your recent hospital stay. If you have any questions or concerns about any information you see in [employer], please call the Health Information Department where you were seen or reach out to your Primary Care Provider for more information about your plan of care. Introducing [facility]! Dear Lida Marino: 
Thank you for requesting a [employer] account. Our records indicate that you already have an active [employer] account. You can access your account anytime at [email or web address] Did you know that you can access your hospital and ER discharge instructions at any time in [employer]? You can also review all of your test results from your hospital stay or ER visit. Additional Information If you have questions, please visit the Frequently Asked Questions section of the [employer] website at [email or web address] Remember, [employer] is NOT to be used for urgent needs. For medical emergencies, dial 911. Now available from your iPhone and Android! General Information Please provide this summary of care documentation to your next provider. Patient Signature:  ____________________________________________________________ Date:  ____________________________________________________________  
  
[facility] Provider Signature:  ____________________________________________________________ Date:  ____________________________________________________________

## 2017-03-17 ENCOUNTER — HOME HEALTH ADMISSION (OUTPATIENT)
Dept: HOME HEALTH SERVICES | Facility: HOME HEALTH | Age: 47
End: 2017-03-17
Payer: COMMERCIAL

## 2017-03-17 ENCOUNTER — TELEPHONE (OUTPATIENT)
Dept: ORTHOPEDIC SURGERY | Age: 47
End: 2017-03-17

## 2017-03-17 VITALS
HEIGHT: 61 IN | RESPIRATION RATE: 18 BRPM | OXYGEN SATURATION: 95 % | SYSTOLIC BLOOD PRESSURE: 143 MMHG | DIASTOLIC BLOOD PRESSURE: 86 MMHG | BODY MASS INDEX: 43.88 KG/M2 | WEIGHT: 232.38 LBS | HEART RATE: 102 BPM | TEMPERATURE: 98.6 F

## 2017-03-17 PROCEDURE — 74011000250 HC RX REV CODE- 250: Performed by: ORTHOPAEDIC SURGERY

## 2017-03-17 PROCEDURE — 97165 OT EVAL LOW COMPLEX 30 MIN: CPT

## 2017-03-17 PROCEDURE — 74011250637 HC RX REV CODE- 250/637: Performed by: ORTHOPAEDIC SURGERY

## 2017-03-17 PROCEDURE — 99218 HC RM OBSERVATION: CPT

## 2017-03-17 PROCEDURE — 97535 SELF CARE MNGMENT TRAINING: CPT

## 2017-03-17 PROCEDURE — 77030036547 HC CLLR CERV FOAM S2SG -A

## 2017-03-17 PROCEDURE — 97116 GAIT TRAINING THERAPY: CPT

## 2017-03-17 RX ORDER — TRAMADOL HYDROCHLORIDE 50 MG/1
50 TABLET ORAL
Qty: 40 TAB | Refills: 0 | Status: SHIPPED | OUTPATIENT
Start: 2017-03-17 | End: 2019-10-24

## 2017-03-17 RX ORDER — TIZANIDINE HYDROCHLORIDE 6 MG/1
6 CAPSULE, GELATIN COATED ORAL
Qty: 90 CAP | Refills: 0 | Status: SHIPPED | OUTPATIENT
Start: 2017-03-17 | End: 2019-10-24

## 2017-03-17 RX ADMIN — DIAZEPAM 5 MG: 5 TABLET ORAL at 09:10

## 2017-03-17 RX ADMIN — GABAPENTIN 300 MG: 300 CAPSULE ORAL at 09:39

## 2017-03-17 RX ADMIN — Medication 10 ML: at 09:39

## 2017-03-17 RX ADMIN — TRAMADOL HYDROCHLORIDE 50 MG: 50 TABLET, FILM COATED ORAL at 06:50

## 2017-03-17 RX ADMIN — FAMOTIDINE 20 MG: 10 INJECTION, SOLUTION INTRAVENOUS at 09:39

## 2017-03-17 NOTE — PROGRESS NOTES
Problem: Self Care Deficits Care Plan (Adult)  Goal: *Acute Goals and Plan of Care (Insert Text)  Occupational Therapy Goals  Initiated 3/17/2017     1. Patient will perform upper/lower body dressing with modified independence with adaptive strategies. -achieved 3/17/17  Outcome: Resolved/Met Date Met:  03/17/17  OCCUPATIONAL THERAPY EVALUATION/DISCHARGE     Patient: Cash Sandoval (5 y.o. female)  Date: 3/17/2017  Primary Diagnosis: HNP (herniated nucleus pulposus) with myelopathy, cervical [M50.00]  Cervical arthritis with myelopathy [M47.12]  Procedure(s) (LRB):  C3-4 ARTHROPLASTY (N/A) 1 Day Post-Op   Precautions:   Fall (cervical)      ASSESSMENT AND RECOMMENDATIONS:  Based on the objective data described below, the patient was independent with functional mobility with no AD and was independent/modified independent with self care tasks. Patient had decreased, but functional BUE AROM, secondary to cervical precautions, and WFL  strength. Patient was independent with toilet transfer/toileting tasks and grooming tasks standing at sink. Educated patient on cervical precautions as it relates to function; patient needed min verbal cues to recall and was independent with implementing them during functional tasks. Educated patient on adaptive strategies during UE/LE dressing while adhering to cervical precautions; patient reported/demonstrated understanding. Patient left cmfortable in chair, in no distress. Discharge Recommendations: Home Health  Further Equipment Recommendations for Discharge: N/A       COMPLEXITY      Eval Complexity: History: LOW Complexity : Brief history review ; Examination: LOW Complexity : 1-3 performance deficits relating to physical, cognitive , or psychosocial skils that result in activity limitations and / or participation restrictions ;  Decision Making:LOW Complexity : No comorbidities that affect functional and no verbal or physical assistance needed to complete eval tasks Assessment: Low Complexity          G-CODES:      Self Care  Current  CI= 1-19%   Goal  CI= 1-19%   D/C  CI= 1-19%. The severity rating is based on the Level of Assistance required for Functional Mobility and ADLs. SUBJECTIVE:   Patient stated I'm ready to go home.       OBJECTIVE DATA SUMMARY:       Past Medical History:   Diagnosis Date    Arthritis       spinal stenosis     Past Surgical History:   Procedure Laterality Date    HX  SECTION         x 3    HX CHOLECYSTECTOMY        HX HERNIA REPAIR         x2     Prior Level of Function/Home Situation: Patient reported she was independent in basic self care tasks and functional mobility PTA. Home Situation  Home Environment: Private residence  # Steps to Enter: 5  One/Two Story Residence: One story  Living Alone: No  Support Systems: Child(haider), Spouse/Significant Other/Partner  Patient Expects to be Discharged to[de-identified] Private residence  Current DME Used/Available at Home: None  Tub or Shower Type: Tub/Shower combination  [X]     Right hand dominant       [ ]     Left hand dominant  Cognitive/Behavioral Status:  Neurologic State: Alert  Orientation Level: Oriented X4  Cognition: Follows commands     Skin: No skin changes noted     Edema: No edema noted     Vision/Perceptual:       Acuity: Within Defined Limits       Coordination:  Coordination: Within functional limits (BUEs)     Balance:  Sitting: Intact  Standing: Intact; Without support      Strength:  Strength:  Within functional limits ( strength)      Tone & Sensation:  Tone: Normal (BUEs)  Sensation: Intact (BUEs)     Range of Motion:  AROM: Generally decreased, functional (BUEs, within cervical precautions)      Functional Mobility and Transfers for ADLs:  Bed Mobility:    Patient in chair upon arrival  Scooting: Independent  Transfers:  Sit to Stand: Independent              Toilet Transfer : Independent                             ADL Assessment:(clincial judgement based on functional mobility)  Feeding: Independent     Oral Facial Hygiene/Grooming: Independent     Bathing: Modified independent     Upper Body Dressing: Modified independent     Lower Body Dressing: Modified independent     Toileting: Independent     Pain:  Pt reports 2/10 pain or discomfort prior to treatment. Pt reports 2/10 pain or discomfort post treatment. Activity Tolerance:   Good     Please refer to the flowsheet for vital signs taken during this treatment. After treatment:   [X]  Patient left in no apparent distress sitting up in chair  [ ]  Patient left in no apparent distress in bed  [X]  Call bell left within reach  [X]  Nursing notified  [X]  Caregiver present  [ ]  Bed alarm activated      COMMUNICATION/EDUCATION:   Communication/Collaboration:  [X]      Home safety education was provided and the patient/caregiver indicated understanding. [X]      Patient/family have participated as able and agree with findings and recommendations. [ ]      Patient is unable to participate in plan of care at this time.      Francai Molina OTR/L  Time Calculation: 45 mins

## 2017-03-17 NOTE — DISCHARGE SUMMARY
Discharge  Summary     Patient: Marisol Ferguson MRN: [id number]  SSN: [id number]    YOB: 1970  Age: 40 y.o. Sex: female       Admit Date: 3/16/2017    Discharge Date: 3/17/2017      Admission Diagnoses: HNP (herniated nucleus pulposus) with myelopathy, cervical [M50.00]  Cervical arthritis with myelopathy [M47.12]    Discharge Diagnoses:   Problem List as of 3/17/2017  Date Reviewed: 3/16/2017          Codes Class Noted - Resolved    Cervical spinal stenosis ICD-10-CM: M48.02  ICD-9-CM: 723.0  2/24/2017 - Present        HNP (herniated nucleus pulposus), cervical ICD-10-CM: M50.20  ICD-9-CM: 722.0  2/24/2017 - Present        Cervical herniated disc ICD-10-CM: M50.20  ICD-9-CM: 722.0  1/23/2017 - Present        Cervical spondylosis without myelopathy ICD-10-CM: M47.812  ICD-9-CM: 721.0  1/23/2017 - Present        Cervical neuritis ICD-10-CM: M54.12  ICD-9-CM: 723.4  1/23/2017 - Present        DDD (degenerative disc disease), cervical ICD-10-CM: M50.30  ICD-9-CM: 722.4  1/23/2017 - Present               Discharge Condition: Good    Procedure: Anterior cervical diskectomy C3-4 with  cervical disk arthroplasty, Mobi-C type.       Hospital Course: Normal hospital course for this procedure. Tolerated surgical intervention well. Incision dry and intact. Ambulatory. Pt had some nausea and vomiting secondary to narcotics which resolved with d/c narcotics. Swallowing well        Disposition: home    Discharge Medications:   Current Discharge Medication List      START taking these medications    Details   !! traMADol (ULTRAM) 50 mg tablet Take 1 Tab by mouth every six (6) hours as needed. Max Daily Amount: 200 mg. Indications: Pain  Qty: 40 Tab, Refills: 0       !! - Potential duplicate medications found. Please discuss with provider.       CONTINUE these medications which have NOT CHANGED    Details   triamcinolone acetonide (KENALOG) 0.1 % topical cream AARON AA BID  Refills: 1      !! traMADol (ULTRAM) 50 mg tablet TK 1 T PO  Q 6 H PRN  Refills: 0      tiZANidine (ZANAFLEX) 6 mg capsule TK 1 C PO TID PRN  Refills: 1      naproxen (NAPROSYN) 500 mg tablet TK 1 T PO BID PRN P  Refills: 2      diazePAM (VALIUM) 5 mg tablet TK ONE T PO Q 6 HOURS PRN  Refills: 1      gabapentin (NEURONTIN) 300 mg capsule Take 1 Cap by mouth three (3) times daily (with meals). Indications: NEUROPATHIC PAIN  Qty: 90 Cap, Refills: 1    Associated Diagnoses: Cervical herniated disc; Cervical spondylosis without myelopathy; Cervical neuritis; DDD (degenerative disc disease), cervical      triamcinolone (NASACORT AQ) 55 mcg nasal inhaler 1 Spray as needed. !! - Potential duplicate medications found. Please discuss with provider. Follow-up Appointments   Procedures    FOLLOW UP VISIT Appointment in: Two Weeks     Standing Status:   Standing     Number of Occurrences:   1     Order Specific Question:   Appointment in     Answer:    Two Weeks       Signed By: Zack Perez NP     March 17, 2017

## 2017-03-17 NOTE — PROGRESS NOTES
Patient is alert and oriented x 4, incision to the anterior neck clean dry and intact, lungs clear bilaterally, bowel sounds active in all quadrants. Patient is tolerating dental soft diet well, no complain of nausea or vomiting, she denies any acute distress, SOB and chest pain. Patient is ambulating independently, voiding without difficulties. Patient is stable, vitals WNL,no apparent distress noted, no neurological deficit noted, placed call bell within reach, will continue to monitor.

## 2017-03-17 NOTE — PROGRESS NOTES
Problem: Mobility Impaired (Adult and Pediatric)  Goal: *Acute Goals and Plan of Care (Insert Text)  Physical Therapy Goals  Initiated 3/16/2017 and to be accomplished within 7 day(s)  1. Patient will move from supine to sit and sit to supine in bed with modified independence. 2. Patient will transfer from bed to chair and chair to bed with modified independence using the least restrictive device. 3. Patient will perform sit to stand with modified independence. 4. Patient will ambulate with modified independence for 300 feet with the least restrictive device. 5. Patient will ascend/descend 4 stairs with 1 handrail(s) with supervision/set-up. Outcome: Resolved/Met Date Met:  03/17/17  PHYSICAL THERAPY TREATMENT/DISCHARGE     Patient: Reina Sherman (21 y.o. female)  Date: 3/17/2017  Diagnosis: HNP (herniated nucleus pulposus) with myelopathy, cervical [M50.00]  Cervical arthritis with myelopathy [M47.12] <principal problem not specified>  Procedure(s) (LRB):  C3-4 ARTHROPLASTY (N/A) 1 Day Post-Op  Precautions: Fall (cervical)  Chart, physical therapy assessment, plan of care and goals were reviewed. ASSESSMENT:  Pt presents today alert and agreeable to therapy. Pt transferred OOB, amb 300ft in hallway, navigated 4 steps with HR, and transferred into locked recliner independently. Pt demonstrates understanding of precautions and is agreeable to D/C from PT at this time. Progression toward goals:  [X]      Goals met  [ ]      Improving appropriately and progressing toward goals  [ ]      Improving slowly and progressing toward goals  [ ]      Not making progress toward goals and plan of care will be adjusted       PLAN:  Patient will be discharged from physical therapy at this time.   Rationale for discharge:  [X] Goals Achieved  [ ] Claria Beat  [ ] Patient not participating in therapy  [ ] Other:  Discharge Recommendations:  Home  Further Equipment Recommendations for Discharge:  N/A       G-CODES: Mobility  Current  CH= 0%   Goal  CH= 0%  D/C  CH= 0%. The severity rating is based on the Level of Assistance required for Functional Mobility and ADLs. SUBJECTIVE:   Patient stated I'm ready to go home.       OBJECTIVE DATA SUMMARY:   Critical Behavior:  Neurologic State: Alert  Orientation Level: Oriented X4  Cognition: Follows commands   Functional Mobility Training:  Bed Mobility:  Rolling: Independent  Supine to Sit: Independent  Sit to Supine: Independent  Scooting: Independent   Transfers:  Sit to Stand: Independent  Stand to Sit: Independent       Balance:  Sitting: Intact  Standing: Intact  Ambulation/Gait Training:  Distance (ft): 300 Feet (ft)  Assistive Device:  (None)  Ambulation - Level of Assistance: Independent     Stairs:  Number of Stairs Trained: 4   Stairs - Level of Assistance: Modified independent with HR                Pain:  Pt reports 0/10 pain or discomfort prior to treatment. Pt reports 0/10 pain or discomfort post treatment. Activity Tolerance:   Pt tolerated activity well and denies chest pain, SOB, or dizziness. Pt is agreeable to d/c from PT. Please refer to the flowsheet for vital signs taken during this treatment.   After treatment:   [X] Patient left in no apparent distress sitting up in chair  [ ] Patient left in no apparent distress in bed  [X] Call bell left within reach  [ ] Nursing notified  [X] Caregiver present  [ ] Bed alarm activated  Tammy Cruz PT   Time Calculation: 14 mins

## 2017-03-17 NOTE — ROUTINE PROCESS
Patient in bed AAOx4, vomited 250 of greenish liquid, patient stated that she was vomiting several times x4. [facility] notified new order for Phenergan. 2120 Patient has anterior neck dressing dry and clean,cervical collar in place. Neuro intact, move all extremities, denies numbness/tingling. Family at bedside. 0030 Patient sleeping during rounds ,No N/V reported. 0430 Patient tolerating po intake. Denies incisional pain, Neuro intact.

## 2017-03-17 NOTE — PROGRESS NOTES
Care Management Interventions  PCP Verified by CM: Yes  Mode of Transport at Discharge: BLS  Transition of Care Consult (CM Consult): [address]: Yes  MyChart Signup: No  Discharge Durable Medical Equipment: No  Physical Therapy Consult: Yes  Occupational Therapy Consult: Yes  Speech Therapy Consult: No  Current Support Network: Lives with Spouse  Confirm Follow Up Transport: Family  Plan discussed with Pt/Family/Caregiver: Yes  Freedom of Choice Offered: Yes  Discharge Location  Discharge Placement: Home with home health     Late Entry:  39 y/o female admitted with HNP. Pt is on Observation Status. Pt signed the Observation letter and copy was given. Pt states her [place]  [facility] [place], [address]. She states she hasn't needed any DME or HH in the past. She states the only equipment she needs is a raised toilet seat. She states she is  and plans on going back home with [facility]. FOC given and signed by pt and she chose [facility]. [facility] notified and they are also aware of pt discharging to her physical addrees.

## 2017-03-17 NOTE — HOME CARE
[facility] received referral for PT - Darron Protocol** - verified patient's physical address as [address] [place] [address], [address] - phone number is correct - lives with spouse Brody Ferreira and children. No DME recommended by therapy but patient is requesting a raised toilet seat which is not covered by insurance - patient has been made aware and states, \"That's fine. \" Noted patient has been discharged and referral has been processed for [facility] 3/18/17 - S.  Beverely Angers LPN

## 2017-03-17 NOTE — PROGRESS NOTES
Patient is discharge in stable condition, discharge instructions were reviewed and given to patient, appropriate education such as medications administration, incision site care and spine precaution and more were given.  Patient verbalized understanding, opportunity to ask questions was given addressed all questions and concerns to patient satisfaction, patient left the unit at  1355

## 2017-03-17 NOTE — DISCHARGE INSTRUCTIONS
DISCHARGE SUMMARY from Nurse    The following personal items are in your possession at time of discharge:    Dental Appliances: None  Visual Aid: Glasses     Home Medications: None  Jewelry: None  Clothing: None  Other Valuables: None  Personal Items Sent to Safe: n/a          PATIENT INSTRUCTIONS:    After general anesthesia or intravenous sedation, for 24 hours or while taking prescription Narcotics:  · Limit your activities  · Do not drive and operate hazardous machinery  · Do not make important personal or business decisions  · Do  not drink alcoholic beverages  · If you have not urinated within 8 hours after discharge, please contact your surgeon on call. Report the following to your surgeon:  · Excessive pain, swelling, redness or odor of or around the surgical area  · Temperature over 100.5  · Nausea and vomiting lasting longer than 4 hours or if unable to take medications  · Any signs of decreased circulation or nerve impairment to extremity: change in color, persistent  numbness, tingling, coldness or increase pain  · Any questions        What to do at Home:  Recommended activity: Activity as tolerated    *  Please give a list of your current medications to your Primary Care Provider. *  Please update this list whenever your medications are discontinued, doses are      changed, or new medications (including over-the-counter products) are added. *  Please carry medication information at all times in case of emergency situations. These are general instructions for a healthy lifestyle:    No smoking/ No tobacco products/ Avoid exposure to second hand smoke    Surgeon General's Warning:  Quitting smoking now greatly reduces serious risk to your health.     Obesity, smoking, and sedentary lifestyle greatly increases your risk for illness    A healthy diet, regular physical exercise & weight monitoring are important for maintaining a healthy lifestyle    You may be retaining fluid if you have a history of heart failure or if you experience any of the following symptoms:  Weight gain of 3 pounds or more overnight or 5 pounds in a week, increased swelling in our hands or feet or shortness of breath while lying flat in bed. Please call your doctor as soon as you notice any of these symptoms; do not wait until your next office visit. Recognize signs and symptoms of STROKE:    F-face looks uneven    A-arms unable to move or move unevenly    S-speech slurred or non-existent    T-time-call 911 as soon as signs and symptoms begin-DO NOT go       Back to bed or wait to see if you get better-TIME IS BRAIN. Warning Signs of HEART ATTACK     Call 911 if you have these symptoms:   Chest discomfort. Most heart attacks involve discomfort in the center of the chest that lasts more than a few minutes, or that goes away and comes back. It can feel like uncomfortable pressure, squeezing, fullness, or pain.  Discomfort in other areas of the upper body. Symptoms can include pain or discomfort in one or both arms, the back, neck, jaw, or stomach.  Shortness of breath with or without chest discomfort.  Other signs may include breaking out in a cold sweat, nausea, or lightheadedness. Don't wait more than five minutes to call 911 - MINUTES MATTER! Fast action can save your life. Calling 911 is almost always the fastest way to get lifesaving treatment. Emergency Medical Services staff can begin treatment when they arrive -- up to an hour sooner than if someone gets to the hospital by car. Patient armband removed and shredded  MyChart Activation    Thank you for requesting access to [facility]. Please follow the instructions below to securely access and download your online medical record. [facility] allows you to send messages to your doctor, view your test results, renew your prescriptions, schedule appointments, and more. How Do I Sign Up? 1. In your internet browser, go to [email or web address]  2.  Click on the First Time User? Click Here link in the Sign In box. You will be redirect to the New Member Sign Up page. 3. Enter your Donayt Access Code exactly as it appears below. You will not need to use this code after youve completed the sign-up process. If you do not sign up before the expiration date, you must request a new code. MyChart Access Code: Activation code not generated  Current Bluet[facility] Status: Active (This is the date your MyChart access code will )    4. Enter the last four digits of your Social Security Number (xxxx) and Date of Birth (mm/dd/yyyy) as indicated and click Submit. You will be taken to the next sign-up page. 5. Create a Donayt ID. This will be your Bluetector login ID and cannot be changed, so think of one that is secure and easy to remember. 6. Create a Donayt password. You can change your password at any time. 7. Enter your Password Reset Question and Answer. This can be used at a later time if you forget your password. 8. Enter your e-mail address. You will receive e-mail notification when new information is available in [address] 9. Click Sign Up. You can now view and download portions of your medical record. 10. Click the Download Summary menu link to download a portable copy of your medical information. Additional Information    If you have questions, please visit the Frequently Asked Questions section of the [employer] website at [email or web address] Remember, Bl[facility] is NOT to be used for urgent needs. For medical emergencies, dial 911. The discharge information has been reviewed with the patient. The patient verbalized understanding. Discharge medications reviewed with the patient and appropriate educational materials and side effects teaching were provided.

## 2017-03-17 NOTE — ROUTINE PROCESS
Bedside and Verbal shift change report given to Marilu RN (oncoming nurse) by Linda Mike RN (offgoing nurse). Report included the following information SBAR, Kardex, MAR and Recent Results.     SITUATION:    Code Status: Prior   Reason for Admission: HNP (herniated nucleus pulposus) with myelopathy, cervical [M50.00]   Cervical arthritis with myelopathy [facility] day: 0   Problem List:       Hospital Problems  Date Reviewed: 3/16/2017    None          BACKGROUND:    Past Medical History:   Past Medical History:   Diagnosis Date    Arthritis     spinal stenosis         Patient taking anticoagulants no     ASSESSMENT:    Changes in Assessment Throughout Shift: no     Patient has Central Line: no Reasons if yes: n/a   Patient has Miller Cath: no Reasons if yes: n/a      Last Vitals:     Vitals:    03/16/17 1602 03/16/17 1922 03/17/17 0024 03/17/17 0425   BP: 122/79 123/80 110/73 123/74   Pulse: 88 97 97 88   Resp: 18 16 16 16   Temp: 97.6 °F (36.4 °C) 98 °F (36.7 °C) 97.3 °F (36.3 °C) 98.8 °F (37.1 °C)   SpO2: 92% 93% 93% 95%   Weight:       Height:       LMP: 03/02/2017        IV and DRAINS (will only show if present)   Peripheral IV 03/16/17 Left Hand-Site Assessment: Clean, dry, & intact  Peripheral IV 03/16/17 Right Hand-Site Assessment: Clean, dry, & intact     WOUND (if present)   Wound Type:  none   Dressing present Dressing Present : No   Wound Concerns/Notes:  none     PAIN    Pain Assessment    Pain Intensity 1: 0 (03/17/17 0449)    Pain Location 1: Neck    Pain Intervention(s) 1: Medication (see MAR)    Patient Stated Pain Goal: 0  o Interventions for Pain:  none  o Intervention effective: no  o Time of last intervention: 0645   o Reassessment Completed: no      Last 3 Weights:  Last 3 Recorded Weights in this Encounter    03/10/17 1537 03/16/17 0732   Weight: 105.7 kg (233 lb) 105.4 kg (232 lb 6 oz)     Weight change:      INTAKE/OUPUT    Current Shift: 03/16 1901 - 03/17 0700  In: 1260 [P.O.:960; I.V.:300]  Out: 750 [Urine:750]    Last three shifts: 03/15 0701 - 03/16 1900  In: 1000 [I.V.:1000]  Out: 125 [Urine:100]     LAB RESULTS   No results for input(s): WBC, HGB, HCT, PLT, HGBEXT, HCTEXT, PLTEXT in the last 72 hours. No results for input(s): NA, K, GLU, BUN, CREA, CA, MG, INR in the last 72 hours. No lab exists for component: PT, PTT, INREXT    RECOMMENDATIONS AND DISCHARGE PLANNING     1. Pending tests/procedures/ Plan of Care or Other Needs: PT     2. Discharge plan for patient and Needs/Barriers: HOME    3. Estimated Discharge Date: 3/17/17 Posted on Whiteboard in Patients Room: no      4. The patient's care plan was reviewed with the oncoming nurse. \"HEALS\" SAFETY CHECK      Fall Risk    Total Score: 4    Safety Measures: Safety Measures: Bed in low position, Call light within reach    A safety check occurred in the patient's room between off going nurse and oncoming nurse listed above. The safety check included the below items  Area Items   H  High Alert Medications - Verify all high alert medication drips (heparin, PCA, etc.)   E  Equipment - Suction is set up for ALL patients (with sal)  - Red plugs utilized for all equipment (IV pumps, etc.)  - WOWs wiped down at end of shift.  - Room stocked with oxygen, suction, and other unit-specific supplies   A  Alarms - Bed alarm is set for fall risk patients  - Ensure chair alarm is in place and activated if patient is up in a chair   L  Lines - Check IV for any infiltration  - Miller bag is empty if patient has a Miller   - Tubing and IV bags are labeled   S  Safety   - Room is clean, patient is clean, and equipment is clean. - Hallways are clear from equipment besides carts. - Fall bracelet on for fall risk patients  - Ensure room is clear and free of clutter  - Suction is set up for ALL patients (with sal)  - Hallways are clear from equipment besides carts.    - Isolation precautions followed, supplies available outside room, sign posted     Nuris Monroe RN

## 2017-03-17 NOTE — TELEPHONE ENCOUNTER
Ruthann Hubbard from [address] [place] [address] called. Pt was written pain medication but pt is also requesting  Muscle Relaxer. Ruthann Hubbard wants to know is the pt to come by office to pick that up since Dr is not returning to [facility]? Please advise Ruthann Hubbard asap since pt is being discharged today 03/17/17 at [phone].

## 2017-03-19 ENCOUNTER — HOME CARE VISIT (OUTPATIENT)
Dept: HOME HEALTH SERVICES | Facility: HOME HEALTH | Age: 47
End: 2017-03-19

## 2017-03-20 ENCOUNTER — HOME CARE VISIT (OUTPATIENT)
Dept: HOME HEALTH SERVICES | Facility: HOME HEALTH | Age: 47
End: 2017-03-20
Payer: COMMERCIAL

## 2017-03-20 ENCOUNTER — HOME CARE VISIT (OUTPATIENT)
Dept: SCHEDULING | Facility: HOME HEALTH | Age: 47
End: 2017-03-20
Payer: COMMERCIAL

## 2017-03-20 VITALS
HEART RATE: 77 BPM | TEMPERATURE: 97.8 F | DIASTOLIC BLOOD PRESSURE: 86 MMHG | OXYGEN SATURATION: 98 % | SYSTOLIC BLOOD PRESSURE: 128 MMHG

## 2017-03-20 PROCEDURE — G0151 HHCP-SERV OF PT,EA 15 MIN: HCPCS

## 2017-03-20 PROCEDURE — 400013 HH SOC

## 2017-03-21 ENCOUNTER — HOME CARE VISIT (OUTPATIENT)
Dept: HOME HEALTH SERVICES | Facility: HOME HEALTH | Age: 47
End: 2017-03-21
Payer: COMMERCIAL

## 2017-03-29 ENCOUNTER — TELEPHONE (OUTPATIENT)
Dept: ORTHOPEDIC SURGERY | Age: 47
End: 2017-03-29

## 2017-03-30 ENCOUNTER — OFFICE VISIT (OUTPATIENT)
Dept: ORTHOPEDIC SURGERY | Age: 47
End: 2017-03-30

## 2017-03-30 VITALS
HEIGHT: 61 IN | TEMPERATURE: 98.8 F | OXYGEN SATURATION: 97 % | RESPIRATION RATE: 18 BRPM | HEART RATE: 85 BPM | SYSTOLIC BLOOD PRESSURE: 148 MMHG | DIASTOLIC BLOOD PRESSURE: 85 MMHG | WEIGHT: 232 LBS | BODY MASS INDEX: 43.8 KG/M2

## 2017-03-30 DIAGNOSIS — Z98.1 S/P CERVICAL SPINAL FUSION: Primary | ICD-10-CM

## 2017-03-30 NOTE — PATIENT INSTRUCTIONS
Cervical Spinal Fusion: What to Expect at Home  Your Recovery    After surgery, you can expect your neck to feel stiff and sore. This should improve in the weeks after surgery. You may have trouble sitting or standing in one position for very long and may need pain medicine in the weeks after your surgery. You may need to wear a neck brace for a while. It may take 4 to 6 weeks to get back to your usual activities, but it may depend on what kind of surgery you had. Your doctor may advise you to work with a physical therapist to strengthen the muscles around your neck and back. This care sheet gives you a general idea about how long it will take for you to recover. But each person recovers at a different pace. Follow the steps below to get better as quickly as possible. How can you care for yourself at home? Activity  · Rest when you feel tired. Getting enough sleep will help you recover. · Try to walk each day. Start by walking a little more than you did the day before. Bit by bit, increase the amount you walk. Walking boosts blood flow and helps prevent pneumonia and constipation. Walking may also decrease your muscle soreness after surgery. · Follow your doctor's directions about not lifting anything that would strain your neck and back. This may include a child, heavy grocery bags and milk containers, a heavy briefcase or backpack, cat litter or dog food bags, or a vacuum . · Avoid strenuous activities, such as bicycle riding, jogging, weightlifting, or aerobic exercise, until your doctor says it is okay. · Do not drive for 2 to 4 weeks after your surgery or until your doctor says it is okay. · Avoid taking long car trips for 2 to 4 weeks after surgery. Your neck may become tired and painful from sitting too long in one position. · You will probably need to take 4 to 6 weeks off from work. It depends on the type of work you do and how you feel.   · You may have sex as soon as you feel able, but avoid positions that put stress on your neck or cause pain. Diet  · You can eat your normal diet. If your stomach is upset, try bland, low-fat foods like plain rice, broiled chicken, toast, and yogurt. · Drink plenty of fluids. If you have kidney, heart, or liver disease and have to limit fluids, talk with your doctor before you increase the amount of fluids you drink. · You may notice that your bowel movements are not regular right after your surgery. This is common. Try to avoid constipation and straining with bowel movements. You may want to take a fiber supplement every day. If you have not had a bowel movement after a couple of days, ask your doctor about taking a mild laxative. Medicines  · Your doctor will tell you if and when you can restart your medicines. He or she will also give you instructions about taking any new medicines. · If you take blood thinners, such as warfarin (Coumadin), clopidogrel (Plavix), or aspirin, be sure to talk to your doctor. He or she will tell you if and when to start taking those medicines again. Make sure that you understand exactly what your doctor wants you to do. · Be safe with medicines. Take pain medicines exactly as directed. ¨ If the doctor gave you a prescription medicine for pain, take it as prescribed. ¨ If you are not taking a prescription pain medicine, ask your doctor if you can take an over-the-counter medicine. · If your doctor prescribed antibiotics, take them as directed. Do not stop taking them just because you feel better. You need to take the full course of antibiotics. · If you think your pain pill is making you sick to your stomach:  ¨ Take your pills after meals (unless your doctor has told you not to). ¨ Ask your doctor for a different pain pill. Incision care  · You will be given specific instructions about how to care for the cut (incision) the doctor made.  The instructions will depend on the type of materials used to close the cut.  Exercise  · Do exercises as instructed by your doctor or physical therapist to improve your strength and flexibility. Other instructions  · To reduce stiffness and help sore muscles, use a warm water bottle, a heating pad set on low, or a warm cloth on your neck. Do not put heat right over the incision. Do not go to sleep with a heating pad on your skin. Follow-up care is a key part of your treatment and safety. Be sure to make and go to all appointments, and call your doctor if you are having problems. It's also a good idea to know your test results and keep a list of the medicines you take. When should you call for help? Call 911 anytime you think you may need emergency care. For example, call if:  · You passed out (lost consciousness). · You have sudden chest pain and shortness of breath, or you cough up blood. · You cannot swallow. · You have severe pain in your neck or back. · You are unable to move an arm or a leg at all. Call your doctor now or seek immediate medical care if:  · You have pain that does not get better after you take pain pills. · You have signs of infection, such as:  ¨ Increased pain, swelling, warmth, or redness. ¨ Red streaks leading from the site. ¨ Pus draining from the site. ¨ A fever. · You have new or worse symptoms in your arms, legs, chest, belly, or buttocks. Symptoms may include:  ¨ Numbness or tingling. ¨ Weakness. ¨ Pain. · You lose bladder or bowel control. · You have loose stitches, or your incision comes open. · You have blood or fluid draining from the incision. Watch closely for changes in your health, and be sure to contact your doctor if:  · You do not have a bowel movement after taking a laxative. · You are not getting better as expected. Where can you learn more? Go to http://keon-khoi.info/. Enter L049 in the search box to learn more about \"Cervical Spinal Fusion: What to Expect at Home. \"  Current as of: May 23, 2016  Content Version: 11.2  © 5669-0300 [employer]. Care instructions adapted under license by [employer] (which disclaims liability or warranty for this information). If you have questions about a medical condition or this instruction, always ask your healthcare professional. [address] any warranty or liability for your use of this information.

## 2017-03-30 NOTE — PROGRESS NOTES
Chief complaint/History of Present Illness:  Chief Complaint   Patient presents with    Neck Pain    Surgical Follow-up     HPI  Dayday Acosta is a  40 y.o.  female      HISTORY OF PRESENT ILLNESS:  The patient comes in today two weeks status post her ACDF at C3-4. She is doing much better. She states her neck pain and left greater than right arm pain are very much better. Her dexterity is also improved. She can see a difference in her handwriting. She feels like her balance is about the same and the numbness and tingling she has in her hands and fingers, although it is improved, is still there. She is swallowing okay. She is taking Tramadol for any discomfort, but it is starting to give her GI upset, so she is going to wean off of that. She gets some tightness in her trapezius and paracervical muscles. She does have Zanaflex 6 mg to use as-needed. She is swallowing good. She denies fever or bowel or bladder dysfunction. She works in finance. She is a nonsmoker. PHYSICAL EXAM:  Ms. Nahomy Ocampo is a 61-year-old female. She is alert and oriented. She has a full weight bearing, non-antalgic gait. She has a fairly normal tandem gait. She has 5/5 strength of the bilateral upper extremities, negative Crawleys. Her anterior cervical incision is healing nicely. The edges are well approximated. There is no erythema, warmth, drainage, or signs of infection. ASSESSENT/PLAN:  This is a patient two weeks out from her ACDF at C3-4. She is pleased with the outcome of her surgery. We went over wound care and activity level. She is going to use heat and massage on those tight muscles and the Zanaflex as-needed. We will see her back in four weeks with Dr. Dixon Luis, at which time, we will get a cervical AP and lateral x-ray. She will remain out of work until then.         Review of systems:    Past Medical History:   Diagnosis Date    Arthritis     spinal stenosis     Past Surgical History:   Procedure Laterality Date    HX  SECTION      x 3    HX CHOLECYSTECTOMY      HX HERNIA REPAIR      x2    NEUROLOGICAL PROCEDURE UNLISTED  2017    C3-4 Arthroplasty     Social History     Social History    Marital status: UNKNOWN     Spouse name: N/A    Number of children: N/A    Years of education: N/A     Occupational History    Not on file. Social History Main Topics    Smoking status: Never Smoker    Smokeless tobacco: Never Used    Alcohol use No    Drug use: No    Sexual activity: Not on file     Other Topics Concern    Not on file     Social History Narrative     Family History   Problem Relation Age of Onset    Family history unknown: Yes       Physical Exam:  Visit Vitals    /85    Pulse 85    Temp 98.8 °F (37.1 °C) (Oral)    Resp 18    Ht 5' 1\" (1.549 m)    Wt 232 lb (105.2 kg)    LMP 2017    SpO2 97%    BMI 43.84 kg/m2     Pain Scale: 2/10     has been . reviewed and is appropriate        [place] was seen today for neck pain and surgical follow-up. Diagnoses and all orders for this visit:    S/P cervical spinal fusion            Follow-up Disposition:  Return in about 4 weeks (around 2017) for with Dr. Ander Hanley.         We have informed Roger Talamantes to notify us for immediate appointment if she has any worsening neurogical symptoms or if an emergency situation presents, then call [phone]

## 2017-03-30 NOTE — MR AVS SNAPSHOT
Visit Information Date & Time Provider Department Dept. Phone Encounter #  
 3/30/2017 10:00 AM Dhruv Pemberton NP [facility] [phone] [id number] Your Appointments 3/30/2017 10:00 AM  
POST OP with Dhruv Pemberton NP  
[facility] ([place] [facility]onica) Appt Note: surg 3-16  
 [address] [place] [address]  
[phone]  
  
   
 [address]  
  
    
 4/28/2017  8:00 AM  
POST OP with Maris Marc MD  
[facility] [address] [facility]onica) Appt Note: 6wk fu surg 3-16  
 [address] [place] [address] [phone]  
  
   
 [address] [place] [address] Upcoming Health Maintenance Date Due DTaP/Tdap/Td series (1 - Tdap) 11/6/1992 PAP AKA CERVICAL CYTOLOGY 11/6/1992 INFLUENZA AGE 9 TO ADULT 8/1/2016 Allergies as of 3/30/2017  Review Complete On: 3/30/2017 By: Dhruv Pemberton NP Severity Noted Reaction Type Reactions Fentanyl  03/30/2017    Nausea and Vomiting Iodine  03/06/2014    Hives Oxycodone  10/10/2011    Other (comments) Penicillin G  10/10/2011    Other (comments) Current Immunizations  Never Reviewed No immunizations on file. Not reviewed this visit You Were Diagnosed With   
  
 Codes Comments S/P cervical spinal fusion    -  Primary ICD-10-CM: Z98.1 ICD-9-CM: V45.4 Vitals BP Pulse Temp Resp Height(growth percentile) Weight(growth percentile) 148/85 85 98.8 °F (37.1 °C) (Oral) 18 5' 1\" (1.549 m) 232 lb (105.2 kg) LMP SpO2 BMI OB Status Smoking Status 03/02/2017 97% 43.84 kg/m2 Having regular periods Never Smoker BMI and BSA Data Body Mass Index Body Surface Area  
 43.84 kg/m 2 2.13 m 2 Preferred Pharmacy Pharmacy Name Phone [facility] [address] [facility], [facility] [address] [facility] [phone] Your Updated Medication List  
  
   
This list is accurate as of: 3/30/17  8:54 AM.  Always use your most recent med list.  
  
  
  
  
 diazePAM 5 mg tablet Commonly known as:  VALIUM Take 5 mg by mouth nightly as needed for Anxiety or Sleep. To relax muscle tension. Has not needed for a while, per patient repsot.  
  
 gabapentin 300 mg capsule Commonly known as:  NEURONTIN Take 1 Cap by mouth three (3) times daily (with meals). Indications: NEUROPATHIC PAIN  
  
 naproxen 500 mg tablet Commonly known as:  NAPROSYN Take 500 mg by mouth two (2) times daily (with meals). PRN. For pain and inflammation. Not taking at this time after surgery. GALLEGO 500 mg Tab Generic drug:  magnesium oxide Take 2 Caps by mouth daily. For constipation * tiZANidine 6 mg capsule Commonly known as:  Clearance Breonna TK 1 C PO TID PRN  
  
 * tiZANidine 6 mg capsule Commonly known as:  Clearance Breonna Take 1 Cap by mouth three (3) times daily as needed. Indications: MUSCLE SPASM * traMADol 50 mg tablet Commonly known as:  ULTRAM  
TK 1 T PO  Q 6 H PRN  
  
 * traMADol 50 mg tablet Commonly known as:  ULTRAM  
Take 1 Tab by mouth every six (6) hours as needed. Max Daily Amount: 200 mg. Indications: Pain * triamcinolone 55 mcg nasal inhaler Commonly known as:  NASACORT AQ  
1 [place] by Both Nostrils route as needed (Seasonal Allergies). * triamcinolone acetonide 0.1 % topical cream  
Commonly known as:  KENALOG Apply 1 Each to affected area two (2) times daily as needed for Other (Psoriasis). * Notice: This list has 6 medication(s) that are the same as other medications prescribed for you. Read the directions carefully, and ask your doctor or other care provider to review them with you. Patient Instructions Cervical Spinal Fusion: What to Expect at [facility] Your Recovery After surgery, you can expect your neck to feel stiff and sore. This should improve in the weeks after surgery. You may have trouble sitting or standing in one position for very long and may need pain medicine in the weeks after your surgery. You may need to wear a neck brace for a while. It may take 4 to 6 weeks to get back to your usual activities, but it may depend on what kind of surgery you had. Your doctor may advise you to work with a physical therapist to strengthen the muscles around your neck and back. This care sheet gives you a general idea about how long it will take for you to recover. But each person recovers at a different pace. Follow the steps below to get better as quickly as possible. How can you care for yourself at home? Activity · Rest when you feel tired. Getting enough sleep will help you recover. · Try to walk each day. Start by walking a little more than you did the day before. Bit by bit, increase the amount you walk. Walking boosts blood flow and helps prevent pneumonia and constipation. Walking may also decrease your muscle soreness after surgery. · Follow your doctor's directions about not lifting anything that would strain your neck and back. This may include a child, heavy grocery bags and milk containers, a heavy briefcase or backpack, cat litter or dog food bags, or a vacuum . · Avoid strenuous activities, such as bicycle riding, jogging, weightlifting, or aerobic exercise, until your doctor says it is okay. · Do not drive for 2 to 4 weeks after your surgery or until your doctor says it is okay. · Avoid taking long car trips for 2 to 4 weeks after surgery. Your neck may become tired and painful from sitting too long in one position. · You will probably need to take 4 to 6 weeks off from work. It depends on the type of work you do and how you feel. · You may have sex as soon as you feel able, but avoid positions that put stress on your neck or cause pain. Diet · You can eat your normal diet. If your stomach is upset, try bland, low-fat foods like plain rice, broiled chicken, toast, and yogurt. · Drink plenty of fluids. If you have kidney, heart, or liver disease and have to limit fluids, talk with your doctor before you increase the amount of fluids you drink. · You may notice that your bowel movements are not regular right after your surgery. This is common. Try to avoid constipation and straining with bowel movements. You may want to take a fiber supplement every day. If you have not had a bowel movement after a couple of days, ask your doctor about taking a mild laxative. Medicines · Your doctor will tell you if and when you can restart your medicines. He or she will also give you instructions about taking any new medicines. · If you take blood thinners, such as warfarin (Coumadin), clopidogrel (Plavix), or aspirin, be sure to talk to your doctor. He or she will tell you if and when to start taking those medicines again. Make sure that you understand exactly what your doctor wants you to do. · Be safe with medicines. Take pain medicines exactly as directed. ¨ If the doctor gave you a prescription medicine for pain, take it as prescribed. ¨ If you are not taking a prescription pain medicine, ask your doctor if you can take an over-the-counter medicine. · If your doctor prescribed antibiotics, take them as directed. Do not stop taking them just because you feel better. You need to take the full course of antibiotics. · If you think your pain pill is making you sick to your stomach: 
¨ Take your pills after meals (unless your doctor has told you not to). ¨ Ask your doctor for a different pain pill. Incision care · You will be given specific instructions about how to care for the cut (incision) the doctor made. The instructions will depend on the type of materials used to close the cut. Exercise · Do exercises as instructed by your doctor or physical therapist to improve your strength and flexibility. Other instructions · To reduce stiffness and help sore muscles, use a warm water bottle, a heating pad set on low, or a warm cloth on your neck. Do not put heat right over the incision. Do not go to sleep with a heating pad on your skin. Follow-up care is a key part of your treatment and safety. Be sure to make and go to all appointments, and call your doctor if you are having problems. It's also a good idea to know your test results and keep a list of the medicines you take. When should you call for help? Call 911 anytime you think you may need emergency care. For example, call if: 
· You passed out (lost consciousness). · You have sudden chest pain and shortness of breath, or you cough up blood. · You cannot swallow. · You have severe pain in your neck or back. · You are unable to move an arm or a leg at all. Call your doctor now or seek immediate medical care if: 
· You have pain that does not get better after you take pain pills. · You have signs of infection, such as: 
¨ Increased pain, swelling, warmth, or redness. ¨ Red streaks leading from the site. ¨ Pus draining from the site. ¨ A fever. · You have new or worse symptoms in your arms, legs, chest, belly, or buttocks. Symptoms may include: ¨ Numbness or tingling. ¨ Weakness. ¨ Pain. · You lose bladder or bowel control. · You have loose stitches, or your incision comes open. · You have blood or fluid draining from the incision. Watch closely for changes in your health, and be sure to contact your doctor if: 
· You do not have a bowel movement after taking a laxative. · You are not getting better as expected. Where can you learn more? Go to http://keon-khoi.info/. Enter P225 in the search box to learn more about \"Cervical Spinal Fusion: What to Expect at Home. \" Current as of: May 23, 2016 Content Version: 11.2 © 5925-5902 [employer]. Care instructions adapted under license by [employer] (which disclaims liability or warranty for this information). If you have questions about a medical condition or this instruction, always ask your healthcare professional. [address] any warranty or liability for your use of this information. Introducing [facility] & HEALTH SERVICES! Dear Ilene Butler: 
Thank you for requesting a Liibook account. Our records indicate that you already have an active Liibook account. You can access your account anytime at [email or web address] Did you know that you can access your hospital and ER discharge instructions at any time in Liibook? You can also review all of your test results from your hospital stay or ER visit. Additional Information If you have questions, please visit the Frequently Asked Questions section of the Liibook website at [email or web address] Remember, Liibook is NOT to be used for urgent needs. For medical emergencies, dial 911. Now available from your iPhone and Android! Please provide this summary of care documentation to your next provider. Your primary care clinician is listed as Kylee Chacko. If you have any questions after today's visit, please call [phone].

## 2017-04-04 ENCOUNTER — DOCUMENTATION ONLY (OUTPATIENT)
Dept: ORTHOPEDIC SURGERY | Age: 47
End: 2017-04-04

## 2017-04-06 ENCOUNTER — TELEPHONE (OUTPATIENT)
Dept: ORTHOPEDIC SURGERY | Age: 47
End: 2017-04-06

## 2017-04-06 NOTE — TELEPHONE ENCOUNTER
Patient went to her PCP on 03/31/17 for severe headaches, nausea, head spinning and diarrhea. Not flu or strept. Put back on her HBP medication Hydrochlorothiazide 25mg and Ondansitron 8mg for nausea. Patient calling as her symptoms have not decreased and she just had C 3-4 arthroplasty diskectomy 03/16/17.  Please call her back about these symptons and her neck surgery [phone]

## 2017-04-06 NOTE — TELEPHONE ENCOUNTER
When I saw her on the 30th of March, she was doing well regarding her surgery. She must have a virus.   Have her return to PCP for re-evaluaton

## 2017-04-28 ENCOUNTER — OFFICE VISIT (OUTPATIENT)
Dept: ORTHOPEDIC SURGERY | Age: 47
End: 2017-04-28

## 2017-04-28 VITALS
TEMPERATURE: 97.1 F | OXYGEN SATURATION: 99 % | HEIGHT: 61 IN | SYSTOLIC BLOOD PRESSURE: 144 MMHG | WEIGHT: 232.6 LBS | RESPIRATION RATE: 18 BRPM | DIASTOLIC BLOOD PRESSURE: 78 MMHG | BODY MASS INDEX: 43.92 KG/M2 | HEART RATE: 85 BPM

## 2017-04-28 DIAGNOSIS — Z98.1 S/P CERVICAL SPINAL FUSION: Primary | ICD-10-CM

## 2017-04-28 DIAGNOSIS — M48.02 CERVICAL SPINAL STENOSIS: ICD-10-CM

## 2017-04-28 RX ORDER — PROMETHAZINE HYDROCHLORIDE 25 MG/1
25 SUPPOSITORY RECTAL
COMMUNITY
Start: 2017-03-31 | End: 2019-05-06 | Stop reason: ALTCHOICE

## 2017-04-28 RX ORDER — ACYCLOVIR 400 MG/1
TABLET ORAL
COMMUNITY
Start: 2017-04-14

## 2017-04-28 RX ORDER — MECLIZINE HYDROCHLORIDE 25 MG/1
25 TABLET ORAL
COMMUNITY
Start: 2017-04-26

## 2017-04-28 RX ORDER — ONDANSETRON HYDROCHLORIDE 8 MG/1
8 TABLET, FILM COATED ORAL
COMMUNITY
Start: 2017-03-31 | End: 2019-05-06 | Stop reason: ALTCHOICE

## 2017-04-28 NOTE — LETTER
4/28/2017 8:36 AM 
 
Ms. Williams Kent [address] [address] [address] [address] To Whom It May Concern: 
 
Williams Kent is currently under the care of [address] [place] [address]. She may return to work 5/1/17 without restrictions. If there are questions or concerns please have the patient contact our office.  
 
 
 
Sincerely, 
 
 
 
 
 
Macario Alvarado MD

## 2017-04-28 NOTE — PROGRESS NOTES
[facility]  [address] [facility]  Phone: [phone]  Fax: [phone]  PROGRESS NOTE  Patient: Malena Nagy                MRN: [id number]       SSN: [id number]  YOB: 1970        AGE: 40 y.o. SEX: female  Body mass index is 43.95 kg/(m^2). PCP: Elizabeth Gambino MD  04/28/17    Chief Complaint   Patient presents with    Neck Pain     6 week post op       HISTORY OF PRESENT ILLNESS, RADIOGRAPHS, and PLAN:     HISTORY:  Ms. Christy Masters returns today. She is six weeks out from her cervical disc arthroplasty. She is doing well. She is asymptomatic. Her arm pain is gone. Her neck pain is gone. Her wound is healed and dry. She has full range of motion of her cervical spine. RADIOGRAPHS:  Radiographs studies demonstrate a well-placed cervical disc arthroplasty with good alignment. ASSESSMENT/PLAN: She is starting work on Monday. I will see her back again in six weeks time for routine followup. cc: Elizabeth Gambino M.D. Past Medical History:   Diagnosis Date    Arthritis     spinal stenosis       Family History   Problem Relation Age of Onset    Family history unknown: Yes       Current Outpatient Prescriptions   Medication Sig Dispense Refill    acyclovir (ZOVIRAX) 400 mg tablet Take one tid for 5 days prn cold sores      meclizine (ANTIVERT) 25 mg tablet 25 mg.      ondansetron hcl (ZOFRAN) 8 mg tablet 8 mg.  promethazine (PHENERGAN) 25 mg suppository 25 mg.      magnesium oxide (GALLEGO) 500 mg tab Take 2 Caps by mouth daily. For constipation      diazePAM (VALIUM) 5 mg tablet Take 5 mg by mouth nightly as needed for Anxiety or Sleep. To relax muscle tension. Has not needed for a while, per patient repsot. 1    naproxen (NAPROSYN) 500 mg tablet Take 500 mg by mouth two (2) times daily (with meals). PRN. For pain and inflammation. Not taking at this time after surgery.   2    triamcinolone (NASACORT AQ) 55 mcg nasal inhaler 1 [place] by Both Nostrils route as needed (Seasonal Allergies).  triamcinolone acetonide (KENALOG) 0.1 % topical cream Apply 1 Each to affected area two (2) times daily as needed for Other (Psoriasis). 1    traMADol (ULTRAM) 50 mg tablet Take 1 Tab by mouth every six (6) hours as needed. Max Daily Amount: 200 mg. Indications: Pain 40 Tab 0    tiZANidine (ZANAFLEX) 6 mg capsule Take 1 Cap by mouth three (3) times daily as needed. Indications: MUSCLE SPASM 90 Cap 0    traMADol (ULTRAM) 50 mg tablet TK 1 T PO  Q 6 H PRN  0    tiZANidine (ZANAFLEX) 6 mg capsule TK 1 C PO TID PRN  1    gabapentin (NEURONTIN) 300 mg capsule Take 1 Cap by mouth three (3) times daily (with meals). Indications: NEUROPATHIC PAIN 90 Cap 1       Allergies   Allergen Reactions    Fentanyl Nausea and Vomiting    Iodine Hives    Oxycodone Other (comments)    Penicillin G Other (comments)       Past Surgical History:   Procedure Laterality Date    HX  SECTION      x 3    HX CHOLECYSTECTOMY      HX HERNIA REPAIR      x2    NEUROLOGICAL PROCEDURE UNLISTED  2017    C3-4 Arthroplasty       Past Medical History:   Diagnosis Date    Arthritis     spinal stenosis       Social History     Social History    Marital status: UNKNOWN     Spouse name: N/A    Number of children: N/A    Years of education: N/A     Occupational History    Not on file. Social History Main Topics    Smoking status: Never Smoker    Smokeless tobacco: Never Used    Alcohol use No    Drug use: No    Sexual activity: Not on file     Other Topics Concern    Not on file     Social History Narrative     REVIEW OF SYSTEMS:   CONSTITUTIONAL SYMPTOMS:  Negative. EYES:  Negative. EARS, NOSE, THROAT AND MOUTH:  Negative. CARDIOVASCULAR:  Negative. RESPIRATORY:  Negative. GENITOURINARY: Negative. GASTROINTESTINAL:  Negative. INTEGUMENTARY (SKIN AND/OR BREAST):  Negative.    MUSCULOSKELETAL: Per HPI.   ENDOCRINE/RHEUMATOLOGIC:  Negative. NEUROLOGICAL:  Per HPI. HEMATOLOGIC/LYMPHATIC:  Negative. ALLERGIC/IMMUNOLOGIC:  Negative. PSYCHIATRIC:  Negative. PHYSICAL EXAMINATION:   Visit Vitals    /78    Pulse 85    Temp 97.1 °F (36.2 °C) (Oral)    Resp 18    Ht 5' 1\" (1.549 m)    Wt 232 lb 9.6 oz (105.5 kg)    SpO2 99%    BMI 43.95 kg/m2    PAIN SCALE: 0 - No pain/10    CONSTITUTIONAL: The patient is in no apparent distress and is alert and oriented x 3. HEENT: Normocephalic. Hearing grossly intact. NECK: Supple and symmetric. no tenderness, or masses were felt. RESPIRATORY: No labored breathing. CARDIOVASCULAR: The carotid pulses were normal. Peripheral pulses were 2+. CHEST: Normal AP diameter and normal contour without any kyphoscoliosis. LYMPHATIC: No lymphadenopathy was appreciated in the neck, axillae or groin. SKIN:  Incision healing well, no drainage, no erythema, no hernia, no seroma, no swelling, no dehiscence, incision well approximated. Negative for scars, rashes, lesions, or ulcers on the right upper, right lower, left upper, left lower and trunk. NEUROLOGICAL: Alert and oriented x 3. Ambulation without assistive device. FWB. EXTREMITIES: See musculoskeletal.  MUSCULOSKELETAL:   Head and Neck: No pain with ROM. Negative for misalignment, asymmetry, crepitation, defects, tenderness masses or effusions.  Left Upper Extremity: Inspection, percussion and palpation preformed. Crawleys sign is negative.  Right Upper Extremity: Inspection, percussion and palpation preformed. Crawleys sign is negative.  Spine, Ribs and Pelvis: Inspection, percussion and palpation preformed. Negative for misalignment, asymmetry, crepitation, defects, tenderness masses or effusions.  Left Lower Extremity: Inspection, percussion and palpation preformed. Negative straight leg raise.  Right Lower Extremity: Inspection, percussion and palpation preformed.    Negative straight leg raise. SPINE EXAM:     Cervical spine: Neck is midline. Normal muscle tone. No focal atrophy is noted. ASSESSMENT    ICD-10-CM ICD-9-CM    1. S/P cervical spinal fusion Z98.1 V45.4    2. Cervical spinal stenosis M48.02 723.0 AMB POC XRAY, SPINE, CERVICAL; 2 OR 3       Written by Cinthia Adam, as dictated by Edith Sorensen MD.    I, Dr. Edith Sorensen MD, confirm that all documentation is accurate.

## 2017-04-28 NOTE — MR AVS SNAPSHOT
Visit Information Date & Time Provider Department Dept. Phone Encounter #  
 4/28/2017  8:00 AM Paul Oliva MD 4 [facility], [address] and [facility] [phone] [id number] Follow-up Instructions Return in about 6 weeks (around 6/9/2017). Follow-up and Disposition History Upcoming Health Maintenance Date Due DTaP/Tdap/Td series (1 - Tdap) 11/6/1992 PAP AKA CERVICAL CYTOLOGY 11/6/1992 INFLUENZA AGE 9 TO ADULT 8/1/2016 Allergies as of 4/28/2017  Review Complete On: 4/28/2017 By: Lorrie Mccormack Severity Noted Reaction Type Reactions Fentanyl  03/30/2017    Nausea and Vomiting Iodine  03/06/2014    Hives Oxycodone  10/10/2011    Other (comments) Penicillin G  10/10/2011    Other (comments) Current Immunizations  Never Reviewed No immunizations on file. Not reviewed this visit You Were Diagnosed With   
  
 Codes Comments S/P cervical spinal fusion    -  Primary ICD-10-CM: Z98.1 ICD-9-CM: V45.4 Cervical spinal stenosis     ICD-10-CM: M48.02 
ICD-9-CM: 723.0 Vitals BP Pulse Temp Resp Height(growth percentile) Weight(growth percentile) 144/78 85 97.1 °F (36.2 °C) (Oral) 18 5' 1\" (1.549 m) 232 lb 9.6 oz (105.5 kg) SpO2 BMI OB Status Smoking Status 99% 43.95 kg/m2 Having regular periods Never Smoker BMI and BSA Data Body Mass Index Body Surface Area 43.95 kg/m 2 2.13 m 2 Preferred Pharmacy Pharmacy Name Phone [facility] [address] [place] [address] AT [facility] [phone] Your Updated Medication List  
  
   
This list is accurate as of: 4/28/17  8:42 AM.  Always use your most recent med list.  
  
  
  
  
 acyclovir 400 mg tablet Commonly known as:  ZOVIRAX Take one tid for 5 days prn cold sores  
  
 diazePAM 5 mg tablet Commonly known as:  VALIUM  
 Take 5 mg by mouth nightly as needed for Anxiety or Sleep. To relax muscle tension. Has not needed for a while, per patient repsot.  
  
 gabapentin 300 mg capsule Commonly known as:  NEURONTIN Take 1 Cap by mouth three (3) times daily (with meals). Indications: NEUROPATHIC PAIN  
  
 meclizine 25 mg tablet Commonly known as:  ANTIVERT 25 mg.  
  
 naproxen 500 mg tablet Commonly known as:  NAPROSYN Take 500 mg by mouth two (2) times daily (with meals). PRN. For pain and inflammation. Not taking at this time after surgery. ondansetron hcl 8 mg tablet Commonly known as:  ZOFRAN  
8 mg. GALLEGO 500 mg Tab Generic drug:  magnesium oxide Take 2 Caps by mouth daily. For constipation  
  
 promethazine 25 mg suppository Commonly known as:  PHENERGAN  
25 mg.  
  
 * tiZANidine 6 mg capsule Commonly known as:  Kirt Pearce TK 1 C PO TID PRN  
  
 * tiZANidine 6 mg capsule Commonly known as:  Kirt Ramses Take 1 Cap by mouth three (3) times daily as needed. Indications: MUSCLE SPASM * traMADol 50 mg tablet Commonly known as:  ULTRAM  
TK 1 T PO  Q 6 H PRN  
  
 * traMADol 50 mg tablet Commonly known as:  ULTRAM  
Take 1 Tab by mouth every six (6) hours as needed. Max Daily Amount: 200 mg. Indications: Pain * triamcinolone 55 mcg nasal inhaler Commonly known as:  NASACORT AQ  
1 [place] by Both Nostrils route as needed (Seasonal Allergies). * triamcinolone acetonide 0.1 % topical cream  
Commonly known as:  KENALOG Apply 1 Each to affected area two (2) times daily as needed for Other (Psoriasis). * Notice: This list has 6 medication(s) that are the same as other medications prescribed for you. Read the directions carefully, and ask your doctor or other care provider to review them with you. We Performed the Following AMB POC XRAY, SPINE, CERVICAL; 2 OR 3 [31928 CPT(R)] Follow-up Instructions Return in about 6 weeks (around 6/9/2017). Patient Instructions Neck Pain: Care Instructions Your Care Instructions You can have neck pain anywhere from the bottom of your head to the top of your shoulders. It can spread to the upper back or arms. Injuries, painting a ceiling, sleeping with your neck twisted, staying in one position for too long, and many other activities can cause neck pain. Most neck pain gets better with home care. Your doctor may recommend medicine to relieve pain or relax your muscles. He or she may suggest exercise and physical therapy to increase flexibility and relieve stress. You may need to wear a special (cervical) collar to support your neck for a day or two. Follow-up care is a key part of your treatment and safety. Be sure to make and go to all appointments, and call your doctor if you are having problems. It's also a good idea to know your test results and keep a list of the medicines you take. How can you care for yourself at home? · Try using a heating pad on a low or medium setting for 15 to 20 minutes every 2 or 3 hours. Try a warm shower in place of one session with the heating pad. · You can also try an ice pack for 10 to 15 minutes every 2 to 3 hours. Put a thin cloth between the ice and your skin. · Take pain medicines exactly as directed. ¨ If the doctor gave you a prescription medicine for pain, take it as prescribed. ¨ If you are not taking a prescription pain medicine, ask your doctor if you can take an over-the-counter medicine. · If your doctor recommends a cervical collar, wear it exactly as directed. When should you call for help? Call your doctor now or seek immediate medical care if: 
· You have new or worsening numbness in your arms, buttocks or legs. · You have new or worsening weakness in your arms or legs. (This could make it hard to stand up.) · You lose control of your bladder or bowels. Watch closely for changes in your health, and be sure to contact your doctor if: 
· Your neck pain is getting worse. · You are not getting better after 1 week. · You do not get better as expected. Where can you learn more? Go to http://keon-khoi.info/. Enter [id number] in the search box to learn more about \"Neck Pain: Care Instructions. \" Current as of: May 23, 2016 Content Version: 11.2 © 6274-3474 goviral. Care instructions adapted under license by [employer]ono (which disclaims liability or warranty for this information). If you have questions about a medical condition or this instruction, always ask your healthcare professional. [address] any warranty or liability for your use of this information. Neck: Exercises Your Care Instructions Here are some examples of typical rehabilitation exercises for your condition. Start each exercise slowly. Ease off the exercise if you start to have pain. Your doctor or physical therapist will tell you when you can start these exercises and which ones will work best for you. How to do the exercises Note: Stretching should make you feel a gentle stretch, but no pain. Stop any strengthening exercise that makes pain worse. Neck stretch 1. This stretch works best if you keep your shoulder down as you lean away from it. To help you remember to do this, start by relaxing your shoulders and lightly holding on to your thighs or your chair. 2. Tilt your head toward your shoulder and hold for 15 to 30 seconds. Let the weight of your head stretch your muscles. 3. If you would like a little added stretch, use your hand to gently and steadily pull your head toward your shoulder. For example, keeping your right shoulder down, lean your head to the left. 4. Repeat 2 to 4 times toward each shoulder. Diagonal neck stretch 1.  Turn your head slightly toward the direction you will be stretching, and tilt your head diagonally toward your chest and hold for 15 to 30 seconds. 2. If you would like a little added stretch, use your hand to gently and steadily pull your head forward on the diagonal. 
3. Repeat 2 to 4 times toward each side. Dorsal glide stretch 1. Sit or stand tall and look straight ahead. 2. Slowly tuck your chin as you glide your head backward over your body 3. Hold for a count of 6, and then relax for up to 10 seconds. 4. Repeat 8 to 12 times. Note: The dorsal glide stretches the back of the neck. If you feel pain, do not glide so far back. Some people find this exercise easier to do while lying on their backs with an ice pack on the neck. Chest and shoulder stretch 1. Sit or stand tall and glide your head backward as in the dorsal glide stretch. 2. Raise both arms so that your hands are next to your ears. 3. Take a deep breath, and as you breathe out, lower your elbows down and behind your back. You will feel your shoulder blades slide down and together, and at the same time you will feel a stretch across your chest and the front of your shoulders. 4. Hold for about 6 seconds, and then relax for up to 10 seconds. 5. Repeat 8 to 12 times. Strengthening: Hands on head 1. Move your head backward, forward, and side to side against gentle pressure from your hands, holding each position for about 6 seconds. 2. Repeat 8 to 12 times. Follow-up care is a key part of your treatment and safety. Be sure to make and go to all appointments, and call your doctor if you are having problems. It's also a good idea to know your test results and keep a list of the medicines you take. Where can you learn more? Go to http://keon-khoi.info/. Enter P975 in the search box to learn more about \"Neck: Exercises. \" Current as of: May 23, 2016 Content Version: 11.2 © 6462-1002 Tradeos, Incorporated.  Care instructions adapted under license by [address] (which disclaims liability or warranty for this information). If you have questions about a medical condition or this instruction, always ask your healthcare professional. [address] any warranty or liability for your use of this information. Patient Instructions History Introducing [facility]! Dear Dennis Glover: 
Thank you for requesting a [email or web address] account. Our records indicate that you already have an active [email or web address] account. You can access your account anytime at [email or web address] Did you know that you can access your hospital and ER discharge instructions at any time in [email or web address]? You can also review all of your test results from your hospital stay or ER visit. Additional Information If you have questions, please visit the Frequently Asked Questions section of the [email or web address] website at [email or web address] Remember, [email or web address] is NOT to be used for urgent needs. For medical emergencies, dial 911. Now available from your iPhone and Android! Please provide this summary of care documentation to your next provider. Your primary care clinician is listed as Lavinia Alejo. If you have any questions after today's visit, please call [phone].

## 2017-04-28 NOTE — PATIENT INSTRUCTIONS
Neck Pain: Care Instructions  Your Care Instructions  You can have neck pain anywhere from the bottom of your head to the top of your shoulders. It can spread to the upper back or arms. Injuries, painting a ceiling, sleeping with your neck twisted, staying in one position for too long, and many other activities can cause neck pain. Most neck pain gets better with home care. Your doctor may recommend medicine to relieve pain or relax your muscles. He or she may suggest exercise and physical therapy to increase flexibility and relieve stress. You may need to wear a special (cervical) collar to support your neck for a day or two. Follow-up care is a key part of your treatment and safety. Be sure to make and go to all appointments, and call your doctor if you are having problems. It's also a good idea to know your test results and keep a list of the medicines you take. How can you care for yourself at home? · Try using a heating pad on a low or medium setting for 15 to 20 minutes every 2 or 3 hours. Try a warm shower in place of one session with the heating pad. · You can also try an ice pack for 10 to 15 minutes every 2 to 3 hours. Put a thin cloth between the ice and your skin. · Take pain medicines exactly as directed. ¨ If the doctor gave you a prescription medicine for pain, take it as prescribed. ¨ If you are not taking a prescription pain medicine, ask your doctor if you can take an over-the-counter medicine. · If your doctor recommends a cervical collar, wear it exactly as directed. When should you call for help? Call your doctor now or seek immediate medical care if:  · You have new or worsening numbness in your arms, buttocks or legs. · You have new or worsening weakness in your arms or legs. (This could make it hard to stand up.)  · You lose control of your bladder or bowels.   Watch closely for changes in your health, and be sure to contact your doctor if:  · Your neck pain is getting worse.  · You are not getting better after 1 week. · You do not get better as expected. Where can you learn more? Go to http://keon-khoi.info/. Enter [id number] in the search box to learn more about \"Neck Pain: Care Instructions. \"  Current as of: May 23, 2016  Content Version: 11.2  © 1761-5704 TheShelf. Care instructions adapted under license by BlikBook (which disclaims liability or warranty for this information). If you have questions about a medical condition or this instruction, always ask your healthcare professional. [place] [address] any warranty or liability for your use of this information. Neck: Exercises  Your Care Instructions  Here are some examples of typical rehabilitation exercises for your condition. Start each exercise slowly. Ease off the exercise if you start to have pain. Your doctor or physical therapist will tell you when you can start these exercises and which ones will work best for you. How to do the exercises  Note: Stretching should make you feel a gentle stretch, but no pain. Stop any strengthening exercise that makes pain worse. Neck stretch    1. This stretch works best if you keep your shoulder down as you lean away from it. To help you remember to do this, start by relaxing your shoulders and lightly holding on to your thighs or your chair. 2. Tilt your head toward your shoulder and hold for 15 to 30 seconds. Let the weight of your head stretch your muscles. 3. If you would like a little added stretch, use your hand to gently and steadily pull your head toward your shoulder. For example, keeping your right shoulder down, lean your head to the left. 4. Repeat 2 to 4 times toward each shoulder. Diagonal neck stretch    1. Turn your head slightly toward the direction you will be stretching, and tilt your head diagonally toward your chest and hold for 15 to 30 seconds.   2. If you would like a little added stretch, use your hand to gently and steadily pull your head forward on the diagonal.  3. Repeat 2 to 4 times toward each side. Dorsal glide stretch    1. Sit or stand tall and look straight ahead. 2. Slowly tuck your chin as you glide your head backward over your body  3. Hold for a count of 6, and then relax for up to 10 seconds. 4. Repeat 8 to 12 times. Note: The dorsal glide stretches the back of the neck. If you feel pain, do not glide so far back. Some people find this exercise easier to do while lying on their backs with an ice pack on the neck. Chest and shoulder stretch    1. Sit or stand tall and glide your head backward as in the dorsal glide stretch. 2. Raise both arms so that your hands are next to your ears. 3. Take a deep breath, and as you breathe out, lower your elbows down and behind your back. You will feel your shoulder blades slide down and together, and at the same time you will feel a stretch across your chest and the front of your shoulders. 4. Hold for about 6 seconds, and then relax for up to 10 seconds. 5. Repeat 8 to 12 times. Strengthening: Hands on head    1. Move your head backward, forward, and side to side against gentle pressure from your hands, holding each position for about 6 seconds. 2. Repeat 8 to 12 times. Follow-up care is a key part of your treatment and safety. Be sure to make and go to all appointments, and call your doctor if you are having problems. It's also a good idea to know your test results and keep a list of the medicines you take. Where can you learn more? Go to http://keon-khoi.info/. Enter P975 in the search box to learn more about \"Neck: Exercises. \"  Current as of: May 23, 2016  Content Version: 11.2  © 3344-8177 [employer]ta [employer], Incorporated. Care instructions adapted under license by Glowbl (which disclaims liability or warranty for this information).  If you have questions about a medical condition or this instruction, always ask your healthcare professional. [place] [address] any warranty or liability for your use of this information.

## 2017-06-28 ENCOUNTER — OFFICE VISIT (OUTPATIENT)
Dept: ORTHOPEDIC SURGERY | Age: 47
End: 2017-06-28

## 2017-06-28 VITALS
BODY MASS INDEX: 44.97 KG/M2 | SYSTOLIC BLOOD PRESSURE: 131 MMHG | TEMPERATURE: 98.8 F | HEART RATE: 89 BPM | DIASTOLIC BLOOD PRESSURE: 84 MMHG | RESPIRATION RATE: 16 BRPM | WEIGHT: 238 LBS

## 2017-06-28 DIAGNOSIS — M48.02 CERVICAL SPINAL STENOSIS: Primary | ICD-10-CM

## 2017-06-28 NOTE — PROGRESS NOTES
[address]  [place] [address]  Phone: [phone]  Fax: [phone]    Spine Post-Op Office Visit Note    Patient: Faustino Victoria   MRN: [id number]     Age:  40 y.o.,      Sex: female    YOB: 1970     PCP: Joseph Travis MD    HISTORY OF PRESENT ILLNESS:  Chief Complaint   Patient presents with    Neck Pain     Faustino Victoria is a 40 y.o.  female with history of neck pain. Patient had a cervical disc arthoplast surgery 12  weeks ago. She has no neck or arm pain. She is doing great and feeling much better. Patient denies any bladder/bowel dysfunction, new onset weakness, or other neurological deficits. ASSESSMENT   S/P disc arthroplasty   Platina was seen today for neck pain. Diagnoses and all orders for this visit:    Cervical spinal stenosis          IMPRESSION AND PLAN   1) Pt was given information on post op care of the neck. 2) We will see her back on the anniversary of her surgery. She is doing great. 3) Wound care and activity level reviewed. 4) Ms. Tatum Le has a reminder for a \"due or due soon\" health maintenance. I have asked that she contact her primary care provider, Joseph Travis MD, for follow-up on this health maintenance. 5) We have informed the patient to notify us for immediate appointment if he has any worsening neurogical symptoms or if an emergency situation presents, then call 911  6)  demonstrated consistency with prescribing.    7) Pt will follow-up in 1 year with NP.             SUBJECTIVE    Pain Scale: 0 - No pain/10    Pain Assessment  6/28/2017   Location of Pain Neck   Location Modifiers -   Severity of Pain 0   Quality of Pain -   Quality of Pain Comment -   Duration of Pain -   Frequency of Pain Intermittent   Aggravating Factors -   Aggravating Factors Comment -   Limiting Behavior -   Relieving Factors -   Relieving Factors Comment -   Result of Injury -   Work-Related Injury - Reports symptoms have significantly improved since surgery. Denies numbness or tingling to the arm as prior to surgery. Currently taking The patient is not having any pain. [place] Setting Her appetite is good. She is eating a regular diet without difficulty. Patient denies  difficulties swallowing. Reports no fever. Patient is currently:  employed  . Review of systems  Constitutional: Negative for fever, chills, or weight change. Respiratory: Negative for cough or shortness of breath. Cardiovascular: Negative for chest pain or palpitations. Gastrointestinal: Negative for acid reflux, change in bowel habits, or constipation. Genitourinary: Negative for dysuria and flank pain. Musculoskeletal: negative for  pain. Skin: Negative for rash. Neurological: Negative for headaches, dizziness, or numbness. Endo/Heme/Allergies: Negative for increased bruising. Psychiatric/Behavioral: Negative for difficulty with sleep. Past Medical History:   Diagnosis Date    Arthritis     spinal stenosis       Past Surgical History:   Procedure Laterality Date    HX  SECTION      x 3    HX CHOLECYSTECTOMY      HX HERNIA REPAIR      x2    NEUROLOGICAL PROCEDURE UNLISTED  2017    C3-4 Arthroplasty       Current Outpatient Prescriptions   Medication Sig Dispense Refill    acyclovir (ZOVIRAX) 400 mg tablet Take one tid for 5 days prn cold sores      meclizine (ANTIVERT) 25 mg tablet 25 mg.      ondansetron hcl (ZOFRAN) 8 mg tablet 8 mg.  promethazine (PHENERGAN) 25 mg suppository 25 mg.      magnesium oxide (GALLEGO) 500 mg tab Take 2 Caps by mouth daily. For constipation      diazePAM (VALIUM) 5 mg tablet Take 5 mg by mouth nightly as needed for Anxiety or Sleep. To relax muscle tension. Has not needed for a while, per patient repsot. 1    naproxen (NAPROSYN) 500 mg tablet Take 500 mg by mouth two (2) times daily (with meals). PRN. For pain and inflammation.   Not taking at this time after surgery. 2    triamcinolone (NASACORT AQ) 55 mcg nasal inhaler 1 [place] by Both Nostrils route as needed (Seasonal Allergies).  triamcinolone acetonide (KENALOG) 0.1 % topical cream Apply 1 Each to affected area two (2) times daily as needed for Other (Psoriasis). 1    traMADol (ULTRAM) 50 mg tablet Take 1 Tab by mouth every six (6) hours as needed. Max Daily Amount: 200 mg. Indications: Pain 40 Tab 0    tiZANidine (ZANAFLEX) 6 mg capsule Take 1 Cap by mouth three (3) times daily as needed. Indications: MUSCLE SPASM 90 Cap 0    traMADol (ULTRAM) 50 mg tablet TK 1 T PO  Q 6 H PRN  0    tiZANidine (ZANAFLEX) 6 mg capsule TK 1 C PO TID PRN  1    gabapentin (NEURONTIN) 300 mg capsule Take 1 Cap by mouth three (3) times daily (with meals). Indications: NEUROPATHIC PAIN 90 Cap 1       Allergies   Allergen Reactions    Fentanyl Nausea and Vomiting    Iodine Hives    Oxycodone Other (comments)    Penicillin G Other (comments)            OBJECTIVE    Vitals:    06/28/17 1509   BP: 131/84   Pulse: 89   Resp: 16   Temp: 98.8 °F (37.1 °C)   TempSrc: Oral   Weight: 238 lb (108 kg)   PainSc:   0 - No pain   PainLoc: Neck       Physical Exam:  General: alert, cooperative, no distress, appears stated age  Constitutional:  Well developed, well nourished, in no acute distress. Psychiatric: Affect and mood are appropriate. Integumentary: No rashes or abrasions noted on exposed areas. Wound: Incision healing well, has well approximated edges, no erythema, warmth, drainage or signs of infection. Cardiovascular/Peripheral Vascular: No peripheral edema is noted. Lymphatic:  No evidence of lymphedema. No cervical lymphadenopathy. MOTOR    Biceps  Triceps Deltoids Wrist Ext Wrist Flex Hand Intrin   Right +4/5 +4/5 +4/5 +4/5 +4/5 +4/5   Left +4/5 +4/5 +4/5 +4/5 +4/5 +4/5       normal gait and station      Ambulation without assistive device.  Full- weight bearing, non-antalgic gait.    Accompanied by self.       Brenda Lind NP  June 28, 2017  3:19 PM

## 2017-06-28 NOTE — MR AVS SNAPSHOT
Visit Information Date & Time Provider Department Dept. Phone Encounter #  
 6/28/2017  3:00 PM Jacqueline Dickey NP [facility] [phone] [id number] Follow-up Instructions Return in about 1 year (around 6/28/2018) for with NP. Upcoming Health Maintenance Date Due DTaP/Tdap/Td series (1 - Tdap) 11/6/1992 PAP AKA CERVICAL CYTOLOGY 11/6/1992 INFLUENZA AGE 9 TO ADULT 8/1/2017 Allergies as of 6/28/2017  Review Complete On: 6/28/2017 By: Melissa Peeling Severity Noted Reaction Type Reactions Fentanyl  03/30/2017    Nausea and Vomiting Iodine  03/06/2014    Hives Oxycodone  10/10/2011    Other (comments) Penicillin G  10/10/2011    Other (comments) Current Immunizations  Never Reviewed No immunizations on file. Not reviewed this visit Vitals BP Pulse Temp Resp Weight(growth percentile) BMI  
 131/84 (BP 1 Location: Left arm, BP Patient Position: Sitting) 89 98.8 °F (37.1 °C) (Oral) 16 238 lb (108 kg) 44.97 kg/m2 OB Status Smoking Status Having regular periods Never Smoker BMI and BSA Data Body Mass Index Body Surface Area 44.97 kg/m 2 2.16 m 2 Preferred Pharmacy Pharmacy Name Phone [facility] [address] [place] [address] AT [facility] [phone] Your Updated Medication List  
  
   
This list is accurate as of: 6/28/17  3:19 PM.  Always use your most recent med list.  
  
  
  
  
 acyclovir 400 mg tablet Commonly known as:  ZOVIRAX Take one tid for 5 days prn cold sores  
  
 diazePAM 5 mg tablet Commonly known as:  VALIUM Take 5 mg by mouth nightly as needed for Anxiety or Sleep. To relax muscle tension. Has not needed for a while, per patient repsot.  
  
 gabapentin 300 mg capsule Commonly known as:  NEURONTIN Take 1 Cap by mouth three (3) times daily (with meals).  Indications: NEUROPATHIC PAIN  
  
 meclizine 25 mg tablet Commonly known as:  ANTIVERT 25 mg.  
  
 naproxen 500 mg tablet Commonly known as:  NAPROSYN Take 500 mg by mouth two (2) times daily (with meals). PRN. For pain and inflammation. Not taking at this time after surgery. ondansetron hcl 8 mg tablet Commonly known as:  ZOFRAN  
8 mg. GALLEGO 500 mg Tab Generic drug:  magnesium oxide Take 2 Caps by mouth daily. For constipation  
  
 promethazine 25 mg suppository Commonly known as:  PHENERGAN  
25 mg.  
  
 * tiZANidine 6 mg capsule Commonly known as:  Keith Rival TK 1 C PO TID PRN  
  
 * tiZANidine 6 mg capsule Commonly known as:  Keith Rival Take 1 Cap by mouth three (3) times daily as needed. Indications: MUSCLE SPASM * traMADol 50 mg tablet Commonly known as:  ULTRAM  
TK 1 T PO  Q 6 H PRN  
  
 * traMADol 50 mg tablet Commonly known as:  ULTRAM  
Take 1 Tab by mouth every six (6) hours as needed. Max Daily Amount: 200 mg. Indications: Pain * triamcinolone 55 mcg nasal inhaler Commonly known as:  NASACORT AQ  
1 [place] by Both Nostrils route as needed (Seasonal Allergies). * triamcinolone acetonide 0.1 % topical cream  
Commonly known as:  KENALOG Apply 1 Each to affected area two (2) times daily as needed for Other (Psoriasis). * Notice: This list has 6 medication(s) that are the same as other medications prescribed for you. Read the directions carefully, and ask your doctor or other care provider to review them with you. Follow-up Instructions Return in about 1 year (around 6/28/2018) for with NP. Introducing [facility]! Dear Alana Leblanc: 
Thank you for requesting a [employer] account. Our records indicate that you already have an active [employer] account. You can access your account anytime at [email or web address] Did you know that you can access your hospital and ER discharge instructions at any time in [employer]? You can also review all of your test results from your hospital stay or ER visit. Additional Information If you have questions, please visit the Frequently Asked Questions section of the [employer] website at [email or web address] Remember, [employer] is NOT to be used for urgent needs. For medical emergencies, dial 911. Now available from your iPhone and Android! Please provide this summary of care documentation to your next provider. Your primary care clinician is listed as Alex Mcgee. If you have any questions after today's visit, please call [phone].

## 2019-01-28 ENCOUNTER — OFFICE VISIT (OUTPATIENT)
Dept: ORTHOPEDIC SURGERY | Age: 49
End: 2019-01-28

## 2019-01-28 VITALS
RESPIRATION RATE: 18 BRPM | BODY MASS INDEX: 45.42 KG/M2 | OXYGEN SATURATION: 99 % | HEIGHT: 61 IN | WEIGHT: 240.6 LBS | DIASTOLIC BLOOD PRESSURE: 79 MMHG | TEMPERATURE: 99 F | HEART RATE: 84 BPM | SYSTOLIC BLOOD PRESSURE: 120 MMHG

## 2019-01-28 DIAGNOSIS — M54.12 CERVICAL NEURITIS: ICD-10-CM

## 2019-01-28 DIAGNOSIS — M51.04 HNP (HERNIATED NUCLEUS PULPOSUS WITH MYELOPATHY), THORACIC: ICD-10-CM

## 2019-01-28 DIAGNOSIS — M54.2 NECK PAIN: Primary | ICD-10-CM

## 2019-01-28 DIAGNOSIS — L40.50 PSORIATIC ARTHRITIS (HCC): ICD-10-CM

## 2019-01-28 DIAGNOSIS — M96.1 CERVICAL POST-LAMINECTOMY SYNDROME: ICD-10-CM

## 2019-01-28 DIAGNOSIS — M54.50 LUMBAR PAIN: ICD-10-CM

## 2019-01-28 PROBLEM — E66.01 OBESITY, MORBID (HCC): Status: ACTIVE | Noted: 2019-01-28

## 2019-01-28 RX ORDER — HYDROCHLOROTHIAZIDE 25 MG/1
25 TABLET ORAL DAILY
COMMUNITY
End: 2019-11-06

## 2019-01-28 RX ORDER — CHOLECALCIFEROL (VITAMIN D3) 125 MCG
4000 CAPSULE ORAL DAILY
COMMUNITY
End: 2019-11-06

## 2019-01-28 RX ORDER — LANOLIN ALCOHOL/MO/W.PET/CERES
1000 CREAM (GRAM) TOPICAL DAILY
COMMUNITY
End: 2019-11-06

## 2019-01-28 NOTE — PROGRESS NOTES
[facility]  [address]  Phone: [phone]  Fax: [phone]        PROGRESS NOTE      HISTORY OF PRESENT ILLNESS:  The patient is a 53 y.o. female and was seen today for follow up of progressive neck pain into the BUE (L>R) into the shoulders since October 2016. Patient can replicate her symptoms with cervical extension. She rates pain 4/10. Patient notes initial onset of symptoms roughly 9 years ago which has been intermittent since. No specific injury. She has flare ups every couple of months that are more frequent now. She denies hx of cervical spinal surgery or injections. Patient reports of \"temporary paralysis. \" Note from Nick Forman NP 1/3/17 indicating patient was seen for neck pain. Of note, patient noted improvement with medications Tramadol. Patient was also started on Prednisone and prescribed Zanaflex without relief. Referral note from Monique Chávez PA-C dated 1/4/17 indicating patient has been referred to me to evaluate neck pain extending into the UE. ER note dated 1/8/17 indicating patient presented with neck pain into the BUE. Physical therapy notes were reviewed. She completes physical therapy without relief. Patient continues her HEP daily. She is taking Neurontin 100 mg BID which she tolerates without relief. She denies possibility of pregnancy or breastfeeding due to hx of tubal ligation and hysterectomy. Patient denies change in bowel or bladder habits. Patient denies fever, weight loss, or skin changes. She denies hx of DM. Cervical spine XR dated 12/15/16 per report revealed no significant abnormalities. Cervical spine MRI dated 1/19/17 reviewed. Per report, severe central canal stenosis and associated spinal cord compression at C3-C4 due to large disc protrusion. Elsewhere, no central canal stenosis. Degenerative changes. Subtle signal changes involving the cervical spinal cord at C3-C4.  The patient is RH dominant. At her last clinical appointment, the patient presented with neck pain into the bilateral upper extremities, left worse than right. Patient had subtle signal changes note on her cervical spine MRI with loss of balance noted on tandem gait. I referred patient to Dr. Satish Dickerson for surgical evaluation. Patient was instructed to bring her cervical spine MRI films to her visit with Dr. Starr Perdomo. She had been tolerating Neurontin 100 mg BID. I increased the dose of her Neurontin to 300 mg TID. The patient returns today with c/o progressive upper back pain near the left shoulder, radiating into the chest/abdomen with paraesthesias in the BUE to all digits of the hands x 3 months. She endorses a recurrence of weakness in the LUE. She really has widespread pain complaints with a migratory pain pattern. She rates her pain 4/10. I last saw this patient 1/23/17. She was referred to Dr. Starr Perdomo and scheduled to f/u prn. She denies specific injury or trauma. Pt denies falls or LOB. Pain is exacerbated with sitting. BUE symptoms are not worsened positionally. She denies a hx of thoracic/lumbar spinal surgery, injections, or PT. She denies recent PT. Pt has treated sxs with Tramadol and muscle relaxers prn. She discontinued Neurontin due to surgery, but she recalls tolerating it. Pt denies change in bowel or bladder habits. Followed by rheumatologist for this (Dr. Nati Carter) and per patient, she was told she had psoriatic arthritis she is on [facility] for this. Operative note from Dr. Starr Perdomo dated 3/16/17 indicating patient was seen with c/o disc replacement at C3/4. Note from Dr. Starr Perdomo dated 4/28/17 indicating patient was 6/10 disc arthroplasty. She was doing well, her arm/neck pain had resolved and she was asymptomatic. Note from Luke Ahumada, NP dated 6/28/17 indicating patient was seen and reported she was doing great and feeling much better.  Note from Anthony Barton NP dated 1/21/19 indicating patient was seen with c/o thoracic spinal pain. Referred to us. Lumbar spine MRI dated 12/29/18 reviewed. Per report, indeterminate left adnexal lesion, roughly measuring 4cm. This is incompletely characterized. Recommended dedicated pelvic ultrasound. (Per patient, she was evaluated for this previously by GYN). Mild degenerative changes. No evidence of high-grade canal or neural foraminal stenosis. Thoracic spine MRI dated 1/16/19 reviewed. Per report, T6-7 shallow disc protrusion. No stenosis. T7-8 broad posterior disc protrusion. Mild effacement ventral cord. Circumferential CSF. No stenosis. T9-10 miniscule disc protrusion. No stenosis.  reviewed. Body mass index is 45.46 kg/m². PCP: Shanice Painting MD      Past Medical History:   Diagnosis Date    Arthritis     spinal stenosis        Social History     Socioeconomic History    Marital status: UNKNOWN     Spouse name: Not on file    Number of children: Not on file    Years of education: Not on file    Highest education level: Not on file   Social Needs    Financial resource strain: Not on file    Food insecurity - worry: Not on file    Food insecurity - inability: Not on file    Transportation needs - medical: Not on file   [employer] needs - non-medical: Not on file   Occupational History    Not on file   Tobacco Use    Smoking status: Never Smoker    Smokeless tobacco: Never Used   Substance and Sexual Activity    Alcohol use: No    Drug use: No    Sexual activity: Not on file   Other Topics Concern    Not on file   Social History Narrative    Not on file       Current Outpatient Medications   Medication Sig Dispense Refill    acyclovir (ZOVIRAX) 400 mg tablet Take one tid for 5 days prn cold sores      meclizine (ANTIVERT) 25 mg tablet 25 mg.      ondansetron hcl (ZOFRAN) 8 mg tablet 8 mg.  promethazine (PHENERGAN) 25 mg suppository 25 mg.      magnesium oxide (GALLEGO) 500 mg tab Take 2 Caps by mouth daily.  For constipation      diazePAM (VALIUM) 5 mg tablet Take 5 mg by mouth nightly as needed for Anxiety or Sleep. To relax muscle tension. Has not needed for a while, per patient repsot. 1    naproxen (NAPROSYN) 500 mg tablet Take 500 mg by mouth two (2) times daily (with meals). PRN. For pain and inflammation. Not taking at this time after surgery. 2    triamcinolone (NASACORT AQ) 55 mcg nasal inhaler 1 [place] by Both Nostrils route as needed (Seasonal Allergies).  triamcinolone acetonide (KENALOG) 0.1 % topical cream Apply 1 Each to affected area two (2) times daily as needed for Other (Psoriasis). 1    traMADol (ULTRAM) 50 mg tablet Take 1 Tab by mouth every six (6) hours as needed. Max Daily Amount: 200 mg. Indications: Pain 40 Tab 0    tiZANidine (ZANAFLEX) 6 mg capsule Take 1 Cap by mouth three (3) times daily as needed. Indications: MUSCLE SPASM 90 Cap 0    traMADol (ULTRAM) 50 mg tablet TK 1 T PO  Q 6 H PRN  0    tiZANidine (ZANAFLEX) 6 mg capsule TK 1 C PO TID PRN  1    gabapentin (NEURONTIN) 300 mg capsule Take 1 Cap by mouth three (3) times daily (with meals). Indications: NEUROPATHIC PAIN 90 Cap 1       Allergies   Allergen Reactions    Fentanyl Nausea and Vomiting    Iodine Hives    Oxycodone Other (comments)    Penicillin G Other (comments)          PHYSICAL EXAMINATION    Visit Vitals  /79   Pulse 84   Temp 99 °F (37.2 °C)   Resp 18   Ht 5' 1\" (1.549 m)   Wt 240 lb 9.6 oz (109.1 kg)   SpO2 99%   BMI 45.46 kg/m²       CONSTITUTIONAL: NAD, A&O x 3  SENSATION: Intact to light touch throughout  NEURO: Gasper's is negative bilaterally. RANGE OF MOTION: The patient has full passive range of motion in all four extremities.   MOTOR:  Straight Leg Raise: Negative, bilateral   Tandem gait: LOB     Shoulder AB/Flex Elbow Flex Wrist Ext Elbow Ext Wrist Flex Hand Intrin Tone   Right +4/5 +4/5 +4/5 +4/5 +4/5 +4/5 +4/5   Left +4/5 +4/5 +4/5 +4/5 +4/5 +4/5 +4/5              Hip Flex Knee Ext Knee Flex Ankle DF GTE Ankle PF Tone   Right +4/5 +4/5 +4/5 +4/5 +4/5 +4/5 +4/5   Left +4/5 +4/5 +4/5 +4/5 +4/5 +4/5 +4/5     RADIOGRAPHS  Preliminary reading of cervical plain films:  Disc arthroplasty at C3/4, well positioned. Relatively mild degenerative changes at C6 and C7. Small anterior osteophyte noted at C5. Nonspecific straightening of the cervical spine. No acute pathology identified. These are being sent out for official reading by Dr. Lonnie Hopper. ASSESSMENT   Diagnoses and all orders for this visit:    1. Neck pain  -     AMB POC XRAY, SPINE, CERVICAL; 2 OR 3  -     MRI CERV SPINE WO CONT; Future    2. HNP (herniated nucleus pulposus with myelopathy), thoracic  -     MRI CERV SPINE WO CONT; Future    3. Lumbar pain  -     MRI CERV SPINE WO CONT; Future    4. Cervical neuritis  -     MRI CERV SPINE WO CONT; Future    5. Cervical post-laminectomy syndrome  -     MRI CERV SPINE WO CONT; Future    6. Psoriatic arthritis ([address]  -     MRI CERV SPINE WO CONT; Future          IMPRESSION AND PLAN:  The patient returns today with c/o progressive upper back pain near the left shoulder, radiating into the chest/abdomen with paraesthesias in the BUE to the digits x 3 months. I will order a cervical spine MRI. I advised patient to bring copies of films to next visit. I offered to restart her on Neurontin, but she declined at this time. Patient is neurologically intact. I will see the patient back following MRI. Written by Jada Carballo, as dictated by Poli Lee MD  I examined the patient, reviewed and agree with the note.

## 2019-01-29 ENCOUNTER — DOCUMENTATION ONLY (OUTPATIENT)
Dept: ORTHOPEDIC SURGERY | Age: 49
End: 2019-01-29

## 2019-01-29 NOTE — PROGRESS NOTES
MRI of the Cervical Spine without contrast is scheduled at [facility] [address], [phone], on 02/04/19, arrive 6:30PM, test 6:45PM. [employer] pre-authorization [id number], effective 01/29/19-02/27/19

## 2019-02-18 ENCOUNTER — OFFICE VISIT (OUTPATIENT)
Dept: ORTHOPEDIC SURGERY | Age: 49
End: 2019-02-18

## 2019-02-18 VITALS
OXYGEN SATURATION: 99 % | HEIGHT: 61 IN | SYSTOLIC BLOOD PRESSURE: 129 MMHG | HEART RATE: 77 BPM | TEMPERATURE: 99.4 F | DIASTOLIC BLOOD PRESSURE: 99 MMHG | WEIGHT: 245.4 LBS | RESPIRATION RATE: 18 BRPM | BODY MASS INDEX: 46.33 KG/M2

## 2019-02-18 DIAGNOSIS — M54.12 CERVICAL RADICULOPATHY: ICD-10-CM

## 2019-02-18 DIAGNOSIS — M96.1 CERVICAL POST-LAMINECTOMY SYNDROME: ICD-10-CM

## 2019-02-18 DIAGNOSIS — L40.50 PSORIATIC ARTHRITIS (HCC): Primary | ICD-10-CM

## 2019-02-18 NOTE — PROGRESS NOTES
[facility]  [address] [place] [address]   Phone: [phone]  Fax: [phone]        PROGRESS NOTE      HISTORY OF PRESENT ILLNESS:  The patient is a 53 y.o. female and was seen today for follow up of progressive upper back pain near the left shoulder, radiating into the chest/abdomen with paraesthesias in the BUE to all digits of the hands x 3 months. She endorses a recurrence of weakness in the LUE. She really has widespread pain complaints with a migratory pain pattern. She denies specific injury or trauma. Pt denies falls or LOB. Pain is exacerbated with sitting. BUE symptoms are not worsened positionally. She denies a hx of thoracic/lumbar spinal surgery, injections, or PT. She denies recent PT. Pt has treated sxs with Tramadol and muscle relaxers prn. She discontinued Neurontin due to surgery, but she recalls tolerating it. Pt denies change in bowel or bladder habits. Followed by rheumatologist for this (Dr. Jos Espinoza) and per patient, she was told she had psoriatic arthritis she is on [facility] for this. Initially, she was seen for progressive neck pain into the BUE (L>R) into the shoulders since October 2016. Patient can replicate her symptoms with cervical extension. She rates pain 4/10. Patient notes initial onset of symptoms roughly 9 years ago which has been intermittent since. No specific injury. She has flare ups every couple of months that are more frequent now. She denies hx of cervical spinal surgery or injections. Patient reports of \"temporary paralysis. \" Note from Destiny Sharma NP 1/3/17 indicating patient was seen for neck pain. Of note, patient noted improvement with medications Tramadol. Patient was also started on Prednisone and prescribed Zanaflex without relief. Referral note from Emery Joy PA-C dated 1/4/17 indicating patient has been referred to me to evaluate neck pain extending into the UE.  ER note dated 1/8/17 indicating patient presented with neck pain into the BUE. Physical therapy notes were reviewed. She completes physical therapy without relief. Patient continues her HEP daily. She is taking Neurontin 100 mg BID which she tolerates without relief. She denies possibility of pregnancy or breastfeeding due to hx of tubal ligation and hysterectomy. Patient denies change in bowel or bladder habits. Patient denies fever, weight loss, or skin changes. She denies hx of DM. Cervical spine XR dated 12/15/16 per report revealed no significant abnormalities. Cervical spine MRI dated 1/19/17 reviewed. Per report, severe central canal stenosis and associated spinal cord compression at C3-C4 due to large disc protrusion. Elsewhere, no central canal stenosis. Degenerative changes. Subtle signal changes involving the cervical spinal cord at C3-C4. Operative note from Dr. Deo Cruz dated 3/16/17 indicating patient was seen with c/o disc replacement at C3/4. Note from Dr. Deo Cruz dated 4/28/17 indicating patient was 6/10 disc arthroplasty. She was doing well, her arm/neck pain had resolved and she was asymptomatic. Note from Bobbi Aguirre NP dated 6/28/17 indicating patient was seen and reported she was doing great and feeling much better. Note from Destiny Sharma NP dated 1/21/19 indicating patient was seen with c/o thoracic spinal pain. Referred to us. Lumbar spine MRI dated 12/29/18 reviewed. Per report, indeterminate left adnexal lesion, roughly measuring 4cm. This is incompletely characterized. Recommended dedicated pelvic ultrasound. (Per patient, she was evaluated for this previously by GYN). Mild degenerative changes. No evidence of high-grade canal or neural foraminal stenosis. Thoracic spine MRI dated 1/16/19 reviewed. Per report, T6-7 shallow disc protrusion. No stenosis. T7-8 broad posterior disc protrusion. Mild effacement ventral cord. Circumferential CSF. No stenosis. T9-10 miniscule disc protrusion. No stenosis.  The patient is RH dominant. At her last clinical appointment the patient returned with c/o progressive upper back pain near the left shoulder, radiating into the chest/abdomen with paraesthesias in the BUE to the digits x 3 months. I ordered a cervical spine MRI. I advised patient to bring copies of films to next visit. I offered to restart her on Neurontin, but she declined at that time. The patient returns today with pain location and distribution remain unchanged. She continues to rate her pain 4-7/10. She additionally endorses neck pain at this time. Pt denies dropping things or loss of balance. Cervical spine MRI dated 2/4/19 reviewed. Per report,C3-4 focal susceptibility artifact completely obscures spinal canal and neural foramina and cannot be evaluated. Consider CT, optimally, cervical myelogram for further evaluation. Elsewhere, disc bulges with no high grade stenosis. Partial empty sella configuration.  reviewed. Body mass index is 45.46 kg/m².       PCP: Jade Beltrán MD      Past Medical History:   Diagnosis Date    Arthritis     spinal stenosis    Fibroids         Social History     Socioeconomic History    Marital status: UNKNOWN     Spouse name: Not on file    Number of children: Not on file    Years of education: Not on file    Highest education level: Not on file   Social Needs    Financial resource strain: Not on file    Food insecurity - worry: Not on file    Food insecurity - inability: Not on file    Transportation needs - medical: Not on file   [employer] needs - non-medical: Not on file   Occupational History    Not on file   Tobacco Use    Smoking status: Never Smoker    Smokeless tobacco: Never Used   Substance and Sexual Activity    Alcohol use: No    Drug use: No    Sexual activity: Not on file   Other Topics Concern     Service Not Asked    Blood Transfusions Not Asked    Caffeine Concern Not Asked    Occupational Exposure Not Asked   [facility] Hazards Not Asked    Sleep Concern Not Asked    Stress Concern Not Asked    Weight Concern Not Asked    Special Diet Not Asked    Back Care Not Asked    Exercise Not Asked    Bike Helmet Not Asked   [address] Not Asked    Self-Exams Not Asked   Social History Narrative    Not on file       Current Outpatient Medications   Medication Sig Dispense Refill    hydroCHLOROthiazide (HYDRODIURIL) 25 mg tablet Take 25 mg by mouth daily.  cholecalciferol, vitamin D3, (VITAMIN D3) 2,000 unit tab Take 4,000 Units by mouth daily.  cyanocobalamin 1,000 mcg tablet Take 1,000 mcg by mouth daily.  apremilast 30 mg tab Take 30 mg by mouth two (2) times a day.  acyclovir (ZOVIRAX) 400 mg tablet Take one tid for 5 days prn cold sores      meclizine (ANTIVERT) 25 mg tablet 25 mg.      ondansetron hcl (ZOFRAN) 8 mg tablet 8 mg.  promethazine (PHENERGAN) 25 mg suppository 25 mg.      magnesium oxide (GALLEGO) 500 mg tab Take 2 Caps by mouth daily. For constipation      diazePAM (VALIUM) 5 mg tablet Take 5 mg by mouth nightly as needed for Anxiety or Sleep. To relax muscle tension. Has not needed for a while, per patient repsot. 1    naproxen (NAPROSYN) 500 mg tablet Take 500 mg by mouth two (2) times daily (with meals). PRN. For pain and inflammation. Not taking at this time after surgery. 2    triamcinolone (NASACORT AQ) 55 mcg nasal inhaler 1 [place] by Both Nostrils route as needed (Seasonal Allergies).  triamcinolone acetonide (KENALOG) 0.1 % topical cream Apply 1 Each to affected area two (2) times daily as needed for Other (Psoriasis). 1    traMADol (ULTRAM) 50 mg tablet Take 1 Tab by mouth every six (6) hours as needed. Max Daily Amount: 200 mg. Indications: Pain 40 Tab 0    tiZANidine (ZANAFLEX) 6 mg capsule Take 1 Cap by mouth three (3) times daily as needed. Indications:  MUSCLE SPASM 90 Cap 0    traMADol (ULTRAM) 50 mg tablet TK 1 T PO  Q 6 H PRN  0    tiZANidine (ZANAFLEX) 6 mg capsule TK 1 C PO TID PRN  1    gabapentin (NEURONTIN) 300 mg capsule Take 1 Cap by mouth three (3) times daily (with meals). Indications: NEUROPATHIC PAIN (Patient not taking: Reported on 1/28/2019) 90 Cap 1       Allergies   Allergen Reactions    Fentanyl Nausea and Vomiting    Iodine Hives    Oxycodone Other (comments)    Penicillin G Other (comments)          PHYSICAL EXAMINATION    Visit Vitals  Ht 5' 1\" (1.549 m)   BMI 45.46 kg/m²       CONSTITUTIONAL: NAD, A&O x 3  SENSATION: Intact to light touch throughout  Neuro: Crawley's negative bilaterally. RANGE OF MOTION: The patient has full passive range of motion in all four extremities. Shoulder AB/Flex Elbow Flex Wrist Ext Elbow Ext Wrist Flex Hand Intrin Tone   Right +4/5 +4/5 +4/5 +4/5 +4/5 +4/5 +4/5   Left +4/5 +4/5 +4/5 +4/5 +4/5 +4/5 +4/5                   ASSESSMENT   Diagnoses and all orders for this visit:    1. Psoriatic arthritis ([address]    2. Cervical radiculopathy    3. Cervical post-laminectomy syndrome          IMPRESSION AND PLAN:  I will set her up for a CT myelogram of the cervical spine. I offered to try her on Neurontin, but she declined. Patient is neurologically intact. I will see the patient back following CT myelogram.      Written by [place], as dictated by Rene Ferro MD  I examined the patient, reviewed and agree with the note.

## 2019-02-22 ENCOUNTER — TELEPHONE (OUTPATIENT)
Dept: ORTHOPEDIC SURGERY | Age: 49
End: 2019-02-22

## 2019-02-22 NOTE — TELEPHONE ENCOUNTER
Our request for a Myelogram/ CT of the Lumbar Spine has been placed into a peer to peer review. I tried to contact [employer] regarding MRI results and they would not accept my information. A peer to peer can be completed before the close of business today, [phone], Member# [id number]. If approved, please obtain the authorization number and effective dates. Thank you so very much.

## 2019-02-25 NOTE — TELEPHONE ENCOUNTER
Called and completed peer to peer. For CT Myelogram CS. It has been approved but it will take 24 hours to get approval #.  They will either call us or fax us in the next day or two with approval #

## 2019-03-01 DIAGNOSIS — M54.2 NECK PAIN: ICD-10-CM

## 2019-03-01 DIAGNOSIS — M96.1 CERVICAL POST-LAMINECTOMY SYNDROME: ICD-10-CM

## 2019-03-01 DIAGNOSIS — M51.04 HNP (HERNIATED NUCLEUS PULPOSUS WITH MYELOPATHY), THORACIC: ICD-10-CM

## 2019-03-01 DIAGNOSIS — M54.50 LUMBAR PAIN: ICD-10-CM

## 2019-03-01 DIAGNOSIS — L40.50 PSORIATIC ARTHRITIS (HCC): ICD-10-CM

## 2019-03-01 DIAGNOSIS — M54.12 CERVICAL NEURITIS: ICD-10-CM

## 2019-03-12 ENCOUNTER — TELEPHONE (OUTPATIENT)
Dept: ORTHOPEDIC SURGERY | Age: 49
End: 2019-03-12

## 2019-03-12 NOTE — TELEPHONE ENCOUNTER
I have spoken with Susan Morales at [address] [place] [address] regarding these orders. I will have Dr. Mj Kapoor put an actual signature on the orders and fax them to her tomorrow morning. Susan Morales states this is fine. Thank you.

## 2019-04-01 ENCOUNTER — OFFICE VISIT (OUTPATIENT)
Dept: ORTHOPEDIC SURGERY | Age: 49
End: 2019-04-01

## 2019-04-01 NOTE — PROGRESS NOTES
A user error has taken place:patient rescheduled due to not having her disk for her CT Myelogram done at [facility].

## 2019-04-01 NOTE — PROGRESS NOTES
[facility] 
[address] [facility] [address], [address]  Phone: [phone] Fax: [phone] PROGRESS NOTE HISTORY OF PRESENT ILLNESS: 
The patient is a 53 y.o. female and was seen today for follow up of progressive upper back pain near the left shoulder, radiating into the chest/abdomen with paraesthesias in the BUE to all digits of the hands x 3 months. She endorses a recurrence of weakness in the LUE. She really has widespread pain complaints with a migratory pain pattern. She denies specific injury or trauma. Pt denies falls or LOB. Pain is exacerbated with sitting. BUE symptoms are not worsened positionally. She denies a hx of thoracic/lumbar spinal surgery, injections, or PT. She denies recent PT. Pt has treated sxs with Tramadol and muscle relaxers prn. She discontinued Neurontin due to surgery, but she recalls tolerating it. Pt denies change in bowel or bladder habits. Followed by rheumatologist for this (Dr. Madai Carver) and per patient, she was told she had psoriatic arthritis she is on [facility] for this. Initially, she was seen for progressive neck pain into the BUE (L>R) into the shoulders since October 2016. Patient can replicate her symptoms with cervical extension. She rates pain 4/10. Patient notes initial onset of symptoms roughly 9 years ago which has been intermittent since. No specific injury. She has flare ups every couple of months that are more frequent now. She denies hx of cervical spinal surgery or injections. Patient reports of \"temporary paralysis. \" Note from Carlos Peralta NP 1/3/17 indicating patient was seen for neck pain. Of note, patient noted improvement with medications Tramadol. Patient was also started on Prednisone and prescribed Zanaflex without relief.  Referral note from Desire Carpenter PA-C dated 1/4/17 indicating patient has been referred to me to evaluate neck pain extending into the UE. ER note dated 1/8/17 indicating patient presented with neck pain into the BUE. Physical therapy notes were reviewed. She completes physical therapy without relief. Patient continues her HEP daily. She is taking Neurontin 100 mg BID which she tolerates without relief. She denies possibility of pregnancy or breastfeeding due to hx of tubal ligation and hysterectomy. Patient denies change in bowel or bladder habits. Patient denies fever, weight loss, or skin changes. She denies hx of DM. Cervical spine XR dated 12/15/16 per report revealed no significant abnormalities. Cervical spine MRI dated 1/19/17 reviewed. Per report, severe central canal stenosis and associated spinal cord compression at C3-C4 due to large disc protrusion. Elsewhere, no central canal stenosis. Degenerative changes. Subtle signal changes involving the cervical spinal cord at C3-C4. Operative note from Dr. Frankie Stewart dated 3/16/17 indicating patient was seen with c/o disc replacement at C3/4. Note from Dr. Frankie Stewart dated 4/28/17 indicating patient was 6/10 disc arthroplasty. She was doing well, her arm/neck pain had resolved and she was asymptomatic. Note from Juhi Torre NP dated 6/28/17 indicating patient was seen and reported she was doing great and feeling much better. Note from Babar Cannon NP dated 1/21/19 indicating patient was seen with c/o thoracic spinal pain. Referred to us. Lumbar spine MRI dated 12/29/18 reviewed. Per report, indeterminate left adnexal lesion, roughly measuring 4cm. This is incompletely characterized. Recommended dedicated pelvic ultrasound. (Per patient, she was evaluated for this previously by GYN). Mild degenerative changes. No evidence of high-grade canal or neural foraminal stenosis. Thoracic spine MRI dated 1/16/19 reviewed. Per report, T6-7 shallow disc protrusion. No stenosis. T7-8 broad posterior disc protrusion. Mild effacement ventral cord. Circumferential CSF. No stenosis.  T9-10 miniscule disc protrusion. No stenosis. Cervical spine MRI dated 2/4/19 reviewed. Per report,C3-4 focal susceptibility artifact completely obscures spinal canal and neural foramina and cannot be evaluated. Consider CT, optimally, cervical myelogram for further evaluation. Elsewhere, disc bulges with no high grade stenosis. Partial empty sella configuration. The patient is RH dominant. At her last clinical appointment, I set her up for a CT myelogram of the cervical spine. I offered to try her on Neurontin, but she declined. She continues to rate her pain 4-7/10. She additionally endorses neck pain at this time. Pt denies dropping things or loss of balance. The patient returns today ***. she rates her pain ***.  reviewed. There is no height or weight on file to calculate BMI. PCP: Kirsten Mendoza MD 
 
 
Past Medical History:  
Diagnosis Date  Arthritis   
 spinal stenosis  Fibroids Social History Socioeconomic History  Marital status: UNKNOWN Spouse name: Not on file  Number of children: Not on file  Years of education: Not on file  Highest education level: Not on file Occupational History  Not on file Social Needs  Financial resource strain: Not on file  Food insecurity:  
  Worry: Not on file Inability: Not on file  Transportation needs:  
  Medical: Not on file Non-medical: Not on file Tobacco Use  Smoking status: Never Smoker  Smokeless tobacco: Never Used Substance and Sexual Activity  Alcohol use: No  
 Drug use: No  
 Sexual activity: Not on file Lifestyle  Physical activity:  
  Days per week: Not on file Minutes per session: Not on file  Stress: Not on file Relationships  Social connections:  
  Talks on phone: Not on file Gets together: Not on file Attends Islam service: Not on file Active member of club or organization: Not on file Attends meetings of clubs or organizations: Not on file Relationship status: Not on file  Intimate partner violence:  
  Fear of current or ex partner: Not on file Emotionally abused: Not on file Physically abused: Not on file Forced sexual activity: Not on file Other Topics Concern [address] Service Not Asked  Blood Transfusions Not Asked  Caffeine Concern Not Asked  Occupational Exposure Not Asked Laren [employer]ans Hazards Not Asked  Sleep Concern Not Asked  Stress Concern Not Asked  Weight Concern Not Asked  Special Diet Not Asked  Back Care Not Asked  Exercise Not Asked  Bike Helmet Not Asked  Seat Belt Not Asked  Self-Exams Not Asked Social History Narrative  Not on file Current Outpatient Medications Medication Sig Dispense Refill  hydroCHLOROthiazide (HYDRODIURIL) 25 mg tablet Take 25 mg by mouth daily.  cholecalciferol, vitamin D3, (VITAMIN D3) 2,000 unit tab Take 4,000 Units by mouth daily.  cyanocobalamin 1,000 mcg tablet Take 1,000 mcg by mouth daily.  apremilast 30 mg tab Take 30 mg by mouth two (2) times a day.  acyclovir (ZOVIRAX) 400 mg tablet Take one tid for 5 days prn cold sores  meclizine (ANTIVERT) 25 mg tablet 25 mg.    
 ondansetron hcl (ZOFRAN) 8 mg tablet 8 mg.  promethazine (PHENERGAN) 25 mg suppository 25 mg.    
 magnesium oxide (GALLEGO) 500 mg tab Take 2 Caps by mouth daily. For constipation  diazePAM (VALIUM) 5 mg tablet Take 5 mg by mouth nightly as needed for Anxiety or Sleep. To relax muscle tension. Has not needed for a while, per patient repsot. 1  
 naproxen (NAPROSYN) 500 mg tablet Take 500 mg by mouth two (2) times daily (with meals). PRN. For pain and inflammation. Not taking at this time after surgery. 2  
 triamcinolone (NASACORT AQ) 55 mcg nasal inhaler 1 [place] by Both Nostrils route as needed (Seasonal Allergies).  triamcinolone acetonide (KENALOG) 0.1 % topical cream Apply 1 Each to affected area two (2) times daily as needed for Other (Psoriasis). 1  
 traMADol (ULTRAM) 50 mg tablet Take 1 Tab by mouth every six (6) hours as needed. Max Daily Amount: 200 mg. Indications: Pain 40 Tab 0  
 tiZANidine (ZANAFLEX) 6 mg capsule Take 1 Cap by mouth three (3) times daily as needed. Indications: MUSCLE SPASM 90 Cap 0  
 traMADol (ULTRAM) 50 mg tablet TK 1 T PO  Q 6 H PRN  0  
 tiZANidine (ZANAFLEX) 6 mg capsule TK 1 C PO TID PRN  1  
 gabapentin (NEURONTIN) 300 mg capsule Take 1 Cap by mouth three (3) times daily (with meals). Indications: NEUROPATHIC PAIN (Patient not taking: Reported on 1/28/2019) 90 Cap 1 Allergies Allergen Reactions  Fentanyl Nausea and Vomiting  Iodine Hives  Oxycodone Other (comments)  Penicillin G Other (comments) PHYSICAL EXAMINATION There were no vitals taken for this visit. CONSTITUTIONAL: NAD, A&O x 3 SENSATION: Intact to light touch throughout NEURO: Gasper's is negative bilaterally. RANGE OF MOTION: The patient has full passive range of motion in all four extremities. MOTOR: 
Straight Leg Raise: {Pos/neg/not test:903462::\"Negative\"}, {right/left:34629} *** Shoulder AB/Flex Elbow Flex Wrist Ext Elbow Ext Wrist Flex Hand Intrin Tone Right +4/5 +4/5 +4/5 +4/5 +4/5 +4/5 +4/5 Left +4/5 +4/5 +4/5 +4/5 +4/5 +4/5 +4/5  
         
*** Hip Flex Knee Ext Knee Flex Ankle DF GTE Ankle PF Tone Right +4/5 +4/5 +4/5 +4/5 +4/5 +4/5 +4/5 Left +4/5 +4/5 +4/5 +4/5 +4/5 +4/5 +4/5 ASSESSMENT  
{There are no diagnoses linked to this encounter. (Refresh or delete this SmartLink)} IMPRESSION AND PLAN: 
*** I will see the patient back in *** month's time or earlier if needed. Written by osmar Romo dictated by Claudia Landis MD 
I examined the patient, reviewed and agree with the note.

## 2019-04-08 ENCOUNTER — DOCUMENTATION ONLY (OUTPATIENT)
Dept: ORTHOPEDIC SURGERY | Age: 49
End: 2019-04-08

## 2019-04-08 ENCOUNTER — OFFICE VISIT (OUTPATIENT)
Dept: ORTHOPEDIC SURGERY | Age: 49
End: 2019-04-08

## 2019-04-08 VITALS
HEART RATE: 72 BPM | HEIGHT: 61 IN | DIASTOLIC BLOOD PRESSURE: 86 MMHG | RESPIRATION RATE: 24 BRPM | TEMPERATURE: 98.3 F | WEIGHT: 247 LBS | OXYGEN SATURATION: 98 % | BODY MASS INDEX: 46.63 KG/M2 | SYSTOLIC BLOOD PRESSURE: 127 MMHG

## 2019-04-08 DIAGNOSIS — L40.50 PSORIATIC ARTHRITIS (HCC): ICD-10-CM

## 2019-04-08 DIAGNOSIS — M54.16 LUMBAR NEURITIS: ICD-10-CM

## 2019-04-08 DIAGNOSIS — M54.12 CERVICAL RADICULOPATHY: ICD-10-CM

## 2019-04-08 DIAGNOSIS — M54.12 CERVICAL NEURITIS: ICD-10-CM

## 2019-04-08 DIAGNOSIS — M96.1 LUMBAR POST-LAMINECTOMY SYNDROME: ICD-10-CM

## 2019-04-08 DIAGNOSIS — M96.1 CERVICAL POSTLAMINECTOMY SYNDROME: ICD-10-CM

## 2019-04-08 DIAGNOSIS — M54.2 NECK PAIN: Primary | ICD-10-CM

## 2019-04-08 RX ORDER — OMEPRAZOLE 40 MG/1
40 CAPSULE, DELAYED RELEASE ORAL
COMMUNITY
Start: 2018-08-28 | End: 2019-05-06 | Stop reason: ALTCHOICE

## 2019-04-08 RX ORDER — CLINDAMYCIN AND BENZOYL PEROXIDE 10; 50 MG/G; MG/G
GEL TOPICAL
COMMUNITY
Start: 2018-12-07

## 2019-04-08 RX ORDER — GABAPENTIN 300 MG/1
300 CAPSULE ORAL 3 TIMES DAILY
Qty: 90 CAP | Refills: 1 | Status: SHIPPED | OUTPATIENT
Start: 2019-04-08 | End: 2019-04-10 | Stop reason: SDUPTHER

## 2019-04-08 RX ORDER — BENZONATATE 200 MG/1
200 CAPSULE ORAL
COMMUNITY
Start: 2019-01-11 | End: 2019-10-24

## 2019-04-08 NOTE — PROGRESS NOTES
[facility]  [address], [place]   Phone: [phone]  Fax: [phone]        PROGRESS NOTE      HISTORY OF PRESENT ILLNESS:  The patient is a 53 y.o. female and was seen today for follow up of progressive upper back pain near the left shoulder, radiating into the chest/abdomen with paraesthesias in the BUE to all digits of the hands x 3 months. She endorses neck pain. She really has widespread pain complaints with a migratory pain pattern. She denies specific injury or trauma. Pt denies falls or LOB. Pain is exacerbated with sitting. BUE symptoms are not worsened positionally. She denies a hx of thoracic/lumbar spinal surgery, injections. Pt has treated sxs with Tramadol and muscle relaxers prn. She discontinued Neurontin due to surgery, but she recalls tolerating it. Pt denies change in bowel or bladder habits. PmHx cervical spinal fusion (2016). Followed by rheumatologist for this (Dr. Sonny Clark) and per patient, she was told she had psoriatic arthritis she is on [facility] for this. Initially, she was seen for progressive neck pain into the BUE (L>R) into the shoulders since October 2016. Patient can replicate her symptoms with cervical extension. Patient notes initial onset of symptoms roughly 9 years ago which has been intermittent since. No specific injury. She has flare ups every couple of months that are more frequent now. She denies hx of cervical spinal injections. Patient reports of \"temporary paralysis. \" Note from Arlen Almazan NP 1/3/17 indicating patient was seen for neck pain. Of note, patient noted improvement with medications Tramadol. Patient was also started on Prednisone and prescribed Zanaflex without relief. Referral note from Bobby Toro PA-C dated 1/4/17 indicating patient has been referred to me to evaluate neck pain extending into the UE. ER note dated 1/8/17 indicating patient presented with neck pain into the BUE. Physical therapy notes were reviewed. She completes physical therapy without relief. Patient continues her HEP daily. She is taking Neurontin 100 mg BID which she tolerates without relief. She denies possibility of pregnancy or breastfeeding due to hx of tubal ligation and hysterectomy. Patient denies change in bowel or bladder habits. Patient denies fever, weight loss, or skin changes. She denies hx of DM. Cervical spine XR dated 12/15/16 per report revealed no significant abnormalities. Operative note from Dr. Dixon Luis dated 3/16/17 indicating patient was seen with c/o disc replacement at C3/4. Note from Dr. Dixon Luis dated 4/28/17 indicating patient was 6/10 disc arthroplasty. She was doing well, her arm/neck pain had resolved and she was asymptomatic. Note from Arielle Cameron NP dated 6/28/17 indicating patient was seen and reported she was doing great and feeling much better. Note from Arnoldo Kraft NP dated 1/21/19 indicating patient was seen with c/o thoracic spinal pain. Referred to us. Lumbar spine MRI dated 12/29/18 reviewed. Per report, indeterminate left adnexal lesion, roughly measuring 4cm. This is incompletely characterized. Recommended dedicated pelvic ultrasound. (Per patient, she was evaluated for this previously by GYN). Mild degenerative changes. No evidence of high-grade canal or neural foraminal stenosis. Thoracic spine MRI dated 1/16/19 reviewed. Per report, T6-7 shallow disc protrusion. No stenosis. T7-8 broad posterior disc protrusion. Mild effacement ventral cord. Circumferential CSF. No stenosis. T9-10 miniscule disc protrusion. No stenosis. Cervical spine MRI dated 2/4/19 reviewed. Per report,C3-4 focal susceptibility artifact completely obscures spinal canal and neural foramina and cannot be evaluated. Consider CT, optimally, cervical myelogram for further evaluation. Elsewhere, disc bulges with no high grade stenosis. Partial empty sella configuration. The patient is RH dominant.  At her last clinical appointment, I set her up for a CT myelogram of the cervical spine. I offered to try her on Neurontin, but she declined. The patient returns today with pain location and distribution remain unchanged. She rates her pain 4/10, previously 4-7/10. Pt denies dropping things or loss of balance. Pt denies change in bowel or bladder habits. Cervical spine CT myelogram dated 3/13/19 reviewed. Per report, interbody fusion of C3-4. Grade 1 anterolisthesis at C3-4. Midline osteophyte slightly effacing the cord at the C3-4 level. Ridging at C4-5 narrowing the anterior epidural space. Cervical spine XR myelogram dated 3/13/19 reviewed. Per report, interbody fusion at C3-C4. Mild truncation of the nerve roots at C3-C4 bilaterally.  reviewed. Body mass index is 46.67 kg/m².       PCP: Jared Calvin MD      Past Medical History:   Diagnosis Date    Arthritis     spinal stenosis    Fibroids         Social History     Socioeconomic History    Marital status: UNKNOWN     Spouse name: Not on file    Number of children: Not on file    Years of education: Not on file    Highest education level: Not on file   Occupational History    Not on file   Social Needs    Financial resource strain: Not on file    Food insecurity:     Worry: Not on file     Inability: Not on file    Transportation needs:     Medical: Not on file     Non-medical: Not on file   Tobacco Use    Smoking status: Never Smoker    Smokeless tobacco: Never Used   Substance and Sexual Activity    Alcohol use: No    Drug use: No    Sexual activity: Not on file   Lifestyle    Physical activity:     Days per week: Not on file     Minutes per session: Not on file    Stress: Not on file   Relationships    Social connections:     Talks on phone: Not on file     Gets together: Not on file     Attends Jewish service: Not on file     Active member of club or organization: Not on file     Attends meetings of clubs or organizations: Not on file     Relationship status: Not on file    Intimate partner violence:     Fear of current or ex partner: Not on file     Emotionally abused: Not on file     Physically abused: Not on file     Forced sexual activity: Not on file   Other Topics Concern     Service Not Asked    Blood Transfusions Not Asked    Caffeine Concern Not Asked    Occupational Exposure Not Asked   Helyn Lynnette Hazards Not Asked    Sleep Concern Not Asked    Stress Concern Not Asked    Weight Concern Not Asked    Special Diet Not Asked    Back Care Not Asked    Exercise Not Asked    Bike Helmet Not Asked   [address] Not Asked    Self-Exams Not Asked   Social History Narrative    Not on file       Current Outpatient Medications   Medication Sig Dispense Refill    benzonatate (TESSALON) 200 mg capsule Take 200 mg by mouth.  clindamycin-benzoyl peroxide (BENZACLIN) 1-5 % topical gel Apply  to affected area.  omeprazole (PRILOSEC) 40 mg capsule Take 40 mg by mouth.  hydroCHLOROthiazide (HYDRODIURIL) 25 mg tablet Take 25 mg by mouth daily.  cholecalciferol, vitamin D3, (VITAMIN D3) 2,000 unit tab Take 4,000 Units by mouth daily.  cyanocobalamin 1,000 mcg tablet Take 1,000 mcg by mouth daily.  apremilast 30 mg tab Take 30 mg by mouth two (2) times a day.  acyclovir (ZOVIRAX) 400 mg tablet Take one tid for 5 days prn cold sores      meclizine (ANTIVERT) 25 mg tablet 25 mg.      triamcinolone (NASACORT AQ) 55 mcg nasal inhaler 1 [place] by Both Nostrils route as needed (Seasonal Allergies).  triamcinolone acetonide (KENALOG) 0.1 % topical cream Apply 1 Each to affected area two (2) times daily as needed for Other (Psoriasis). 1    traMADol (ULTRAM) 50 mg tablet Take 1 Tab by mouth every six (6) hours as needed. Max Daily Amount: 200 mg. Indications: Pain 40 Tab 0    tiZANidine (ZANAFLEX) 6 mg capsule Take 1 Cap by mouth three (3) times daily as needed.  Indications: MUSCLE SPASM 90 Cap 0    traMADol (ULTRAM) 50 mg tablet TK 1 T PO  Q 6 H PRN  0    tiZANidine (ZANAFLEX) 6 mg capsule TK 1 C PO TID PRN  1    ondansetron hcl (ZOFRAN) 8 mg tablet 8 mg.  promethazine (PHENERGAN) 25 mg suppository 25 mg.      magnesium oxide (GALLEGO) 500 mg tab Take 2 Caps by mouth daily. For constipation      diazePAM (VALIUM) 5 mg tablet Take 5 mg by mouth nightly as needed for Anxiety or Sleep. To relax muscle tension. Has not needed for a while, per patient repsot. 1    naproxen (NAPROSYN) 500 mg tablet Take 500 mg by mouth two (2) times daily (with meals). PRN. For pain and inflammation. Not taking at this time after surgery. 2    gabapentin (NEURONTIN) 300 mg capsule Take 1 Cap by mouth three (3) times daily (with meals). Indications: NEUROPATHIC PAIN (Patient not taking: Reported on 1/28/2019) 90 Cap 1       Allergies   Allergen Reactions    Fentanyl Nausea and Vomiting    Iodine Hives    Oxycodone Other (comments)    Penicillin G Other (comments)          PHYSICAL EXAMINATION    Visit Vitals  /86   Pulse 72   Temp 98.3 °F (36.8 °C)   Resp 24   Ht 5' 1\" (1.549 m)   Wt 247 lb (112 kg)   SpO2 98%   BMI 46.67 kg/m²       CONSTITUTIONAL: NAD, A&O x 3  SENSATION: Intact to light touch throughout  NEURO: Gasper's is negative bilaterally. RANGE OF MOTION: The patient has full passive range of motion in all four extremities. MOTOR:  Straight Leg Raise: Negative, bilateral     Shoulder AB/Flex Elbow Flex Wrist Ext Elbow Ext Wrist Flex Hand Intrin Tone   Right +4/5 +4/5 +4/5 +4/5 +4/5 +4/5 +4/5   Left +4/5 +4/5 +4/5 +4/5 +4/5 +4/5 +4/5              Hip Flex Knee Ext Knee Flex Ankle DF GTE Ankle PF Tone   Right +4/5 +4/5 +4/5 +4/5 +4/5 +4/5 +4/5   Left +4/5 +4/5 +4/5 +4/5 +4/5 +4/5 +4/5     RADIOGRAPHS  Preliminary reading of cervical plain films:  C3-4 disc arthroplasty is well positioned. No gross instability noted on flexion extension films.     These are being sent out for official reading by Dr. Truong Ramos. ASSESSMENT   Diagnoses and all orders for this visit:    1. Neck pain  -     AMB POC XRAY, SPINE, CERVICAL; 4+ VIE    2. Cervical postlaminectomy syndrome    3. Lumbar post-laminectomy syndrome    4. Lumbar neuritis    5. Cervical neuritis    6. Cervical radiculopathy    7. Psoriatic arthritis ([facility] [address]          IMPRESSION AND PLAN:  Upon reviewing diagnostic tests, I do not appreciate any spinal pathology to account or the patient's sxs. I will refer her to neurology for an EMG of the BUE and BLE. I will try her on Neurontin 300 mg TID. The risks, benefits, and potential side effects of this medication were discussed. Patient understands and wishes to proceed. Patient advised to call the office if intolerant to new medication. Patient is neurologically intact. I will see the patient back following EMG. Written by Simon Obando, as dictated by Konstantin Addison MD  I examined the patient, reviewed and agree with the note.

## 2019-04-08 NOTE — PROGRESS NOTES
1. Have you been to the ER, urgent care clinic since your last visit? Hospitalized since your last visit? No    2. Have you seen or consulted any other health care providers outside of the [address] [place] [address] since your last visit? Include any pap smears or colon screening.  No

## 2019-04-08 NOTE — LETTER
4/8/19 Patient: Tameka Grant YOB: 1970 Date of Visit: 4/8/2019 Vinicio Arnold MD 
[address] [facility] [address] [address] [place] [address] VIA Facsimile: [phone] Dear Vinicio Arnold MD, Thank you for referring Ms. Tameka Grant to [address] [place] [address] for evaluation. My notes for this consultation are attached. If you have questions, please do not hesitate to call me. I look forward to following your patient along with you. Sincerely, Luisa Steward MD

## 2019-04-08 NOTE — PROGRESS NOTES
EMG BUE is scheduled with Dr. Fracisco Chan, [address] [place] [address], [address], [phone] on 04/11/19, arrive 8:15AM, EMG BLE is on 04/30/19, arrive 8:15AM

## 2019-04-09 ENCOUNTER — TELEPHONE (OUTPATIENT)
Dept: ORTHOPEDIC SURGERY | Age: 49
End: 2019-04-09

## 2019-04-09 NOTE — TELEPHONE ENCOUNTER
Patient states her Gabapentin 300mg was sent to the wrong pharmacy.     It should have gone to [facility] on   [address] [phone] not [address]#[phone]

## 2019-04-10 RX ORDER — GABAPENTIN 300 MG/1
300 CAPSULE ORAL
Qty: 90 CAP | Refills: 1 | Status: SHIPPED | OUTPATIENT
Start: 2019-04-10 | End: 2019-08-22

## 2019-04-15 ENCOUNTER — OFFICE VISIT (OUTPATIENT)
Dept: SURGERY | Age: 49
End: 2019-04-15

## 2019-04-16 VITALS — BODY MASS INDEX: 46.33 KG/M2 | WEIGHT: 245.4 LBS | HEIGHT: 61 IN

## 2019-04-16 NOTE — PROGRESS NOTES
Andi Hansen participated in an educational session on the importance of starting to make healthy choices prior to weight loss surgery. General healthy foods were reviewed. Diet history was reviewed. Typical intake is as follows:  Breakfast: 3 pancakes with 2 sausage link and 2 eggs with cheese and 6 oz coffee  Lunch: 1 cup cereal with whole milk   Dinner: 4 oz pork chop with bbq sauce, lima beans, rice mix and biscuits with koolaid   Fluids: Water, Koolaid    Patient set a dietary, exercise, and behavioral goal in order to start making healthy changes now. Patient set the following goals: 1. Decrease intake of sweets and regular soda from 4 times per week to 2 times or less. 2.Increase activity by walking for at least 15 minutes -2 days per week.      Visit Vitals  Ht 5' 1\" (1.549 m)   Wt 111.3 kg (245 lb 6.4 oz)   BMI 46.37 kg/m²         Mil Zach

## 2019-05-06 ENCOUNTER — HOSPITAL ENCOUNTER (OUTPATIENT)
Dept: LAB | Age: 49
Discharge: HOME | End: 2019-05-06

## 2019-05-06 ENCOUNTER — OFFICE VISIT (OUTPATIENT)
Dept: SURGERY | Age: 49
End: 2019-05-06

## 2019-05-06 VITALS
BODY MASS INDEX: 45.41 KG/M2 | OXYGEN SATURATION: 98 % | WEIGHT: 240.5 LBS | DIASTOLIC BLOOD PRESSURE: 74 MMHG | HEART RATE: 86 BPM | TEMPERATURE: 98.6 F | RESPIRATION RATE: 16 BRPM | SYSTOLIC BLOOD PRESSURE: 110 MMHG | HEIGHT: 61 IN

## 2019-05-06 DIAGNOSIS — M50.00 HNP (HERNIATED NUCLEUS PULPOSUS) WITH MYELOPATHY, CERVICAL: ICD-10-CM

## 2019-05-06 DIAGNOSIS — I10 HYPERTENSION, UNSPECIFIED TYPE: ICD-10-CM

## 2019-05-06 DIAGNOSIS — L40.50 PSORIATIC ARTHRITIS (HCC): ICD-10-CM

## 2019-05-06 DIAGNOSIS — E66.01 MORBID OBESITY WITH BMI OF 45.0-49.9, ADULT (HCC): ICD-10-CM

## 2019-05-06 DIAGNOSIS — M48.02 CERVICAL SPINAL STENOSIS: ICD-10-CM

## 2019-05-06 DIAGNOSIS — E66.01 OBESITY, MORBID (HCC): ICD-10-CM

## 2019-05-06 DIAGNOSIS — N39.3 URINARY, INCONTINENCE, STRESS FEMALE: ICD-10-CM

## 2019-05-06 DIAGNOSIS — Z98.1 S/P CERVICAL SPINAL FUSION: ICD-10-CM

## 2019-05-06 DIAGNOSIS — R20.2 TINGLING IN EXTREMITIES: ICD-10-CM

## 2019-05-06 DIAGNOSIS — E66.01 OBESITY, MORBID (HCC): Primary | ICD-10-CM

## 2019-05-06 PROBLEM — M19.90 ARTHRITIS: Status: ACTIVE | Noted: 2019-05-06

## 2019-05-06 LAB — XX-LABCORP SPECIMEN COL,LCBCF: NORMAL

## 2019-05-06 PROCEDURE — 99001 SPECIMEN HANDLING PT-LAB: CPT

## 2019-05-06 RX ORDER — MINOCYCLINE HYDROCHLORIDE 100 MG/1
TABLET ORAL
Refills: 2 | COMMUNITY
Start: 2019-04-25

## 2019-05-06 NOTE — PROGRESS NOTES
Bariatric Surgery Consultation Subjective: The patient is a 53 y.o. obese female with a Body mass index is 45.44 kg/m². .  The patient is at her heaviest weight for the past 11 years. she has been overweight since having first child. she has been considering surgery since past 5 years. she desires surgery at this time because of multiple health concerns and their lifestyle issues which are hindered by their weight. she has been referred by his family physician Dr Hurst for evaluation and treatment of their obesity via surgical intervention. Ivy James has tried multiple diets in her lifetime most recently tried physician supervised, behavior modification, unsupervised diets, Weight Watchers and Phentermine Bariatric comorbidities present are Patient Active Problem List  
Diagnosis Code  Cervical herniated disc M50.20  Cervical spondylosis without myelopathy M47.812  Cervical neuritis M54.12  
 DDD (degenerative disc disease), cervical M50.30  Cervical spinal stenosis M48.02  
 HNP (herniated nucleus pulposus), cervical M50.20  S/P cervical spinal fusion Z98.1  Obesity, morbid ([address] E66.01  
 Psoriatic arthritis ([address] L40.50  Hypertension I10  
 HNP (herniated nucleus pulposus) with myelopathy, cervical M50.00  
 Urinary, incontinence, stress female N39.3  Migraines K58.123  Arthritis M19.90  Morbid obesity (HCC) E66.01  
 Morbid obesity with BMI of 45.0-49.9, adult (HCC) E66.01, Z68.42  
 Fibroids D21.9  Tingling in extremities R20.2 The patient is considering laparoscopic sleeve gastrectomy for surgical weight loss due to their ineffective progress with medical forms of weight loss and the urging of their physician who cares for their primary medical issues.  The patient  now presents  for consideration for weight loss surgery understanding the benefits of this over a medical approach of weight loss as was discussed in our presentation on weight loss surgery. They have discussed their plans both with their family and primary care physician who is in support of their pursuit of such. The patient has not had health issues as of late and denies and gastrointestinal disturbances other than what is outlined below in their review of symptoms. All of their prior evaluations available by both their PCP's and specialists physicians have been reviewed today either in the Care Everywhere portal or scanned under the media tab. I have spent a large portion of my initial consultation today reviewing the patients current dietary habits which have contributed to their health issues and obesity. I have suggested to them personally a dietary regimen that they can initiate now to help with their status as it pertains to their weight. They understand that the most important aspect of their journey through their weight loss endeavor will be their adherence to a new lifestyle of healthy eating behavior. They also understand that an adherence to an exercise program will not only help with weight loss but is ultimately important in weight maintenance. The patients goal weight is 148 lb. These goals are consistent with expected outcomes of their desired operation. her Medical goals are resolution of these health issues. Patient Active Problem List  
 Diagnosis Date Noted  Arthritis 05/06/2019  Psoriatic arthritis ([address]  Hypertension  HNP (herniated nucleus pulposus) with myelopathy, cervical   
 Urinary, incontinence, stress female  Migraines  Morbid obesity ([address]  Morbid obesity with BMI of 45.0-49.9, adult ([address]  Fibroids  Tingling in extremities  Obesity, morbid ([address] 01/28/2019  S/P cervical spinal fusion 03/30/2017  Cervical spinal stenosis 02/24/2017  
 HNP (herniated nucleus pulposus), cervical 02/24/2017  Cervical herniated disc 01/23/2017  Cervical spondylosis without myelopathy 2017  Cervical neuritis 2017  DDD (degenerative disc disease), cervical 2017 Past Surgical History:  
Procedure Laterality Date  HX  SECTION    
 x 3  
 HX CHOLECYSTECTOMY  HX HERNIA REPAIR    
 x2 umbilical  
 HX TUBAL LIGATION    
 NEUROLOGICAL PROCEDURE UNLISTED  2017 C3-4 Arthroplasty Social History Tobacco Use  Smoking status: Never Smoker  Smokeless tobacco: Never Used Substance Use Topics  Alcohol use: No  
  
Family History Problem Relation Age of Onset  Hypertension Mother  Cancer Mother  Hypertension Father  Heart Disease Father  Stroke Sister  Diabetes Maternal Grandmother Current Outpatient Medications Medication Sig Dispense Refill  minocycline (DYNACIN) 100 mg tablet TAKE 1 TABLET BY MOUTH EVERY DAY  2  
 gabapentin (NEURONTIN) 300 mg capsule Take 1 Cap by mouth three (3) times daily (with meals). Indications: Neuropathic Pain 90 Cap 1  
 benzonatate (TESSALON) 200 mg capsule Take 200 mg by mouth.  clindamycin-benzoyl peroxide (BENZACLIN) 1-5 % topical gel Apply  to affected area.  hydroCHLOROthiazide (HYDRODIURIL) 25 mg tablet Take 25 mg by mouth daily.  cholecalciferol, vitamin D3, (VITAMIN D3) 2,000 unit tab Take 4,000 Units by mouth daily.  cyanocobalamin 1,000 mcg tablet Take 1,000 mcg by mouth daily.  apremilast 30 mg tab Take 30 mg by mouth two (2) times a day.  acyclovir (ZOVIRAX) 400 mg tablet Take one tid for 5 days prn cold sores  meclizine (ANTIVERT) 25 mg tablet 25 mg.    
 diazePAM (VALIUM) 5 mg tablet Take 5 mg by mouth nightly as needed for Anxiety or Sleep. To relax muscle tension. Has not needed for a while, per patient repsot. 1  
 triamcinolone (NASACORT AQ) 55 mcg nasal inhaler 1 [place] by Both Nostrils route as needed (Seasonal Allergies).  traMADol (ULTRAM) 50 mg tablet Take 1 Tab by mouth every six (6) hours as needed. Max Daily Amount: 200 mg. Indications: Pain 40 Tab 0  
 tiZANidine (ZANAFLEX) 6 mg capsule Take 1 Cap by mouth three (3) times daily as needed. Indications: MUSCLE SPASM 90 Cap 0  
 gabapentin (NEURONTIN) 300 mg capsule Take 1 Cap by mouth three (3) times daily (with meals). Indications: NEUROPATHIC PAIN 90 Cap 1 Allergies Allergen Reactions  Fentanyl Nausea and Vomiting  Iodine Hives  Oxycodone Other (comments)  Penicillin G Other (comments) Review of Systems:  
 
 
General - No history or complaints of unexpected fever, chills, or weight loss Head/Neck - No history or complaints of headache, diplopia, dysphagia, hearing loss Cardiac - No history or complaints of chest pain, palpitations, murmur, or shortness of breath Pulmonary - No history or complaints of shortness of breath, productive cough, hemoptysis Gastrointestinal - rare reflux,no  abdominal pain, obstipation/constipation or blood per rectum Genitourinary - No history or complaints of hematuria/dysuria, stress urinary incontinence symptoms, or renal lithiasis Musculoskeletal - mod joint pain in all joints,  no muscular weakness Hematologic - No history or complaints of bleeding disorders,  No blood transfusions Neurologic - No history or complaints of  migraine headaches, seizure activity, syncopal episodes, TIA or stroke Integumentary - No history or complaints of rashes, abnormal nevi, skin cancer Gynecological - Has heavy bleeding from fibroids Objective:  
 
Visit Vitals /74 (BP 1 Location: Right arm, BP Patient Position: Sitting) Pulse 86 Temp 98.6 °F (37 °C) Resp 16 Ht 5' 1\" (1.549 m) Wt 109.1 kg (240 lb 8 oz) SpO2 98% BMI 45.44 kg/m² Physical Examination: General appearance - alert, well appearing, and in no distress and oriented to person, place, and time Mental status - alert, oriented to person, place, and time, normal mood, behavior, speech, dress, motor activity, and thought processes Eyes - pupils equal and reactive, extraocular eye movements intact, sclera anicteric, left eye normal, right eye normal 
Ears - right ear normal, left ear normal 
Nose - normal and patent, no erythema, discharge or polyps Mouth - mucous membranes moist, pharynx normal without lesions Neck - supple, no significant adenopathy Lymphatics - no palpable lymphadenopathy, no hepatosplenomegaly Chest - clear to auscultation, no wheezes, rales or rhonchi, symmetric air entry Heart - normal rate, regular rhythm, normal S1, S2, no murmurs, rubs, clicks or gallops Abdomen - soft, nontender, nondistended, no masses or organomegaly Back exam - full range of motion, no tenderness, palpable spasm or pain on motion Neurological - alert, oriented, normal speech, no focal findings or movement disorder noted Musculoskeletal - no joint tenderness, deformity or swelling Extremities - peripheral pulses normal, no pedal edema, no clubbing or cyanosis Skin - normal coloration and turgor, no rashes, no suspicious skin lesions noted Labs:  
 
 
No results found for this or any previous visit (from the past 1440 hour(s)). Assessment: Morbid obesity with comorbidity Plan:  
 
laparoscopic sleeve gastrectomy This is a 53 y.o. female with a BMI of Body mass index is 45.44 kg/m². and the weight-related co-morbidties as noted below. Viji meets the NIH criteria for bariatric surgery based upon the BMI of Body mass index is 45.44 kg/m². and multiple weight-related co-morbidties. Shara Connell has elected laparoscopic sleeve gastrectomy as her intervention of choice for treatment of morbid obestiy through surgical means secondary to its safety profile, rapid return to work  and decreases in operative risks over gastric bypass. In the office today, following Viji's history and physical examination, a 30 minute discussion regarding the anatomic alterations for the laparoscopic sleeve gastrectomy was undertaken. The dietary expectations and the patient and physician dependent factors for success were thoroughly discussed, to include the need for interval follow-up and long-term dietary changes associated with success. The possible complications of the sleeve gastrectomy  were also discussed, to include;death, DVT/PE, staple line leak, bleeding, stricture formation, infection, nutritional deficiencies and sleeve dilation. Specific weight related outcomes for success were also discussed with an emphasis on careful and close follow-up with the first year and eating behavior modification as the baseline and cyclical hunger return. The patient expressed an understanding of the above factors, and her questions were answered in their entirety. In addition, the patient attended a 1.5 hour power point seminar regarding obesity, surgical weight loss including, adjustable gastric band, gastric bypass, and sleeve gastrectomy. This discussion contrasted the different surgical techniques, mechanisms of actions and expected outcomes, and surgical and medical risks associated with each procedure. During this seminar, there was a long question and answer session where each questions was answered until there were no additional questions. Today, the patient had all of her questions answered and desires to proceed with  bariatric surgery initially choosing sleeve gastrectomy as her surgical option. Secondary Diagnoses:  
 
Dietary Intervention  - The patient is currently scheduled to see or has been followed by a bariatric nutritionist for an attempt at preoperative weight loss as has been dictated by their insurance carrier.   They will be assessed at various times during their follow up to evaluate their progress depending on the length of time that is required once again by their carrier. I have explained the importance of preoperative weight loss and the benefits regarding lower surgical risk and also assisting the patient in reaching their weight loss goal.  Finally they understand there is a physiologic benefit from the standpoint of hepatic volume reduction and reduction of central visceral adiposity preoperatively. I have reiterated the importance of a low carbohydrate and high protein regimen to achieve their stated goal. I have reviewed their current eating behavior prior to this encounter and explained to them in an exhaustive fashion the appropriate diet that they should adhere to. They have been encouraged to loose weight pre operatively and understand it is our prerogative to cancel surgery or postpone their procedure in the event of significant weight gain. The patients weight loss goal pre operatively is 5-10 pounds. Hypertension - The patient has a clear understanding of how weight loss improves hypertension as a whole, but also they understand that there is a significant genetic component to this disease process. We will monitor the patients blood pressure while in the hospital and the plan would be to continue those medications postoperatively.  If a diuretic is being used we will stop them on discharge to prevent dehydration particularly with the sleeve gastrectomy and the gastric bypass procedures.  They will be instructed to monitor their blood pressure postoperatively while at home and notify their primary care physician in the event of any significantly high or uncharacteristic readings. Weight Related Arthritis -The patient understands the benefits that weight loss surgery can have on their arthritis but also understands that weight loss is not a guaranteed cure and relief of symptoms is often dependent on the severity of the underlying disease.   The patient also understands that traditional pharmaceutical treatments for this diagnosis are usually unavailable to post-operative weight loss patients due to the effects on the gastrointestinal tract. Any changes to the patients medication treatment will ultimately be made the patients PCP with input by our office. Signed By: Myke Talbert MD   
 May 6, 2019

## 2019-05-06 NOTE — PATIENT INSTRUCTIONS
Body Mass Index: Care Instructions Your Care Instructions Body mass index (BMI) can help you see if your weight is raising your risk for health problems. It uses a formula to compare how much you weigh with how tall you are. · A BMI lower than 18.5 is considered underweight. · A BMI between 18.5 and 24.9 is considered healthy. · A BMI between 25 and 29.9 is considered overweight. A BMI of 30 or higher is considered obese. If your BMI is in the normal range, it means that you have a lower risk for weight-related health problems. If your BMI is in the overweight or obese range, you may be at increased risk for weight-related health problems, such as high blood pressure, heart disease, stroke, arthritis or joint pain, and diabetes. If your BMI is in the underweight range, you may be at increased risk for health problems such as fatigue, lower protection (immunity) against illness, muscle loss, bone loss, hair loss, and hormone problems. BMI is just one measure of your risk for weight-related health problems. You may be at higher risk for health problems if you are not active, you eat an unhealthy diet, or you drink too much alcohol or use tobacco products. Follow-up care is a key part of your treatment and safety. Be sure to make and go to all appointments, and call your doctor if you are having problems. It's also a good idea to know your test results and keep a list of the medicines you take. How can you care for yourself at home? · Practice healthy eating habits. This includes eating plenty of fruits, vegetables, whole grains, lean protein, and low-fat dairy. · If your doctor recommends it, get more exercise. Walking is a good choice. Bit by bit, increase the amount you walk every day. Try for at least 30 minutes on most days of the week. · Do not smoke. Smoking can increase your risk for health problems.  If you need help quitting, talk to your doctor about stop-smoking programs and medicines. These can increase your chances of quitting for good. · Limit alcohol to 2 drinks a day for men and 1 drink a day for women. Too much alcohol can cause health problems. If you have a BMI higher than 25 · Your doctor may do other tests to check your risk for weight-related health problems. This may include measuring the distance around your waist. A waist measurement of more than 40 inches in men or 35 inches in women can increase the risk of weight-related health problems. · Talk with your doctor about steps you can take to stay healthy or improve your health. You may need to make lifestyle changes to lose weight and stay healthy, such as changing your diet and getting regular exercise. If you have a BMI lower than 18.5 · Your doctor may do other tests to check your risk for health problems. · Talk with your doctor about steps you can take to stay healthy or improve your health. You may need to make lifestyle changes to gain or maintain weight and stay healthy, such as getting more healthy foods in your diet and doing exercises to build muscle. Where can you learn more? Go to http://keon-khoi.info/. Enter S176 in the search box to learn more about \"Body Mass Index: Care Instructions. \" Current as of: June 25, 2018 Content Version: 11.9 © 3582-0424 [employer]. Care instructions adapted under license by [employer] (which disclaims liability or warranty for this information). If you have questions about a medical condition or this instruction, always ask your healthcare professional. [place] [address] any warranty or liability for your use of this information. New patient Instructions 1. Ensure all pre-operative insurances requirements are complete (ie; dietary visits, psychology consults, primary care documentation, etc) 2. Adhere to pre-operative weight loss / weight maintenance plan discussed in the office today. 3. Contact the office with any questions on pre-operative clearance issues (ie; cardiology work-up, pulmonary work-up, upper GI study, etc). 4. If a barium upper GI study has been ordered for your evaluation, make sure you are on liquids only the morning of the procedure.

## 2019-05-09 ENCOUNTER — TELEPHONE (OUTPATIENT)
Dept: SURGERY | Age: 49
End: 2019-05-09

## 2019-05-09 DIAGNOSIS — A04.8 H. PYLORI INFECTION: Primary | ICD-10-CM

## 2019-05-09 LAB
ALBUMIN SERPL-MCNC: 4.1 G/DL (ref 3.5–5.5)
ALBUMIN/GLOB SERPL: 1.4 {RATIO} (ref 1.2–2.2)
ALP SERPL-CCNC: 97 IU/L (ref 39–117)
ALT SERPL-CCNC: 8 IU/L (ref 0–32)
AST SERPL-CCNC: 14 IU/L (ref 0–40)
BILIRUB SERPL-MCNC: <0.2 MG/DL (ref 0–1.2)
BUN SERPL-MCNC: 13 MG/DL (ref 6–24)
BUN/CREAT SERPL: 19 (ref 9–23)
CALCIUM SERPL-MCNC: 9.1 MG/DL (ref 8.7–10.2)
CHLORIDE SERPL-SCNC: 101 MMOL/L (ref 96–106)
CO2 SERPL-SCNC: 25 MMOL/L (ref 20–29)
CREAT SERPL-MCNC: 0.69 MG/DL (ref 0.57–1)
ERYTHROCYTE [DISTWIDTH] IN BLOOD BY AUTOMATED COUNT: 15.5 % (ref 12.3–15.4)
GLOBULIN SER CALC-MCNC: 3 G/DL (ref 1.5–4.5)
GLUCOSE SERPL-MCNC: 101 MG/DL (ref 65–99)
H PYLORI IGA SER-ACNC: <9 UNITS (ref 0–8.9)
H PYLORI IGG SER IA-ACNC: 1.36 INDEX VALUE (ref 0–0.79)
H PYLORI IGM SER-ACNC: <9 UNITS (ref 0–8.9)
HCT VFR BLD AUTO: 37.7 % (ref 34–46.6)
HGB BLD-MCNC: 11.6 G/DL (ref 11.1–15.9)
MCH RBC QN AUTO: 23.2 PG (ref 26.6–33)
MCHC RBC AUTO-ENTMCNC: 30.8 G/DL (ref 31.5–35.7)
MCV RBC AUTO: 76 FL (ref 79–97)
PLATELET # BLD AUTO: 273 X10E3/UL (ref 150–379)
POTASSIUM SERPL-SCNC: 3.6 MMOL/L (ref 3.5–5.2)
PROT SERPL-MCNC: 7.1 G/DL (ref 6–8.5)
RBC # BLD AUTO: 4.99 X10E6/UL (ref 3.77–5.28)
SODIUM SERPL-SCNC: 144 MMOL/L (ref 134–144)
TSH SERPL DL<=0.005 MIU/L-ACNC: 0.61 UIU/ML (ref 0.45–4.5)
WBC # BLD AUTO: 5.4 X10E3/UL (ref 3.4–10.8)

## 2019-05-09 RX ORDER — OMEPRAZOLE 20 MG/1
20 CAPSULE, DELAYED RELEASE ORAL
Qty: 28 CAP | Refills: 0 | Status: SHIPPED | OUTPATIENT
Start: 2019-05-09 | End: 2019-05-23

## 2019-05-09 RX ORDER — BISMUTH SUBSALICYLATE 262 MG/1
2 TABLET, CHEWABLE ORAL 4 TIMES DAILY
Qty: 112 TAB | Refills: 0 | Status: SHIPPED | OUTPATIENT
Start: 2019-05-09 | End: 2019-05-23

## 2019-05-09 RX ORDER — DOXYCYCLINE 100 MG/1
100 TABLET ORAL 2 TIMES DAILY
Qty: 20 TAB | Refills: 0 | Status: SHIPPED | OUTPATIENT
Start: 2019-05-09 | End: 2019-05-19

## 2019-05-09 RX ORDER — METRONIDAZOLE 500 MG/1
500 TABLET ORAL 2 TIMES DAILY
Qty: 28 TAB | Refills: 0 | Status: SHIPPED | OUTPATIENT
Start: 2019-05-09 | End: 2019-05-23

## 2019-05-09 NOTE — TELEPHONE ENCOUNTER
Spoke with patient about positive H. Pylori antibodies. Will send quadruple therapy due to PCN allergy. Take minocycline 100 mg daily for adult acne. Spoke with pharmacist, and will have her stop minocycline for 2 weeks, treat with BID doxycycline and then she can resume her minocycline. Pt verbalized understanding of instructions.

## 2019-05-16 ENCOUNTER — CLINICAL SUPPORT (OUTPATIENT)
Dept: SURGERY | Age: 49
End: 2019-05-16

## 2019-05-16 VITALS — HEIGHT: 61 IN | BODY MASS INDEX: 45.69 KG/M2 | WEIGHT: 242 LBS

## 2019-05-16 DIAGNOSIS — E66.01 MORBID OBESITY WITH BMI OF 45.0-49.9, ADULT (HCC): Primary | ICD-10-CM

## 2019-05-16 NOTE — PROGRESS NOTES
Medical Weight Loss Multi-Disciplinary Program    Name: Christina Cordoba   : 1970    Session# 2  Date: 2019     Height: 5' 1\" (154.9 cm)    Weight: 109.8 kg (242 lb) lbs. Body mass index is 45.73 kg/m². Pounds Lost: 0 lbs Pounds Gained: 2 lbs   Physician weight loss goal 5-10 lbs, consult weight 240 lbs     Behavior Modification    Positive attitude  Comments: Pt is working on the following goals: Patient has started exercising at work and has been trying protein supplements and is working to increase vegetable intake     Dietitian: Bruce Quinn is a 53 y.o. female who present for a pre-op evaluation. Visit Vitals  Ht 5' 1\" (1.549 m)   Wt 109.8 kg (242 lb)   BMI 45.73 kg/m²     Past Medical History:   Diagnosis Date    Arthritis     spinal stenosis    Fibroids     HNP (herniated nucleus pulposus) with myelopathy, cervical     Hypertension     Migraines     Morbid obesity ([address]     Morbid obesity with BMI of 45.0-49.9, adult (HCC)     Psoriatic arthritis ([address]     Dr. Meghann Perez in extremities     Urinary, incontinence, stress female            Procedure:  laparoscopic sleeve gastrectomy     Reasons for Surgery:  BMI > 40 with one or more medically significant comorbidities and HTN    Summary:  Pt given brief pre/post-op diet ed and diet hx reviewed. Pt instructed to follow a low calorie, low carbohydrate, high protein diet of about 0641-9745 calories daily. Pt set several goals. See below. What have you done in the past to try to lose weight? Exercise programs, wellness programs at work, portion controlled diets, weight watchers    Why didn't you lose weight or keep the weight off?: Patient reports lifestyle factors and inability to consistently find time to plan meals or exercise impact ability to maintain weight loss    Why do you think having weight loss surgery will make it possible for you to lose weight and keep it off?  Patient hopes that reforming behaviors and having portion restriction after surgery will assist with weight loss long term. Dietary Instructions    Nutritional Hx: What is the number of meals you eat per day? 3  Comment: Occasionally skips dinner     Do you eat between meals / snack? yes  Typical snack: Cereal with milk - patient reports cabinet at work filled with snacks - pork rinds, cookies, crackers, chips, candy bar     How fast do you eat your meals? slow    How often do you eat fast food? 5 times a week - patient has reduced intake     How many sodas/sugared beverages do you drink per day? Regular soda - 1-2 per week     How many caffeinated drinks do you have per day? Patient has switched decaf coffee     How much milk and/or juice do you drink per day? Whole milk with cereal OR with dessert    How much water do you drink per day? 8 (8oz glasses)    How often do you consume alcohol? never; none     Current Vitamins: Vitamin D3    Diet History:    Typical intake is as follows:  Breakfast: Protein shake  Lunch: Salad greens with baked fish  Dinner: SKIP evening meal and has snack food (candy) OR   Snacks: pork rinds, cookies, crackers, chips, candy bar   Fluids: Water, regular soda, decaf coffee     Reviewed intake  Understanding label reading  Understanding low carbohydrates, low sugar, higher protein meals  Understanding proper portions  Dining outside home  Instruction given for personal dietary changes  Discussed perceived compliance  Comments: Pt given brief pre/post-op diet ed and diet hx reviewed.      Patient Education and Materials Provided:  Supplement [facility], B Vitamin Information, MVI Recommendations, Calcium Citrate Information, Bariatric Supplement Companies, Protein Supplement Information, Fluid Requirements, No Caffeine or Carbonation, No Alcohol for One Year Post Op, 3 Balanced Meals a Day, Food Group Guide, Good Choices Dining Out, No Snacks, No Concentrated Sweets, Support System at Home, Exercising, Support Group Information and Addressed Current Habits / Changes to make  Physical Activity/Exercise    Discussed Perceived Compliance  Reasonable Goals Set  Motivation  Comments: none    Exercise:  Do you currently have an exercise routine? yes and just started walking at work   What kind of exercise do you do? Walking for 2-3 days per week for 20 minutes. Goals: Work to increase to 3 meals per day, with planned snacks as needed. Recommend following plate method for meal planning - focusing on lean protein, non-starchy vegetables, and measured amounts of starch. - Goal of  g protein and 100 g carbohydrate per day. 2. Increase non caloric fluid to 64 oz per day. Eliminate caffeine, added sugar, carbonation, and straws. 3.  Increase activity, continue with walking 2-3 days per week - goal of at least 150 minutes per week. Candidate for surgery (per RD):  PENDING  Chencho Scott RD  05/16/19

## 2019-05-29 ENCOUNTER — APPOINTMENT (OUTPATIENT)
Dept: GENERAL RADIOLOGY | Age: 49
End: 2019-05-29
Attending: SPECIALIST
Payer: COMMERCIAL

## 2019-05-29 ENCOUNTER — HOSPITAL ENCOUNTER (OUTPATIENT)
Age: 49
Setting detail: OUTPATIENT SURGERY
Discharge: HOME | End: 2019-05-29
Attending: SPECIALIST | Admitting: SPECIALIST
Payer: COMMERCIAL

## 2019-05-29 VITALS
HEIGHT: 61 IN | BODY MASS INDEX: 44.79 KG/M2 | HEART RATE: 92 BPM | SYSTOLIC BLOOD PRESSURE: 132 MMHG | DIASTOLIC BLOOD PRESSURE: 82 MMHG | RESPIRATION RATE: 15 BRPM | WEIGHT: 237.25 LBS | TEMPERATURE: 97.1 F | OXYGEN SATURATION: 99 %

## 2019-05-29 DIAGNOSIS — E66.01 MORBID OBESITY (HCC): ICD-10-CM

## 2019-05-29 PROCEDURE — 76040000019: Performed by: SPECIALIST

## 2019-05-29 PROCEDURE — 74240 X-RAY XM UPR GI TRC 1CNTRST: CPT

## 2019-05-29 PROCEDURE — 74011000255 HC RX REV CODE- 255: Performed by: SPECIALIST

## 2019-05-29 PROCEDURE — 77030032490 HC SLV COMPR SCD KNE COVD -B: Performed by: SPECIALIST

## 2019-05-29 NOTE — PROCEDURES
Patient:Viji Khanna   : 1970  Medical Record [id number]            PREPROCEDURE DIAGNOSIS: This patient is preoperative for laparoscopic sleeve gastrectomyprocedure with a history of  reflux disease. POSTPROCEDURE DIAGNOSIS: This patient is preoperative for laparoscopic sleeve gastrectomyprocedure with a history of  reflux disease. PROCEDURES PERFORMED: Upper GI study with barium. ESTIMATED BLOOD LOSS: None. SPECIMENS: None. STATEMENT OF MEDICAL NECESSITY: The patient is a patient with a  longstanding history of obesity. They are now considering the laparoscopic sleeve gastrectomyprocedure as a means of surgical weight control and due to their history of reflux disease and are being assessed preoperatively for such. DESCRIPTION OF PROCEDURE: The patient was brought to the fluoroscopy unit and  was given thin barium. On swallowing of barium, they were noted to have  normal peristalsis of their esophagus. They had prompt filling of distal  esophagus with tapering into the gastroesophageal junction. There was no evidence of a hiatal hernia present. Contrast then filled the gastric cardia, fundus,body and pre pyloric region with no abnormalities noted. Contrast then exited the pylorus in normal fashion. No obstruction was noted. There was no evidence of reflux noted.     (normal anatomy)    Tawana Rosenbaum MD O-L Flap Text: The defect edges were debeveled with a #15 scalpel blade.  Given the location of the defect, shape of the defect and the proximity to free margins an O-L flap was deemed most appropriate.  Using a sterile surgical marker, an appropriate advancement flap was drawn incorporating the defect and placing the expected incisions within the relaxed skin tension lines where possible.    The area thus outlined was incised deep to adipose tissue with a #15 scalpel blade.  The skin margins were undermined to an appropriate distance in all directions utilizing iris scissors.

## 2019-05-31 DIAGNOSIS — M54.12 CERVICAL RADICULOPATHY: ICD-10-CM

## 2019-05-31 DIAGNOSIS — M54.16 LUMBAR NEURITIS: ICD-10-CM

## 2019-05-31 DIAGNOSIS — L40.50 PSORIATIC ARTHRITIS (HCC): ICD-10-CM

## 2019-05-31 DIAGNOSIS — M54.12 CERVICAL NEURITIS: ICD-10-CM

## 2019-05-31 DIAGNOSIS — M96.1 CERVICAL POSTLAMINECTOMY SYNDROME: ICD-10-CM

## 2019-05-31 DIAGNOSIS — M54.2 NECK PAIN: ICD-10-CM

## 2019-05-31 DIAGNOSIS — M96.1 LUMBAR POST-LAMINECTOMY SYNDROME: ICD-10-CM

## 2019-06-20 ENCOUNTER — CLINICAL SUPPORT (OUTPATIENT)
Dept: SURGERY | Age: 49
End: 2019-06-20

## 2019-06-20 VITALS — WEIGHT: 237 LBS | HEIGHT: 61 IN | BODY MASS INDEX: 44.75 KG/M2

## 2019-06-20 DIAGNOSIS — E66.01 MORBID OBESITY WITH BMI OF 45.0-49.9, ADULT (HCC): Primary | ICD-10-CM

## 2019-06-20 NOTE — PROGRESS NOTES
Medical Weight Loss Multi-Disciplinary Program    Name: Jeremy Landa   : 1970    Session# 3  Date: 2019     Height: 5' 1\" (154.9 cm)    Weight: 107.5 kg (237 lb) lbs. Body mass index is 44.78 kg/m². Pounds Lost: 5 lbs Pounds Gained:     Dietary Instructions    Reviewed intake  Understanding label reading  Understanding low carbohydrates, low sugar, higher protein meals  Understanding proper portions  Dining outside home  Instruction given for personal dietary changes  Discussed perceived compliance  Comments: Today the majority of our visit was spent reviewing patient's food recall and identifying areas for improvement. Educated  on the importance of eating 3 meals/day at regularly scheduled times including breakfast within 1st 1-2 hours of waking. Suggested patient use a protein supplement as a meal replacement instead of skipping OR as a between meal high protein snack. Also educated on the importance of including a protein with every meal and snack and reviewed lean sources. Lastly introduced  the Plate Method for Meal Planning and used food models to assist with visualizing recommended serving sizes. Typical intake is as follows:  Breakfast: 1 egg with 3/4 cup strawberries OR 1 medium orange -  Stopped eating oatmeal   Lunch: Salad with peppers, cucumbers, tomatoes, and 3 oz baked chicken  Dinner: - 1 piece of fried chicken with  Snacks: -avoiding snacking - utilizing watermelon occasionally to replace candy cravings   Fluids: 60-80 oz water     Physical Activity/Exercise    Discussed Perceived Compliance  Reasonable Goals Set  Comments: none, Patient has not been as active this month, going on cruise    Behavior Modification    Identify obstacles to trigger change  Achieving/Rewarding goals met  Positive attitude  Comments: none    Goals:   1. Work to increase to 3 meals per day, with planned snacks as needed.   Recommend following plate method for meal planning - focusing on lean protein, non-starchy vegetables, and measured amounts of starch. - Goal of  g protein and 100 g carbohydrate per day. - Focus on pairing protein with carbohydrates and focus on measuring carbohydrate portion. 2. Increase non caloric fluid to 64 oz per day. Eliminate caffeine, added sugar, carbonation, and straws. 3.  Increase activity, continue with walking 2-3 days per week - goal of at least 150 minutes per week. Candidate for surgery (per RD): PENDING     Dietitian: Autumn Santos

## 2019-07-05 DIAGNOSIS — R20.2 TINGLING IN EXTREMITIES: ICD-10-CM

## 2019-07-22 ENCOUNTER — CLINICAL SUPPORT (OUTPATIENT)
Dept: SURGERY | Age: 49
End: 2019-07-22

## 2019-07-22 VITALS — BODY MASS INDEX: 44.93 KG/M2 | WEIGHT: 238 LBS | HEIGHT: 61 IN

## 2019-07-22 DIAGNOSIS — E66.01 MORBID OBESITY WITH BMI OF 45.0-49.9, ADULT (HCC): Primary | ICD-10-CM

## 2019-07-22 NOTE — PROGRESS NOTES
Medical Weight Loss Multi-Disciplinary Program    Name: Ana Odell   : 1970    Session# 4  Date: 2019     Height: 5' 1\" (154.9 cm)    Weight: 108 kg (238 lb) lbs. Body mass index is 44.97 kg/m². Pounds Lost: 0 lbs Pounds Gained: 1 lbs  Consult weight 240 lbs, physician weight loss goal 5-10 lbs    Dietary Instructions    Reviewed intake  Understanding label reading  Understanding low carbohydrates, low sugar, higher protein meals  Understanding proper portions  Dining outside home  Instruction given for personal dietary changes  Discussed perceived compliance  Comments: Patient recently returned from vacation ([employer]). Reports focusing on activity, eating lean protein avoiding meal skipping and excessive snacking. She did restart regular soda, and has still been consuming since returning home. Primarily discussed meal planning and tips to getting back on track today. Patient is aware of behaviors to continue modifying and is working to consistently make lifestyle changes. After reviewing patient's food records and identifying continued areas for improvement. Introduced the 3 gram rule for fat and sugar and patient demonstrated the ability to read nutrition labels.   Next introduced key diet principles following weight loss surgery and helped identify areas to continue making progress  Typical intake is as follows:  Breakfast: 1 egg with 3/4 cup strawberries OR 1 medium orange -  Stopped eating oatmeal   Lunch: Salad with peppers, cucumbers, tomatoes, and 3 oz baked chicken  Dinner: - 1 piece of fried chicken with mac and cheese and steamed vegetables OR Baked fish with steamed vegetables and yams   Snacks: -avoiding snacking - utilizing watermelon occasionally to replace candy cravings   Fluids: 60-80 oz water, 1 (20 oz regular soda per day)     Physical Activity/Exercise    Discussed Perceived Compliance  Reasonable Goals Set  Comments: Patient has not yet set activity regimen, but was very active/mobile while on vacation walking 3-5 miles per day. Recommend patient start trying to add timed/scheduled activity as able     Behavior Modification    Identify obstacles to trigger change  Achieving/Rewarding goals met  Positive attitude  Comments: none    Goals:   1. Work to increase to 3 meals per day, with planned snacks as needed. Recommend following plate method for meal planning - focusing on lean protein, non-starchy vegetables, and measured amounts of starch. - Goal of  g protein and 75- 100 g carbohydrate per day. - Focus on pairing protein with carbohydrates and focus on measuring carbohydrate portion.   -Utilize crock pot for easy quick protein meals in the evening   -Opt for protein supplement as a snack to control afternoon hunger    2. Increase non caloric fluid to 64 oz per day. Eliminate caffeine, added sugar, carbonation, and straws. Eliminate regular soda     3. Increase activity, continue with walking 2-3 days per week - goal of at least 150 minutes per week. Candidate for surgery (per RD): PENDING     Dietitian: Harjeet Albert

## 2019-08-20 NOTE — PROGRESS NOTES
Bariatric Surgery Consultation    Subjective:     Ana Odell is a 53 y.o. obese female with a Body mass index is 45.78 kg/m². Sameera Coleman she desires surgery at this time because of health issues and quality of life issues. Ana Odell has been seen by a bariatric nutritionist and has been placed on an appropriate low carbohydrate diet. The patient desires laparoscopic sleeve gastrectomy for surgical weight loss, however she is here today to review their workup to date. Ana Odell is here also today to check progress with weight loss / evaluate nutritional status and review all subspecialty clearances in hopes of proceeding to the operating room. Patient had a lumbar puncture on Aug 1, 2019 to r/o MS which was negative. In July she had an MRI of her brain and 40-50 lesions were found by Dr. Lisa Mcginnis from [facility].      Patient Active Problem List    Diagnosis Date Noted    Brain lesion 2019    Arthritis 2019    Psoriatic arthritis ([address]     Hypertension     HNP (herniated nucleus pulposus) with myelopathy, cervical     Urinary, incontinence, stress female     Migraines     Morbid obesity ([address]     Morbid obesity with BMI of 45.0-49.9, adult ([address]     Fibroids     Tingling in extremities     Obesity, morbid ([address] 2019    S/P cervical spinal fusion 2017    Cervical spinal stenosis 2017    HNP (herniated nucleus pulposus), cervical 2017    Cervical herniated disc 2017    Cervical spondylosis without myelopathy 2017    Cervical neuritis 2017    DDD (degenerative disc disease), cervical 2017      Past Surgical History:   Procedure Laterality Date    HX  SECTION      x 3    HX CHOLECYSTECTOMY      HX HERNIA REPAIR      x2 umbilical    HX TUBAL LIGATION      NEUROLOGICAL PROCEDURE UNLISTED  2017    C3-4 Arthroplasty      Social History     Tobacco Use    Smoking status: Never Smoker    Smokeless tobacco: Never Used   Substance Use Topics    Alcohol use: No      Family History   Problem Relation Age of Onset    Hypertension Mother     Cancer Mother     Hypertension Father     Heart Disease Father     Stroke Sister     Diabetes Maternal Grandmother       Current Outpatient Medications   Medication Sig Dispense Refill    minocycline (DYNACIN) 100 mg tablet TAKE 1 TABLET BY MOUTH EVERY DAY  2    benzonatate (TESSALON) 200 mg capsule Take 200 mg by mouth.  clindamycin-benzoyl peroxide (BENZACLIN) 1-5 % topical gel Apply  to affected area.  hydroCHLOROthiazide (HYDRODIURIL) 25 mg tablet Take 25 mg by mouth daily.  cholecalciferol, vitamin D3, (VITAMIN D3) 2,000 unit tab Take 4,000 Units by mouth daily.  cyanocobalamin 1,000 mcg tablet Take 1,000 mcg by mouth daily.  apremilast 30 mg tab Take 30 mg by mouth two (2) times a day.  acyclovir (ZOVIRAX) 400 mg tablet Take one tid for 5 days prn cold sores      meclizine (ANTIVERT) 25 mg tablet 25 mg.      diazePAM (VALIUM) 5 mg tablet Take 5 mg by mouth nightly as needed for Anxiety or Sleep. To relax muscle tension. Has not needed for a while, per patient repsot. 1    triamcinolone (NASACORT AQ) 55 mcg nasal inhaler 1 [place] by Both Nostrils route as needed (Seasonal Allergies).  traMADol (ULTRAM) 50 mg tablet Take 1 Tab by mouth every six (6) hours as needed. Max Daily Amount: 200 mg. Indications: Pain 40 Tab 0    tiZANidine (ZANAFLEX) 6 mg capsule Take 1 Cap by mouth three (3) times daily as needed. Indications: MUSCLE SPASM 90 Cap 0    gabapentin (NEURONTIN) 300 mg capsule Take 1 Cap by mouth three (3) times daily (with meals).  Indications: NEUROPATHIC PAIN 90 Cap 1     Allergies   Allergen Reactions    Fentanyl Nausea and Vomiting    Iodine Hives    Oxycodone Other (comments)    Penicillin G Other (comments)          Review of Systems:          General - No history or complaints of unexpected fever, chills, or weight loss  Head/Neck - History of headaces and vertigo. No history or complaints of diplopia, dysphagia, hearing loss  Cardiac - No history or complaints of chest pain, palpitations, murmur, or shortness of breath  Pulmonary - No history or complaints of shortness of breath, productive cough, hemoptysis  Gastrointestinal - rare reflux, no abdominal pain, obstipation/constipation or blood per rectum  Genitourinary - GIO and urinary frequency, nocturia. No history or complaints of hematuria/dysuria, or renal lithiasis  Musculoskeletal - Hx of mod joint pain in all joints, no muscular weakness  Hematologic - No history or complaints of bleeding disorders, no blood transfusions  Neurologic - History of Migraines.  No history or complaints of seizure activity, syncopal episodes, TIA or stroke  Integumentary - No history or complaints of rashes, abnormal nevi, skin cancer  Gynecological - Has heavy bleeding from fibroids          Objective:     Visit Vitals  /81 (BP 1 Location: Left arm, BP Patient Position: Sitting)   Pulse 69   Resp 16   Ht 5' 1\" (1.549 m)   Wt 109.9 kg (242 lb 4.8 oz)   SpO2 100%   BMI 45.78 kg/m²       Physical Examination: General appearance - alert, well appearing, and in no distress and oriented to person, place, and time  Mental status - alert, oriented to person, place, and time, normal mood, behavior, speech, dress, motor activity, and thought processes  Eyes - pupils equal and reactive, extraocular eye movements intact, sclera anicteric, left eye normal, right eye normal  Ears - right ear normal, left ear normal  Nose - normal and patent, no erythema, discharge or polyps  Mouth - mucous membranes moist, pharynx normal without lesions, Mallampati II  Neck - supple, no significant adenopathy  Lymphatics - no palpable lymphadenopathy, no hepatosplenomegaly  Chest - clear to auscultation, no wheezes, rales or rhonchi, symmetric air entry  Heart - normal rate, regular rhythm, normal S1, S2, no murmurs, rubs, clicks or gallops  Abdomen - Previous ventral hernia repair, gynecoid body habitus, soft, nontender, nondistended, no masses or organomegaly  Back exam - full range of motion, no tenderness, palpable spasm or pain on motion  Neurological - alert, oriented, normal speech, no focal findings or movement disorder noted  Musculoskeletal - no joint tenderness, deformity or swelling  Extremities - peripheral pulses normal, no pedal edema, no clubbing or cyanosis  Skin - normal coloration and turgor, no rashes, no suspicious skin lesions noted    UGI:   On swallowing of barium, they were noted to have  normal peristalsis of their esophagus. They had prompt filling of distal  esophagus with tapering into the gastroesophageal junction. There was no evidence of a hiatal hernia present. Contrast then filled the gastric cardia, fundus,body and pre pyloric region with no abnormalities noted. Contrast then exited the pylorus in normal fashion. No obstruction was noted. There was no evidence of reflux noted.     (normal anatomy)    Labs:     No results found for this or any previous visit (from the past 2016 hour(s)). Assessment:     Morbid obesity with associated comorbidity & HTN    Plan:     Continuation of Pre-Operative evaluation / clearance. Prakash Botello has returned to the office today to discuss her status as a surgical candidate.  her progress has been noted and reviewed. We will continue the pre-operative process and work towards goals as outlined. she has 7-10 more pounds to lose before proceeding to the OR.  (0 pounds lost since last visit)  she has 1 more nutritional visits to complete before proceeding to the OR  she has an outstanding neurology and PCP clearance to review before proceeding to the OR. She is getting a study on 10/08/19. She has a f/u appt with her neurologist on 10/21/19. Patient has Hx of ventral hernia repair.      Prakash Botello understand the rationales for all the above.  It has been discussed that given her current condition that the best surgical option for this patient would be the laparoscopic sleeve gastrectomy. Coco Lara agrees with the surgical choice and has been educated in it's; risks, benefits, and alternatives. We will continue with the pre-operative evaluation as needed to check progress. Secondary Diagnoses:     Dietary Intervention  - The patient is currently scheduled to see or has been followed by a bariatric nutritionist for an attempt at preoperative weight loss as has been dictated by their insurance carrier. They will be assessed at various times during their follow up to evaluate their progress depending on the length of time that is required once again by their carrier. I have explained the importance of preoperative weight loss and the benefits regarding lower surgical risk and also assisting the patient in reaching their weight loss goal.  Finally they understand there is a physiologic benefit from the standpoint of hepatic volume reduction and reduction of central visceral adiposity preoperatively. I have reiterated the importance of a low carbohydrate and high protein regimen to achieve their stated goal. I have reviewed their current eating behavior prior to this encounter and explained to them in an exhaustive fashion the appropriate diet that they should adhere to. They have been encouraged to loose weight pre operatively and understand it is our prerogative to cancel surgery or postpone their procedure in the event of significant weight gain.     Hypertension - The patient has a clear understanding of how weight loss improves hypertension as a whole, but also they understand that there is a significant genetic component to this disease process.  We will monitor the patients blood pressure while in the hospital and the plan would be to continue those medications postoperatively.  If a diuretic is being used we will stop them on discharge to prevent dehydration particularly with the sleeve gastrectomy and the gastric bypass procedures.  They will be instructed to monitor their blood pressure postoperatively while at home and notify their primary care physician in the event of any significantly high or uncharacteristic readings.     Weight Related Arthritis -The patient understands the benefits that weight loss surgery can have on their arthritis but also understands that weight loss is not a guaranteed cure and relief of symptoms is often dependent on the severity of the underlying disease. The patient also understands that traditional pharmaceutical treatments for this diagnosis are usually unavailable to post-operative weight loss patients due to the effects on the gastrointestinal tract. Any changes to the patients medication treatment will ultimately be made the patients PCP with input by our office.     Signed By: SUKHDEEP Dickson     August 22, 2019

## 2019-08-20 NOTE — PATIENT INSTRUCTIONS
New patient Instructions      1. Ensure all pre-operative insurances requirements are complete (ie; dietary visits, psychology consults, primary care documentation, etc)    2. Adhere to pre-operative weight loss / weight maintenance plan discussed in the office today. 3. Contact the office with any questions on pre-operative clearance issues (ie; cardiology work-up, pulmonary work-up, upper GI study, etc). 4. If a barium upper GI study has been ordered for your evaluation, make sure you are on liquids only the morning of the procedure. For chip replacements:   [employer] ([employer], [employer])  [place] ([facility] [place]  Evel[employer] ([employer], [facility], [employer])    [employer] with protein powder\"   When baking replace coconut flour with almond flour

## 2019-08-22 ENCOUNTER — OFFICE VISIT (OUTPATIENT)
Dept: SURGERY | Age: 49
End: 2019-08-22

## 2019-08-22 ENCOUNTER — CLINICAL SUPPORT (OUTPATIENT)
Dept: SURGERY | Age: 49
End: 2019-08-22

## 2019-08-22 VITALS
BODY MASS INDEX: 45.75 KG/M2 | HEIGHT: 61 IN | RESPIRATION RATE: 16 BRPM | HEART RATE: 69 BPM | DIASTOLIC BLOOD PRESSURE: 81 MMHG | OXYGEN SATURATION: 100 % | WEIGHT: 242.3 LBS | SYSTOLIC BLOOD PRESSURE: 130 MMHG

## 2019-08-22 VITALS — BODY MASS INDEX: 45.69 KG/M2 | HEIGHT: 61 IN | WEIGHT: 242 LBS

## 2019-08-22 DIAGNOSIS — E66.01 MORBID OBESITY WITH BMI OF 45.0-49.9, ADULT (HCC): Primary | ICD-10-CM

## 2019-08-22 DIAGNOSIS — Z98.890 HX OF VENTRAL HERNIA REPAIR: ICD-10-CM

## 2019-08-22 DIAGNOSIS — G93.9 BRAIN LESION: ICD-10-CM

## 2019-08-22 DIAGNOSIS — E66.01 MORBID OBESITY WITH BMI OF 40.0-44.9, ADULT (HCC): ICD-10-CM

## 2019-08-22 DIAGNOSIS — E66.01 MORBID OBESITY (HCC): Primary | ICD-10-CM

## 2019-08-22 DIAGNOSIS — Z87.19 HX OF VENTRAL HERNIA REPAIR: ICD-10-CM

## 2019-08-22 NOTE — PATIENT INSTRUCTIONS
Goals: 1. Work to walk on the treadmill 2-3 days a week for 30 minutes plus walking at/during work     2. Continue to eat three meals a day - this month - work to focus on picking appropriate food options - protein and non-starchy vegetables, keeping carbohydrates to 100 grams or less per day. 3. When going to fast food - remember to be mindful of what you pick/drink - you don't need the bun and the fries with the sweet tea, etc.     4. Work to really get 64 ounces of non-caloric fluid a day     5.  Look for a lactose free protein shake and give GenePro a try

## 2019-08-22 NOTE — PROGRESS NOTES
Medical Weight Loss Multi-Disciplinary Program    Name: Stiven Kendrick   : 1970    Session# 5  Date: 2019     Height: 5' 1\" (154.9 cm)    Weight: 109.8 kg (242 lb) lbs. Body mass index is 45.73 kg/m². Pounds Gained: 4    Patient has a 5-10# weight loss goal from 240#    Dietary Instructions    Reviewed intake  Instruction given for personal dietary changes  Discussed perceived compliance  Comments: reviewed patient's past monthly diet hx. Patient has been dealing with some neurological concerns, has to get clearance from her neurologist. Patient did eat three meals a day     Breakfast: pack of nabs  Morning Snack: none   Beverage - decaf coffee and water      Lunch - burger and fries (fast food)  Afternoon Snack - none  Beverage - sweet tea with her meal    Dinner - protein, vegetable and starch  Evening Snack: none  Beverage - water     Fluid - patient feels she did not do well getting her fluid in this month - maybe averages 32 ounces this past month. Physical Activity/Exercise    Reviewed Activity Log  Discussed Perceived Compliance  Reasonable Goals Set  Motivation  Comments: patient was not able to exercise due to being stress, neurologist encouraged her to exercise to get her immune system up     Behavior Modification    Reviewed behavior modification log  Identify obstacles to trigger change  Achieving/Rewarding goals met  Positive attitude  Discussed perceived compliance  Comments:     Goals:  1. Work to walk on the treadmill 2-3 days a week for 30 minutes plus walking at/during work     2. Continue to eat three meals a day - this month - work to focus on picking appropriate food options - protein and non-starchy vegetables, keeping carbohydrates to 100 grams or less per day. 3. When going to fast food - remember to be mindful of what you pick/drink - you don't need the bun and the fries with the sweet tea, etc.     4. Work to really get 64 ounces of non-caloric fluid a day     5. Look for a lactose free protein shake and give GenePro a try     Candidate for surgery (per RD): PENDING     Dietitian: Sharlene Merlin

## 2019-09-20 ENCOUNTER — CLINICAL SUPPORT (OUTPATIENT)
Dept: SURGERY | Age: 49
End: 2019-09-20

## 2019-09-20 ENCOUNTER — TELEPHONE (OUTPATIENT)
Dept: SURGERY | Age: 49
End: 2019-09-20

## 2019-09-20 VITALS — WEIGHT: 242 LBS | HEIGHT: 61 IN | BODY MASS INDEX: 45.69 KG/M2

## 2019-09-20 DIAGNOSIS — E66.01 MORBID OBESITY (HCC): Primary | ICD-10-CM

## 2019-09-20 NOTE — PROGRESS NOTES
Medical Weight Loss Multi-Disciplinary Program    Name: Mago Carey   : 1970    Session# 6  Date: 2019     Height: 5' 1\" (154.9 cm)    Weight: 109.8 kg (242 lb) lbs. Body mass index is 45.73 kg/m². Pounds Lost: 0 Pounds Gained: 0 - same weight     Dietary Instructions    Reviewed intake  Instruction given for personal dietary changes  Discussed perceived compliance  Comments: reviewed patient's past monthly diet hx. Patient states she did not do as well this month with her diet goals. She usually drinks 2 bottles a day - was only getting 1 bottle of water a day and drinking soda ( purchased soda this month). She is very stressed d/t a medical issue and has been eating for comfort. She did TRY to work on her carbs - keeping them to 40-50 grams or less per day - she has been trying to work on decreasing those - feels she's been trying to stay away from carbohydrates - telling herself everything should be a protein and have a protein - she is drinking a protein shake - from tropical smoothie. Physical Activity/Exercise    Reviewed Activity Log  Discussed Perceived Compliance  Reasonable Goals Set  Motivation  Comments: patient went to the gym a total of 6 days (which is more than she's gone) total for the whole month and started parking further away, she's also started going out more on her breaks and lunch - she'll walk around more. Behavior Modification    Reviewed behavior modification log  Identify obstacles to trigger change  Achieving/Rewarding goals met  Positive attitude  Discussed perceived compliance  Comments:     Goals:  1. Work on stress techniques and work to take better care of yourself now. 2. Work to get back on track with eating three meals a day focusing on protein and non-starchy vegetables.    In place of tropical smoothie - go for an appropriate protein shake - Premier (can purchase a case of  fruit-flavored one, or sweet based ones) use in place of smoothie (hidden calories, hidden carbohydrates,  not the best choice for a high protein low carbohydrate breakfast or lunch). 3. Work to consistently go to the gym 3 days a week for 30 minutes in order to not only exercise, but to also help reduce stress. 4. Work to make sure you're drinking 4 16-ounce bottles of water a day, or the equivalent to that (64 ounces) of some type of non-caloric fluid a day.       Candidate for surgery (per RD): YES    Dietitian: Sabine Dooley

## 2019-09-20 NOTE — PATIENT INSTRUCTIONS
Goals: 1. Work on stress techniques and work to take better care of yourself now. 2. Work to get back on track with eating three meals a day focusing on protein and non-starchy vegetables. In place of tropical smoothie - go for an appropriate protein shake - Premier (can purchase a case of  fruit-flavored one, or sweet based ones) use in place of smoothie (hidden calories, hidden carbohydrates,  not the best choice for a high protein low carbohydrate breakfast or lunch). 3. Work to consistently go to the gym 3 days a week for 30 minutes in order to not only exercise, but to also help reduce stress. 4. Work to make sure you're drinking 4 16-ounce bottles of water a day, or the equivalent to that (64 ounces) of some type of non-caloric fluid a day.

## 2019-09-20 NOTE — PROGRESS NOTES
Progress Note:  I talked with Ms. Khanna. There was some confusion as to what type of form she needed filled out prior to her surgery. She did have her chronic care doctor clear her for her surgery, but her neurologist, Dr. Ly Romero, was somewhat confused by the form and felt like it was not a form he should be filling out since it was a primary care doctor form. In any case, I was able to talk to Dr. Ly Romero regarding patient's workup for these tiny white lesions she had on her MRI of the brain. He states they are very common, sometimes present in people with migraine headaches, and she had an extensive workup for any intracranial issues and even multiple sclerosis, which is negative. Dr. Ly Romero is comfortable with her proceeding with surgery and I have explained to the physician that there is no need in him sending us a form, I will simply document our conversation in the patient's chart. We will let ms. Khanna know that she is cleared to go ahead with surgery.

## 2019-10-21 DIAGNOSIS — I10 HYPERTENSION, UNSPECIFIED TYPE: Primary | ICD-10-CM

## 2019-10-21 DIAGNOSIS — E66.01 MORBID OBESITY (HCC): ICD-10-CM

## 2019-10-21 DIAGNOSIS — Z01.812 BLOOD TESTS PRIOR TO TREATMENT OR PROCEDURE: ICD-10-CM

## 2019-10-28 ENCOUNTER — OFFICE VISIT (OUTPATIENT)
Dept: SURGERY | Age: 49
End: 2019-10-28

## 2019-10-28 ENCOUNTER — HOSPITAL ENCOUNTER (OUTPATIENT)
Dept: LAB | Age: 49
Discharge: HOME | End: 2019-10-28

## 2019-10-28 ENCOUNTER — HOSPITAL ENCOUNTER (OUTPATIENT)
Dept: PREADMISSION TESTING | Age: 49
Discharge: HOME | End: 2019-10-28
Payer: COMMERCIAL

## 2019-10-28 ENCOUNTER — NURSE NAVIGATOR (OUTPATIENT)
Dept: SURGERY | Age: 49
End: 2019-10-28

## 2019-10-28 VITALS
SYSTOLIC BLOOD PRESSURE: 125 MMHG | WEIGHT: 244 LBS | RESPIRATION RATE: 16 BRPM | HEART RATE: 85 BPM | DIASTOLIC BLOOD PRESSURE: 70 MMHG | BODY MASS INDEX: 46.07 KG/M2 | OXYGEN SATURATION: 100 % | TEMPERATURE: 97.5 F | HEIGHT: 61 IN

## 2019-10-28 DIAGNOSIS — E66.01 MORBID OBESITY WITH BMI OF 40.0-44.9, ADULT (HCC): Primary | ICD-10-CM

## 2019-10-28 DIAGNOSIS — Z01.812 BLOOD TESTS PRIOR TO TREATMENT OR PROCEDURE: ICD-10-CM

## 2019-10-28 DIAGNOSIS — N39.3 URINARY, INCONTINENCE, STRESS FEMALE: ICD-10-CM

## 2019-10-28 DIAGNOSIS — E66.01 MORBID OBESITY (HCC): ICD-10-CM

## 2019-10-28 DIAGNOSIS — G89.18 POST-OPERATIVE PAIN: Primary | ICD-10-CM

## 2019-10-28 DIAGNOSIS — L40.50 PSORIATIC ARTHRITIS (HCC): ICD-10-CM

## 2019-10-28 DIAGNOSIS — I10 HYPERTENSION, UNSPECIFIED TYPE: ICD-10-CM

## 2019-10-28 DIAGNOSIS — G43.809 OTHER MIGRAINE WITHOUT STATUS MIGRAINOSUS, NOT INTRACTABLE: ICD-10-CM

## 2019-10-28 DIAGNOSIS — Z98.890 HX OF VENTRAL HERNIA REPAIR: ICD-10-CM

## 2019-10-28 DIAGNOSIS — M19.90 ARTHRITIS: ICD-10-CM

## 2019-10-28 DIAGNOSIS — E66.01 MORBID OBESITY WITH BMI OF 45.0-49.9, ADULT (HCC): Primary | ICD-10-CM

## 2019-10-28 DIAGNOSIS — Z87.19 HX OF VENTRAL HERNIA REPAIR: ICD-10-CM

## 2019-10-28 LAB
ABO + RH BLD: NORMAL
BLOOD GROUP ANTIBODIES SERPL: NORMAL
SPECIMEN EXP DATE BLD: NORMAL
XX-LABCORP SPECIMEN COL,LCBCF: NORMAL

## 2019-10-28 PROCEDURE — 99001 SPECIMEN HANDLING PT-LAB: CPT

## 2019-10-28 PROCEDURE — 86900 BLOOD TYPING SEROLOGIC ABO: CPT

## 2019-10-28 PROCEDURE — 93005 ELECTROCARDIOGRAM TRACING: CPT

## 2019-10-28 RX ORDER — HYDROMORPHONE HYDROCHLORIDE 2 MG/1
2 TABLET ORAL
Qty: 30 TAB | Refills: 0 | Status: SHIPPED | OUTPATIENT
Start: 2019-10-28 | End: 2019-10-31

## 2019-10-28 RX ORDER — ENOXAPARIN SODIUM 100 MG/ML
40 INJECTION SUBCUTANEOUS EVERY 12 HOURS
Qty: 14 SYRINGE | Refills: 0 | Status: ON HOLD | OUTPATIENT
Start: 2019-10-28 | End: 2019-11-06

## 2019-10-28 RX ORDER — OMEPRAZOLE 20 MG/1
20 CAPSULE, DELAYED RELEASE ORAL DAILY
Qty: 30 CAP | Refills: 2 | Status: SHIPPED | OUTPATIENT
Start: 2019-10-28 | End: 2019-11-27 | Stop reason: SDUPTHER

## 2019-10-28 NOTE — PROGRESS NOTES
Tammy Alfaro presents today for   Chief Complaint   Patient presents with    Follow-up     Pt is here today for her follow up       Is someone accompanying this pt? no    Is the patient using any DME equipment during OV? no    Depression Screening:  3 most recent PHQ Screens 10/28/2019   Little interest or pleasure in doing things Not at all   Feeling down, depressed, irritable, or hopeless Not at all   Total Score PHQ 2 0       Learning Assessment:  Learning Assessment 5/6/2019   PRIMARY LEARNER Patient   PRIMARY LANGUAGE ENGLISH   LEARNER PREFERENCE PRIMARY DEMONSTRATION     -   ANSWERED BY patient   RELATIONSHIP SELF       Abuse Screening:  Abuse Screening Questionnaire 10/28/2019   Do you ever feel afraid of your partner? N   Are you in a relationship with someone who physically or mentally threatens you? N   Is it safe for you to go home? Y       Fall Risk  No flowsheet data found. Coordination of Care:  1. Have you been to the ER, urgent care clinic since your last visit? Hospitalized since your last visit? no    2. Have you seen or consulted any other health care providers outside of the [address] [place] [address] since your last visit? Include any pap smears or colon screening.  no

## 2019-10-28 NOTE — LETTER
Viji Khanna's Medication List 
 
Current Outpatient Medications on File Prior to Visit Medication Sig Dispense Refill  minocycline (DYNACIN) 100 mg tablet TAKE 1 TABLET BY MOUTH EVERY DAY  2  
 clindamycin-benzoyl peroxide (BENZACLIN) 1-5 % topical gel Apply  to affected area.  hydroCHLOROthiazide (HYDRODIURIL) 25 mg tablet Take 25 mg by mouth daily. Indications: visible water retention  cholecalciferol, vitamin D3, (VITAMIN D3) 2,000 unit tab Take 4,000 Units by mouth daily.  cyanocobalamin 1,000 mcg tablet Take 1,000 mcg by mouth daily.  apremilast 30 mg tab Take 30 mg by mouth two (2) times a day. Indications: psoriasis associated with arthritis  acyclovir (ZOVIRAX) 400 mg tablet Take one tid for 5 days prn cold sores  meclizine (ANTIVERT) 25 mg tablet 25 mg. Indications: sensation of spinning or whirling  triamcinolone (NASACORT AQ) 55 mcg nasal inhaler 1 [place] by Both Nostrils route as needed (Seasonal Allergies).  gabapentin (NEURONTIN) 300 mg capsule Take 1 Cap by mouth three (3) times daily (with meals). Indications: NEUROPATHIC PAIN 90 Cap 1 No current facility-administered medications on file prior to visit.

## 2019-10-28 NOTE — PROGRESS NOTES
Patient attended pre-operative education class. Appears to have a good understanding of the diet progression, food choices, and dietary/exercise habits for successful weight loss and nourishment after surgery. The class material included: post-op diet progression, including fluid needs, appropriate liquid choices, protein supplements, and behavior modifications recommended post-operatively. Patient previously received education booklet, during today's class the contents and appropriate sections were reviewed. Utilized education check points, class discussion, and teach back method to reinforce/ review appropriate dietary and exercise habits for optimal weight loss following surgery. Provided food list, example diet, and resources from class. Patient has contact information for any further questions or concerns. Will continue following post surgery for diet advancement.     Signed By: Lana España     October 28, 2019

## 2019-10-28 NOTE — PROGRESS NOTES
Sleeve Gastrectomy - History and Physical    Subjective: The patient is a 53 y.o. obese female with a Body mass index is 46.1 kg/m². .   she presents now to review their work up to date to see if they are a candidate for surgery and whether or not to proceed with the previously requested procedure. Bariatric comorbidities continue to include:   Patient Active Problem List   Diagnosis Code    Cervical herniated disc M50.20    Cervical spondylosis without myelopathy M47.812    Cervical neuritis M54.12    DDD (degenerative disc disease), cervical M50.30    Cervical spinal stenosis M48.02    HNP (herniated nucleus pulposus), cervical M50.20    S/P cervical spinal fusion Z98.1    Obesity, morbid ([address] E66.01    Psoriatic arthritis ([address] L40.50    Hypertension I10    HNP (herniated nucleus pulposus) with myelopathy, cervical M50.00    Urinary, incontinence, stress female N39.3    Migraines G43.909    Arthritis M19.90    Morbid obesity ([address] E66.01    Morbid obesity with BMI of 45.0-49.9, adult (HCC) E66.01, Z68.42    Fibroids D21.9    Tingling in extremities R20.2    Brain lesion G93.9    Hx of ventral hernia repair Z98.890, Z87.19       They have been generally well prior to this visit and have had no recent significant illnesses. The patient has had no gastrointestinal issues that would preclude them from proceeding with the surgery they have chosen. Galen Renee has recently tried a preoperative weight loss program  in addition to seeing a bariatric nutritionist preoperatively. We have discussed on at least one other occasion about the various types of surgical weight loss procedures and they have considered these options after our initial consultation. We have once again discussed these procedures in detail and they have now decided on a surgical procedure. They present today to discuss this and confirm that their evaluation pre operatively is acceptable to continue with surgery.     The patient desires laparoscopic sleeve gastrectomy for surgical weight loss. The patients goal weight is 143 lb. (this represents a BMI of 27)    These goals are consistent with expected outcomes of their desired operation. her Medical goals are resolution of these health issues. Patient Active Problem List    Diagnosis Date Noted    Brain lesion 2019    Hx of ventral hernia repair 2019    Arthritis 2019    Psoriatic arthritis ([address]     Hypertension     HNP (herniated nucleus pulposus) with myelopathy, cervical     Urinary, incontinence, stress female     Migraines     Morbid obesity ([address]     Morbid obesity with BMI of 45.0-49.9, adult ([address]     Fibroids     Tingling in extremities     Obesity, morbid ([address] 2019    S/P cervical spinal fusion 2017    Cervical spinal stenosis 2017    HNP (herniated nucleus pulposus), cervical 2017    Cervical herniated disc 2017    Cervical spondylosis without myelopathy 2017    Cervical neuritis 2017    DDD (degenerative disc disease), cervical 2017     Past Surgical History:   Procedure Laterality Date    HX  SECTION      x 3    HX CHOLECYSTECTOMY      HX HERNIA REPAIR      x2 umbilical with mesh    HX TUBAL LIGATION      NEUROLOGICAL PROCEDURE UNLISTED  2017    C3-4 Arthroplasty      Social History     Tobacco Use    Smoking status: Never Smoker    Smokeless tobacco: Never Used   Substance Use Topics    Alcohol use: No      Family History   Problem Relation Age of Onset    Hypertension Mother     Cancer Mother     Hypertension Father     Heart Disease Father     Stroke Sister     Diabetes Maternal Grandmother       Current Outpatient Medications   Medication Sig Dispense Refill    minocycline (DYNACIN) 100 mg tablet TAKE 1 TABLET BY MOUTH EVERY DAY  2    clindamycin-benzoyl peroxide (BENZACLIN) 1-5 % topical gel Apply  to affected area.       hydroCHLOROthiazide (HYDRODIURIL) 25 mg tablet Take 25 mg by mouth daily. Indications: visible water retention      cholecalciferol, vitamin D3, (VITAMIN D3) 2,000 unit tab Take 4,000 Units by mouth daily.  cyanocobalamin 1,000 mcg tablet Take 1,000 mcg by mouth daily.  apremilast 30 mg tab Take 30 mg by mouth two (2) times a day. Indications: psoriasis associated with arthritis      acyclovir (ZOVIRAX) 400 mg tablet Take one tid for 5 days prn cold sores      meclizine (ANTIVERT) 25 mg tablet 25 mg. Indications: sensation of spinning or whirling      triamcinolone (NASACORT AQ) 55 mcg nasal inhaler 1 [place] by Both Nostrils route as needed (Seasonal Allergies).  gabapentin (NEURONTIN) 300 mg capsule Take 1 Cap by mouth three (3) times daily (with meals). Indications: NEUROPATHIC PAIN 90 Cap 1    omeprazole (PRILOSEC) 20 mg capsule Take 1 Cap by mouth daily. 30 Cap 2    enoxaparin (LOVENOX) 40 mg/0.4 mL 0.4 mL by SubCUTAneous route every twelve (12) hours every twelve (12) hours for 7 days. Indications: Deep Vein Thrombosis Prevention 14 Syringe 0    HYDROmorphone (DILAUDID) 2 mg tablet Take 1 Tab by mouth every four to six (4-6) hours as needed for Pain for up to 3 days.  Max Daily Amount: 12 mg. 30 Tab 0     Allergies   Allergen Reactions    Fentanyl Nausea and Vomiting    Iodine Hives    Oxycodone Other (comments)    Penicillin G Other (comments)    Phenergan [Promethazine] Nausea and Vomiting        Review of Systems:     General - No history or complaints of unexpected fever, chills, or weight loss  Head/Neck - No history or complaints of headache, diplopia, dysphagia, hearing loss  Cardiac - No history or complaints of chest pain, palpitations, murmur, or shortness of breath  Pulmonary - No history or complaints of shortness of breath, productive cough, hemoptysis  Gastrointestinal - rare reflux,no  abdominal pain, obstipation/constipation or blood per rectum  Genitourinary - No history or complaints of hematuria/dysuria, stress urinary incontinence symptoms, or renal lithiasis  Musculoskeletal - mod joint pain in all joints,  no muscular weakness  Hematologic - No history or complaints of bleeding disorders,  No blood transfusions  Neurologic - No history or complaints of  migraine headaches, seizure activity, syncopal episodes, TIA or stroke  Integumentary - No history or complaints of rashes, abnormal nevi, skin cancer  Gynecological - Has heavy bleeding from fibroids    Objective:     Visit Vitals  /70 (BP 1 Location: Left arm, BP Patient Position: Sitting)   Pulse 85   Temp 97.5 °F (36.4 °C)   Resp 16   Ht 5' 1\" (1.549 m)   Wt 110.7 kg (244 lb)   LMP 10/10/2019   SpO2 100%   BMI 46.10 kg/m²       Physical Examination: General appearance - alert, well appearing, and in no distress  Mental status - alert, oriented to person, place, and time  Eyes - pupils equal and reactive, extraocular eye movements intact  Ears - bilateral TM's and external ear canals normal  Nose - normal and patent, no erythema, discharge or polyps  Mouth - mucous membranes moist, pharynx normal without lesions  Neck - supple, no significant adenopathy  Lymphatics - no palpable lymphadenopathy, no hepatosplenomegaly  Chest - clear to auscultation, no wheezes, rales or rhonchi, symmetric air entry  Heart - normal rate, regular rhythm, normal S1, S2, no murmurs, rubs, clicks or gallops  Abdomen - soft, nontender, nondistended, no masses or organomegaly  Back exam - full range of motion, no tenderness, palpable spasm or pain on motion  Neurological - alert, oriented, normal speech, no focal findings or movement disorder noted  Musculoskeletal - no joint tenderness, deformity or swelling  Extremities - peripheral pulses normal, no pedal edema, no clubbing or cyanosis  Skin - normal coloration and turgor, no rashes, no suspicious skin lesions noted    Labs / Preoperative Evaluation:        Recent Results (from the past 1008 hour(s))   EKG, 12 LEAD, INITIAL    Collection Time: 10/28/19 12:47 PM   Result Value Ref Range    Ventricular Rate 69 BPM    Atrial Rate 69 BPM    P-R Interval 162 ms    QRS Duration 86 ms    Q-T Interval 388 ms    QTC Calculation (Bezet) 415 ms    Calculated P Axis 56 degrees    Calculated R Axis 37 degrees    Calculated T Axis 26 degrees    Diagnosis       Normal sinus rhythm  Nonspecific T wave abnormality  Abnormal ECG  When compared with ECG of 10-MAR-2017 16:10,  No significant change was found     [facility] SPECIMEN COL    Collection Time: 10/28/19 12:54 PM   Result Value Ref Range    [facility]. Specimens collected/sent to [facility]. Please direct inquiries to ([phone]). TYPE & SCREEN    Collection Time: 10/28/19 12:54 PM   Result Value Ref Range    Crossmatch Expiration 11/08/2019     ABO/Rh(D) O POSITIVE     Antibody screen NEG        Assessment:     Morbid obesity with comorbidity    Plan:     laparoscopic sleeve gastrectomy    This is a 53 y.o. female with a BMI of Body mass index is 46.1 kg/m². and the weight-related co-morbidties as noted above. Viji meets the NIH criteria for bariatric surgery based upon the BMI of Body mass index is 46.1 kg/m². and multiple weight-related co-morbidties. Horacio Mcpherson has elected laparoscopic sleeve gastrectomy as her intervention of choice for treatment of morbid obestiy through surgical means secondary to its safety profile, rapid return to work  and decreases in operative risks over gastric bypass. In the office today, following Viji's history and physical examination, a 40 minute discussion regarding the anatomic alterations for the laparoscopic sleeve gastrectomy was undertaken. The dietary expectations and the patient  dependent factors for success were thoroughly discussed, to include the need for interval follow-up and long-term dietary changes associated with success.  The possible complications of the sleeve gastrectomy  were also discussed, to include;death, DVT/PE, staple line leak, bleeding, stricture formation, infection, nutritional deficiencies and sleeve dilation. Specific weight related outcomes for success were also discussed with an emphasis on careful and close follow-up with the first year and eating behavior modification as the baseline and cyclical hunger return. The patient expressed an understanding of the above factors, and her questions were answered in their entirety. In addition, the patient attended a 1.5 hour power point seminar regarding obesity, surgical weight loss including, adjustable gastric band, gastric bypass, and sleeve gastrectomy. This discussion contrasted the different surgical techniques, mechanisms of actions and expected outcomes, and surgical and medical risks associated with each procedure. During this seminar, there was a long question and answer session where each questions was answered until there were no additional questions. Today, the patient had all of her questions answered and the decision was made today that the patient's preoperative evaluation is acceptable for them  to proceed with bariatric surgery  choosing the sleeve gastrectomy as her surgical option. The patient understands the plan of action    There has been no change to their overall medical or surgical history and they have been on no steroids in any form. UGI was normal    There has been no exposure to nicotine or steroids in any form    Secondary Diagnoses:     DVT / Pulmonary Embolus Risk - The patient is at a higher risk for post operative DVT / pulmonary embolus secondary to their morbid obese status, relative sedentary lifestyle, and impending general anesthetic. We will plan to use anticoagulation therapy pre and post operative as well as  pneumatic compression devices and encourage ambulation once on the hospital nursing floor.   The need for possible at home anticoagulation therapy has also been discussed and any decision on this matter will be made during post operative evaluations. The patient understands that their efforts at ambulation are of vital importance to reduce the risk of this complication thus placing significant burden on them as to the prevention of such issues. Signs and symptoms of DVT / PE have been discussed with the patient and they have been instructed to call the office if any these occur in the \"at home\" post op phase. Hypertension - The patient has a clear understanding of how weight loss improves hypertension as a whole, but also they understand that there is a significant genetic component to this disease process. We will monitor the patients blood pressure while in the hospital and the plan would be to continue those medications postoperatively.  If a diuretic is being used we will stop them on discharge to prevent dehydration particularly with the sleeve gastrectomy and the gastric bypass procedures.  They will be instructed to monitor their blood pressure postoperatively while at home and notify their primary care physician in the event of any significantly high or uncharacteristic readings.     Weight Related Arthritis -The patient understands the benefits that weight loss surgery can have on their arthritis but also understands that weight loss is not a guaranteed cure and relief of symptoms is often dependent on the severity of the underlying disease. The patient also understands that traditional pharmaceutical treatments for this diagnosis are usually unavailable to post-operative weight loss patients due to the effects on the gastrointestinal tract. Any changes to the patients medication treatment will ultimately be made the patients PCP with input by our office.     Signed By: Isaiah Schneider MD     October 28, 2019

## 2019-10-28 NOTE — H&P (VIEW-ONLY)
Sleeve Gastrectomy - History and Physical 
 
Subjective: The patient is a 53 y.o. obese female with a Body mass index is 46.1 kg/m². .   she presents now to review their work up to date to see if they are a candidate for surgery and whether or not to proceed with the previously requested procedure. Bariatric comorbidities continue to include:  
Patient Active Problem List  
Diagnosis Code  Cervical herniated disc M50.20  Cervical spondylosis without myelopathy M47.812  Cervical neuritis M54.12  
 DDD (degenerative disc disease), cervical M50.30  Cervical spinal stenosis M48.02  
 HNP (herniated nucleus pulposus), cervical M50.20  S/P cervical spinal fusion Z98.1  Obesity, morbid ([address] E66.01  
 Psoriatic arthritis ([address] L40.50  Hypertension I10  
 HNP (herniated nucleus pulposus) with myelopathy, cervical M50.00  
 Urinary, incontinence, stress female N39.3  Migraines K84.650  Arthritis M19.90  Morbid obesity (HCC) E66.01  
 Morbid obesity with BMI of 45.0-49.9, adult (HCC) E66.01, Z68.42  
 Fibroids D21.9  Tingling in extremities R20.2  Brain lesion G93.9  
 Hx of ventral hernia repair Z98.890, Z87.19 They have been generally well prior to this visit and have had no recent significant illnesses. The patient has had no gastrointestinal issues that would preclude them from proceeding with the surgery they have chosen. Beto Stahl has recently tried a preoperative weight loss program  in addition to seeing a bariatric nutritionist preoperatively. We have discussed on at least one other occasion about the various types of surgical weight loss procedures and they have considered these options after our initial consultation. We have once again discussed these procedures in detail and they have now decided on a surgical procedure. They present today to discuss this and confirm that their evaluation pre operatively is acceptable to continue with surgery. The patient desires laparoscopic sleeve gastrectomy for surgical weight loss. The patients goal weight is 143 lb. (this represents a BMI of 27) These goals are consistent with expected outcomes of their desired operation. her Medical goals are resolution of these health issues. Patient Active Problem List  
 Diagnosis Date Noted  Brain lesion 2019  Hx of ventral hernia repair 2019  Arthritis 2019  Psoriatic arthritis ([address]  Hypertension  HNP (herniated nucleus pulposus) with myelopathy, cervical   
 Urinary, incontinence, stress female  Migraines  Morbid obesity ([address]  Morbid obesity with BMI of 45.0-49.9, adult ([address]  Fibroids  Tingling in extremities  Obesity, morbid ([address] 2019  S/P cervical spinal fusion 2017  Cervical spinal stenosis 2017  
 HNP (herniated nucleus pulposus), cervical 2017  Cervical herniated disc 2017  Cervical spondylosis without myelopathy 2017  Cervical neuritis 2017  DDD (degenerative disc disease), cervical 2017 Past Surgical History:  
Procedure Laterality Date  HX  SECTION    
 x 3  
 HX CHOLECYSTECTOMY  HX HERNIA REPAIR    
 x2 umbilical with mesh  HX TUBAL LIGATION    
 NEUROLOGICAL PROCEDURE UNLISTED  2017 C3-4 Arthroplasty Social History Tobacco Use  Smoking status: Never Smoker  Smokeless tobacco: Never Used Substance Use Topics  Alcohol use: No  
  
Family History Problem Relation Age of Onset  Hypertension Mother  Cancer Mother  Hypertension Father  Heart Disease Father  Stroke Sister  Diabetes Maternal Grandmother Current Outpatient Medications Medication Sig Dispense Refill  minocycline (DYNACIN) 100 mg tablet TAKE 1 TABLET BY MOUTH EVERY DAY  2  
 clindamycin-benzoyl peroxide (BENZACLIN) 1-5 % topical gel Apply  to affected area.  hydroCHLOROthiazide (HYDRODIURIL) 25 mg tablet Take 25 mg by mouth daily. Indications: visible water retention  cholecalciferol, vitamin D3, (VITAMIN D3) 2,000 unit tab Take 4,000 Units by mouth daily.  cyanocobalamin 1,000 mcg tablet Take 1,000 mcg by mouth daily.  apremilast 30 mg tab Take 30 mg by mouth two (2) times a day. Indications: psoriasis associated with arthritis  acyclovir (ZOVIRAX) 400 mg tablet Take one tid for 5 days prn cold sores  meclizine (ANTIVERT) 25 mg tablet 25 mg. Indications: sensation of spinning or whirling  triamcinolone (NASACORT AQ) 55 mcg nasal inhaler 1 [place] by Both Nostrils route as needed (Seasonal Allergies).  gabapentin (NEURONTIN) 300 mg capsule Take 1 Cap by mouth three (3) times daily (with meals). Indications: NEUROPATHIC PAIN 90 Cap 1  
 omeprazole (PRILOSEC) 20 mg capsule Take 1 Cap by mouth daily. 30 Cap 2  
 enoxaparin (LOVENOX) 40 mg/0.4 mL 0.4 mL by SubCUTAneous route every twelve (12) hours every twelve (12) hours for 7 days. Indications: Deep Vein Thrombosis Prevention 14 Syringe 0  
 HYDROmorphone (DILAUDID) 2 mg tablet Take 1 Tab by mouth every four to six (4-6) hours as needed for Pain for up to 3 days. Max Daily Amount: 12 mg. 30 Tab 0 Allergies Allergen Reactions  Fentanyl Nausea and Vomiting  Iodine Hives  Oxycodone Other (comments)  Penicillin G Other (comments)  Phenergan [Promethazine] Nausea and Vomiting Review of Systems:  
 
General - No history or complaints of unexpected fever, chills, or weight loss Head/Neck - No history or complaints of headache, diplopia, dysphagia, hearing loss Cardiac - No history or complaints of chest pain, palpitations, murmur, or shortness of breath Pulmonary - No history or complaints of shortness of breath, productive cough, hemoptysis Gastrointestinal - rare reflux,no  abdominal pain, obstipation/constipation or blood per rectum Genitourinary - No history or complaints of hematuria/dysuria, stress urinary incontinence symptoms, or renal lithiasis Musculoskeletal - mod joint pain in all joints,  no muscular weakness Hematologic - No history or complaints of bleeding disorders,  No blood transfusions Neurologic - No history or complaints of  migraine headaches, seizure activity, syncopal episodes, TIA or stroke Integumentary - No history or complaints of rashes, abnormal nevi, skin cancer Gynecological - Has heavy bleeding from fibroids Objective:  
 
Visit Vitals /70 (BP 1 Location: Left arm, BP Patient Position: Sitting) Pulse 85 Temp 97.5 °F (36.4 °C) Resp 16 Ht 5' 1\" (1.549 m) Wt 110.7 kg (244 lb) LMP 10/10/2019 SpO2 100% BMI 46.10 kg/m² Physical Examination: General appearance - alert, well appearing, and in no distress Mental status - alert, oriented to person, place, and time Eyes - pupils equal and reactive, extraocular eye movements intact Ears - bilateral TM's and external ear canals normal 
Nose - normal and patent, no erythema, discharge or polyps Mouth - mucous membranes moist, pharynx normal without lesions Neck - supple, no significant adenopathy Lymphatics - no palpable lymphadenopathy, no hepatosplenomegaly Chest - clear to auscultation, no wheezes, rales or rhonchi, symmetric air entry Heart - normal rate, regular rhythm, normal S1, S2, no murmurs, rubs, clicks or gallops Abdomen - soft, nontender, nondistended, no masses or organomegaly Back exam - full range of motion, no tenderness, palpable spasm or pain on motion Neurological - alert, oriented, normal speech, no focal findings or movement disorder noted Musculoskeletal - no joint tenderness, deformity or swelling Extremities - peripheral pulses normal, no pedal edema, no clubbing or cyanosis Skin - normal coloration and turgor, no rashes, no suspicious skin lesions noted Labs / Preoperative Evaluation: Recent Results (from the past 1008 hour(s)) EKG, 12 LEAD, INITIAL Collection Time: 10/28/19 12:47 PM  
Result Value Ref Range Ventricular Rate 69 BPM  
 Atrial Rate 69 BPM  
 P-R Interval 162 ms QRS Duration 86 ms  
 Q-T Interval 388 ms QTC Calculation (Bezet) 415 ms Calculated P Axis 56 degrees Calculated R Axis 37 degrees Calculated T Axis 26 degrees Diagnosis Normal sinus rhythm Nonspecific T wave abnormality Abnormal ECG When compared with ECG of 10-MAR-2017 16:10, No significant change was found [facility] SPECIMEN COL Collection Time: 10/28/19 12:54 PM  
Result Value Ref Range [facility] SPEC[facility]. Specimens collected/sent to [facility]. Please direct inquiries to ([phone]). TYPE & SCREEN Collection Time: 10/28/19 12:54 PM  
Result Value Ref Range Crossmatch Expiration 11/08/2019 ABO/Rh(D) O POSITIVE Antibody screen NEG Assessment: Morbid obesity with comorbidity Plan:  
 
laparoscopic sleeve gastrectomy This is a 53 y.o. female with a BMI of Body mass index is 46.1 kg/m². and the weight-related co-morbidties as noted above. Viji meets the NIH criteria for bariatric surgery based upon the BMI of Body mass index is 46.1 kg/m². and multiple weight-related co-morbidties. Keith Villatoro has elected laparoscopic sleeve gastrectomy as her intervention of choice for treatment of morbid obestiy through surgical means secondary to its safety profile, rapid return to work  and decreases in operative risks over gastric bypass. In the office today, following Viji's history and physical examination, a 40 minute discussion regarding the anatomic alterations for the laparoscopic sleeve gastrectomy was undertaken.  The dietary expectations and the patient  dependent factors for success were thoroughly discussed, to include the need for interval follow-up and long-term dietary changes associated with success. The possible complications of the sleeve gastrectomy  were also discussed, to include;death, DVT/PE, staple line leak, bleeding, stricture formation, infection, nutritional deficiencies and sleeve dilation. Specific weight related outcomes for success were also discussed with an emphasis on careful and close follow-up with the first year and eating behavior modification as the baseline and cyclical hunger return. The patient expressed an understanding of the above factors, and her questions were answered in their entirety. In addition, the patient attended a 1.5 hour power point seminar regarding obesity, surgical weight loss including, adjustable gastric band, gastric bypass, and sleeve gastrectomy. This discussion contrasted the different surgical techniques, mechanisms of actions and expected outcomes, and surgical and medical risks associated with each procedure. During this seminar, there was a long question and answer session where each questions was answered until there were no additional questions. Today, the patient had all of her questions answered and the decision was made today that the patient's preoperative evaluation is acceptable for them  to proceed with bariatric surgery  choosing the sleeve gastrectomy as her surgical option. The patient understands the plan of action There has been no change to their overall medical or surgical history and they have been on no steroids in any form. UGI was normal 
 
There has been no exposure to nicotine or steroids in any form Secondary Diagnoses:  
 
DVT / Pulmonary Embolus Risk - The patient is at a higher risk for post operative DVT / pulmonary embolus secondary to their morbid obese status, relative sedentary lifestyle, and impending general anesthetic.   We will plan to use anticoagulation therapy pre and post operative as well as  pneumatic compression devices and encourage ambulation once on the hospital nursing floor. The need for possible at home anticoagulation therapy has also been discussed and any decision on this matter will be made during post operative evaluations. The patient understands that their efforts at ambulation are of vital importance to reduce the risk of this complication thus placing significant burden on them as to the prevention of such issues. Signs and symptoms of DVT / PE have been discussed with the patient and they have been instructed to call the office if any these occur in the \"at home\" post op phase. Hypertension - The patient has a clear understanding of how weight loss improves hypertension as a whole, but also they understand that there is a significant genetic component to this disease process. We will monitor the patients blood pressure while in the hospital and the plan would be to continue those medications postoperatively.  If a diuretic is being used we will stop them on discharge to prevent dehydration particularly with the sleeve gastrectomy and the gastric bypass procedures.  They will be instructed to monitor their blood pressure postoperatively while at home and notify their primary care physician in the event of any significantly high or uncharacteristic readings. 
  
Weight Related Arthritis -The patient understands the benefits that weight loss surgery can have on their arthritis but also understands that weight loss is not a guaranteed cure and relief of symptoms is often dependent on the severity of the underlying disease. The patient also understands that traditional pharmaceutical treatments for this diagnosis are usually unavailable to post-operative weight loss patients due to the effects on the gastrointestinal tract. Any changes to the patients medication treatment will ultimately be made the patients PCP with input by our office. Signed By: Pawan Farias MD   
 October 28, 2019

## 2019-10-29 LAB
ALBUMIN SERPL-MCNC: 4 G/DL (ref 3.5–5.5)
ALBUMIN/GLOB SERPL: 1.3 {RATIO} (ref 1.2–2.2)
ALP SERPL-CCNC: 92 IU/L (ref 39–117)
ALT SERPL-CCNC: 8 IU/L (ref 0–32)
AST SERPL-CCNC: 13 IU/L (ref 0–40)
ATRIAL RATE: 69 BPM
B-HCG SERPL QL: NEGATIVE MIU/ML
BASOPHILS # BLD AUTO: 0.1 X10E3/UL (ref 0–0.2)
BASOPHILS NFR BLD AUTO: 1 %
BILIRUB SERPL-MCNC: <0.2 MG/DL (ref 0–1.2)
BUN SERPL-MCNC: 13 MG/DL (ref 6–24)
BUN/CREAT SERPL: 20 (ref 9–23)
CALCIUM SERPL-MCNC: 9.3 MG/DL (ref 8.7–10.2)
CALCULATED P AXIS, ECG09: 56 DEGREES
CALCULATED R AXIS, ECG10: 37 DEGREES
CALCULATED T AXIS, ECG11: 26 DEGREES
CHLORIDE SERPL-SCNC: 101 MMOL/L (ref 96–106)
CO2 SERPL-SCNC: 21 MMOL/L (ref 20–29)
CREAT SERPL-MCNC: 0.64 MG/DL (ref 0.57–1)
DIAGNOSIS, 93000: NORMAL
EOSINOPHIL # BLD AUTO: 0.2 X10E3/UL (ref 0–0.4)
EOSINOPHIL NFR BLD AUTO: 3 %
ERYTHROCYTE [DISTWIDTH] IN BLOOD BY AUTOMATED COUNT: 14.2 % (ref 12.3–15.4)
GLOBULIN SER CALC-MCNC: 3.1 G/DL (ref 1.5–4.5)
GLUCOSE SERPL-MCNC: 85 MG/DL (ref 65–99)
HCT VFR BLD AUTO: 37.1 % (ref 34–46.6)
HGB BLD-MCNC: 11.2 G/DL (ref 11.1–15.9)
IMM GRANULOCYTES # BLD AUTO: 0 X10E3/UL (ref 0–0.1)
IMM GRANULOCYTES NFR BLD AUTO: 0 %
LYMPHOCYTES # BLD AUTO: 3.1 X10E3/UL (ref 0.7–3.1)
LYMPHOCYTES NFR BLD AUTO: 37 %
MCH RBC QN AUTO: 23 PG (ref 26.6–33)
MCHC RBC AUTO-ENTMCNC: 30.2 G/DL (ref 31.5–35.7)
MCV RBC AUTO: 76 FL (ref 79–97)
MONOCYTES # BLD AUTO: 0.5 X10E3/UL (ref 0.1–0.9)
MONOCYTES NFR BLD AUTO: 7 %
NEUTROPHILS # BLD AUTO: 4.3 X10E3/UL (ref 1.4–7)
NEUTROPHILS NFR BLD AUTO: 52 %
P-R INTERVAL, ECG05: 162 MS
PLATELET # BLD AUTO: 307 X10E3/UL (ref 150–450)
POTASSIUM SERPL-SCNC: 3.7 MMOL/L (ref 3.5–5.2)
PROT SERPL-MCNC: 7.1 G/DL (ref 6–8.5)
Q-T INTERVAL, ECG07: 388 MS
QRS DURATION, ECG06: 86 MS
QTC CALCULATION (BEZET), ECG08: 415 MS
RBC # BLD AUTO: 4.86 X10E6/UL (ref 3.77–5.28)
SODIUM SERPL-SCNC: 138 MMOL/L (ref 134–144)
VENTRICULAR RATE, ECG03: 69 BPM
WBC # BLD AUTO: 8.2 X10E3/UL (ref 3.4–10.8)

## 2019-11-04 ENCOUNTER — ANESTHESIA EVENT (OUTPATIENT)
Dept: SURGERY | Age: 49
DRG: 621 | End: 2019-11-04
Payer: COMMERCIAL

## 2019-11-05 ENCOUNTER — NURSE NAVIGATOR (OUTPATIENT)
Dept: SURGERY | Age: 49
End: 2019-11-05

## 2019-11-05 ENCOUNTER — ANESTHESIA (OUTPATIENT)
Dept: SURGERY | Age: 49
DRG: 621 | End: 2019-11-05
Payer: COMMERCIAL

## 2019-11-05 ENCOUNTER — HOSPITAL ENCOUNTER (INPATIENT)
Age: 49
LOS: 1 days | Discharge: HOME | DRG: 621 | End: 2019-11-06
Attending: SPECIALIST | Admitting: SPECIALIST
Payer: COMMERCIAL

## 2019-11-05 DIAGNOSIS — Z98.890 POSTOPERATIVE NAUSEA: Primary | ICD-10-CM

## 2019-11-05 DIAGNOSIS — R11.0 POSTOPERATIVE NAUSEA: Primary | ICD-10-CM

## 2019-11-05 PROBLEM — E66.01 MORBID OBESITY WITH BMI OF 40.0-44.9, ADULT (HCC): Status: ACTIVE | Noted: 2019-11-05

## 2019-11-05 LAB — HCG UR QL: NEGATIVE

## 2019-11-05 PROCEDURE — 77030008608 HC TRCR ENDOSC SMTH AMR -B: Performed by: SPECIALIST

## 2019-11-05 PROCEDURE — 77030020782 HC GWN BAIR PAWS FLX 3M -B: Performed by: SPECIALIST

## 2019-11-05 PROCEDURE — 74011000250 HC RX REV CODE- 250: Performed by: ANESTHESIOLOGY

## 2019-11-05 PROCEDURE — 74011250636 HC RX REV CODE- 250/636: Performed by: SPECIALIST

## 2019-11-05 PROCEDURE — 88313 SPECIAL STAINS GROUP 2: CPT

## 2019-11-05 PROCEDURE — 77030008606 HC TRCR ENDOSC KII AMR -B: Performed by: SPECIALIST

## 2019-11-05 PROCEDURE — 77030003578 HC NDL INSUF VERES AMR -B: Performed by: SPECIALIST

## 2019-11-05 PROCEDURE — 77030006643: Performed by: ANESTHESIOLOGY

## 2019-11-05 PROCEDURE — 88307 TISSUE EXAM BY PATHOLOGIST: CPT

## 2019-11-05 PROCEDURE — 77030008518 HC TBNG INSUF ENDO STRY -B: Performed by: SPECIALIST

## 2019-11-05 PROCEDURE — 77030040361 HC SLV COMPR DVT MDII -B: Performed by: SPECIALIST

## 2019-11-05 PROCEDURE — 74011250636 HC RX REV CODE- 250/636: Performed by: ANESTHESIOLOGY

## 2019-11-05 PROCEDURE — 77030010515 HC APPL ENDOCLP LIG J&J -B: Performed by: SPECIALIST

## 2019-11-05 PROCEDURE — 81025 URINE PREGNANCY TEST: CPT

## 2019-11-05 PROCEDURE — 0DB64Z3 EXCISION OF STOMACH, PERCUTANEOUS ENDOSCOPIC APPROACH, VERTICAL: ICD-10-PCS | Performed by: SPECIALIST

## 2019-11-05 PROCEDURE — 77030020829: Performed by: SPECIALIST

## 2019-11-05 PROCEDURE — 77030034154 HC SHR COAG HARM ACE J&J -F: Performed by: SPECIALIST

## 2019-11-05 PROCEDURE — 77030008683 HC TU ET CUF COVD -A: Performed by: ANESTHESIOLOGY

## 2019-11-05 PROCEDURE — 77030016151 HC PROTCTR LNS DFOG COVD -B: Performed by: SPECIALIST

## 2019-11-05 PROCEDURE — 76210000006 HC OR PH I REC 0.5 TO 1 HR: Performed by: SPECIALIST

## 2019-11-05 PROCEDURE — 77030014008 HC SPNG HEMSTAT J&J -C: Performed by: SPECIALIST

## 2019-11-05 PROCEDURE — 0DB68ZX EXCISION OF STOMACH, VIA NATURAL OR ARTIFICIAL OPENING ENDOSCOPIC, DIAGNOSTIC: ICD-10-PCS | Performed by: SPECIALIST

## 2019-11-05 PROCEDURE — 77030020255 HC SOL INJ LR 1000ML BG: Performed by: SPECIALIST

## 2019-11-05 PROCEDURE — 65270000029 HC RM PRIVATE

## 2019-11-05 PROCEDURE — 77030009426 HC FCPS BIOP ENDOSC BSC -B: Performed by: SPECIALIST

## 2019-11-05 PROCEDURE — 77030012893: Performed by: SPECIALIST

## 2019-11-05 PROCEDURE — 77030002916 HC SUT ETHLN J&J -A: Performed by: SPECIALIST

## 2019-11-05 PROCEDURE — 77030027876 HC STPLR ENDOSC FLX PWR J&J -G1: Performed by: SPECIALIST

## 2019-11-05 PROCEDURE — 77030027138 HC INCENT SPIROMETER -A

## 2019-11-05 PROCEDURE — 0BQT4ZZ REPAIR DIAPHRAGM, PERCUTANEOUS ENDOSCOPIC APPROACH: ICD-10-PCS | Performed by: SPECIALIST

## 2019-11-05 PROCEDURE — 77030010030: Performed by: SPECIALIST

## 2019-11-05 PROCEDURE — 0DNW4ZZ RELEASE PERITONEUM, PERCUTANEOUS ENDOSCOPIC APPROACH: ICD-10-PCS | Performed by: SPECIALIST

## 2019-11-05 PROCEDURE — 77030002966 HC SUT PDS J&J -A: Performed by: SPECIALIST

## 2019-11-05 PROCEDURE — 77030002912 HC SUT ETHBND J&J -A: Performed by: SPECIALIST

## 2019-11-05 PROCEDURE — 77030022585 HC SEAL FBRN EVICEL J&J -F: Performed by: SPECIALIST

## 2019-11-05 PROCEDURE — 77030012407 HC DRN WND BARD -B: Performed by: SPECIALIST

## 2019-11-05 PROCEDURE — 76010000131 HC OR TIME 2 TO 2.5 HR: Performed by: SPECIALIST

## 2019-11-05 PROCEDURE — 88305 TISSUE EXAM BY PATHOLOGIST: CPT

## 2019-11-05 PROCEDURE — 77030034029 HC SLV GASTRCTMY CAL SYS DISP BOEH -C: Performed by: SPECIALIST

## 2019-11-05 PROCEDURE — 77030033200 HC PRT CLSR CRTR THOMP COOP -C: Performed by: SPECIALIST

## 2019-11-05 PROCEDURE — 77030018836 HC SOL IRR NACL ICUM -A: Performed by: SPECIALIST

## 2019-11-05 PROCEDURE — 77030009968 HC RELD STPLR ENDOSC J&J -D: Performed by: SPECIALIST

## 2019-11-05 PROCEDURE — 0FB04ZX EXCISION OF LIVER, PERCUTANEOUS ENDOSCOPIC APPROACH, DIAGNOSTIC: ICD-10-PCS | Performed by: SPECIALIST

## 2019-11-05 PROCEDURE — 74011000250 HC RX REV CODE- 250: Performed by: SPECIALIST

## 2019-11-05 PROCEDURE — 74011250637 HC RX REV CODE- 250/637: Performed by: SPECIALIST

## 2019-11-05 PROCEDURE — 77030002933 HC SUT MCRYL J&J -A: Performed by: SPECIALIST

## 2019-11-05 PROCEDURE — 76060000035 HC ANESTHESIA 2 TO 2.5 HR: Performed by: SPECIALIST

## 2019-11-05 PROCEDURE — 77030014090 HC TRCR OPT AMR -B: Performed by: SPECIALIST

## 2019-11-05 RX ORDER — NALOXONE HYDROCHLORIDE 0.4 MG/ML
0.1 INJECTION, SOLUTION INTRAMUSCULAR; INTRAVENOUS; SUBCUTANEOUS
Status: DISCONTINUED | OUTPATIENT
Start: 2019-11-05 | End: 2019-11-05 | Stop reason: HOSPADM

## 2019-11-05 RX ORDER — NYSTATIN 100000 [USP'U]/ML
500000 SUSPENSION ORAL
Status: COMPLETED | OUTPATIENT
Start: 2019-11-05 | End: 2019-11-05

## 2019-11-05 RX ORDER — GLYCOPYRROLATE 0.2 MG/ML
INJECTION INTRAMUSCULAR; INTRAVENOUS AS NEEDED
Status: DISCONTINUED | OUTPATIENT
Start: 2019-11-05 | End: 2019-11-05 | Stop reason: HOSPADM

## 2019-11-05 RX ORDER — CIPROFLOXACIN 2 MG/ML
400 INJECTION, SOLUTION INTRAVENOUS ONCE
Status: COMPLETED | OUTPATIENT
Start: 2019-11-05 | End: 2019-11-05

## 2019-11-05 RX ORDER — SODIUM CHLORIDE, SODIUM LACTATE, POTASSIUM CHLORIDE, CALCIUM CHLORIDE 600; 310; 30; 20 MG/100ML; MG/100ML; MG/100ML; MG/100ML
50 INJECTION, SOLUTION INTRAVENOUS CONTINUOUS
Status: DISCONTINUED | OUTPATIENT
Start: 2019-11-05 | End: 2019-11-05 | Stop reason: HOSPADM

## 2019-11-05 RX ORDER — BUPIVACAINE HYDROCHLORIDE AND EPINEPHRINE 2.5; 5 MG/ML; UG/ML
INJECTION, SOLUTION EPIDURAL; INFILTRATION; INTRACAUDAL; PERINEURAL AS NEEDED
Status: DISCONTINUED | OUTPATIENT
Start: 2019-11-05 | End: 2019-11-05 | Stop reason: HOSPADM

## 2019-11-05 RX ORDER — MIDAZOLAM HYDROCHLORIDE 1 MG/ML
INJECTION, SOLUTION INTRAMUSCULAR; INTRAVENOUS AS NEEDED
Status: DISCONTINUED | OUTPATIENT
Start: 2019-11-05 | End: 2019-11-05 | Stop reason: HOSPADM

## 2019-11-05 RX ORDER — ONDANSETRON 2 MG/ML
4 INJECTION INTRAMUSCULAR; INTRAVENOUS ONCE
Status: COMPLETED | OUTPATIENT
Start: 2019-11-05 | End: 2019-11-05

## 2019-11-05 RX ORDER — ACETAMINOPHEN 10 MG/ML
1000 INJECTION, SOLUTION INTRAVENOUS ONCE
Status: COMPLETED | OUTPATIENT
Start: 2019-11-05 | End: 2019-11-05

## 2019-11-05 RX ORDER — NEOSTIGMINE METHYLSULFATE 1 MG/ML
INJECTION, SOLUTION INTRAVENOUS AS NEEDED
Status: DISCONTINUED | OUTPATIENT
Start: 2019-11-05 | End: 2019-11-05 | Stop reason: HOSPADM

## 2019-11-05 RX ORDER — ONDANSETRON 2 MG/ML
4 INJECTION INTRAMUSCULAR; INTRAVENOUS
Status: DISCONTINUED | OUTPATIENT
Start: 2019-11-05 | End: 2019-11-06 | Stop reason: HOSPADM

## 2019-11-05 RX ORDER — SODIUM CHLORIDE, SODIUM LACTATE, POTASSIUM CHLORIDE, CALCIUM CHLORIDE 600; 310; 30; 20 MG/100ML; MG/100ML; MG/100ML; MG/100ML
125 INJECTION, SOLUTION INTRAVENOUS CONTINUOUS
Status: DISCONTINUED | OUTPATIENT
Start: 2019-11-05 | End: 2019-11-05

## 2019-11-05 RX ORDER — PROPOFOL 10 MG/ML
INJECTION, EMULSION INTRAVENOUS AS NEEDED
Status: DISCONTINUED | OUTPATIENT
Start: 2019-11-05 | End: 2019-11-05 | Stop reason: HOSPADM

## 2019-11-05 RX ORDER — KETAMINE HYDROCHLORIDE 10 MG/ML
INJECTION, SOLUTION INTRAMUSCULAR; INTRAVENOUS AS NEEDED
Status: DISCONTINUED | OUTPATIENT
Start: 2019-11-05 | End: 2019-11-05 | Stop reason: HOSPADM

## 2019-11-05 RX ORDER — ACETAMINOPHEN 10 MG/ML
1000 INJECTION, SOLUTION INTRAVENOUS EVERY 6 HOURS
Status: COMPLETED | OUTPATIENT
Start: 2019-11-05 | End: 2019-11-06

## 2019-11-05 RX ORDER — ENOXAPARIN SODIUM 100 MG/ML
40 INJECTION SUBCUTANEOUS ONCE
Status: COMPLETED | OUTPATIENT
Start: 2019-11-05 | End: 2019-11-05

## 2019-11-05 RX ORDER — ENOXAPARIN SODIUM 100 MG/ML
40 INJECTION SUBCUTANEOUS EVERY 12 HOURS
Status: DISCONTINUED | OUTPATIENT
Start: 2019-11-05 | End: 2019-11-06 | Stop reason: HOSPADM

## 2019-11-05 RX ORDER — MORPHINE SULFATE 4 MG/ML
6 INJECTION INTRAVENOUS
Status: DISCONTINUED | OUTPATIENT
Start: 2019-11-05 | End: 2019-11-06

## 2019-11-05 RX ORDER — HYDROMORPHONE HYDROCHLORIDE 2 MG/ML
0.5 INJECTION, SOLUTION INTRAMUSCULAR; INTRAVENOUS; SUBCUTANEOUS
Status: DISCONTINUED | OUTPATIENT
Start: 2019-11-05 | End: 2019-11-05 | Stop reason: HOSPADM

## 2019-11-05 RX ORDER — MAGNESIUM SULFATE 100 %
4 CRYSTALS MISCELLANEOUS AS NEEDED
Status: DISCONTINUED | OUTPATIENT
Start: 2019-11-05 | End: 2019-11-05 | Stop reason: HOSPADM

## 2019-11-05 RX ORDER — FENTANYL CITRATE 50 UG/ML
25 INJECTION, SOLUTION INTRAMUSCULAR; INTRAVENOUS
Status: DISCONTINUED | OUTPATIENT
Start: 2019-11-05 | End: 2019-11-05 | Stop reason: HOSPADM

## 2019-11-05 RX ORDER — LIDOCAINE HYDROCHLORIDE 20 MG/ML
INJECTION, SOLUTION EPIDURAL; INFILTRATION; INTRACAUDAL; PERINEURAL AS NEEDED
Status: DISCONTINUED | OUTPATIENT
Start: 2019-11-05 | End: 2019-11-05 | Stop reason: HOSPADM

## 2019-11-05 RX ORDER — KETOROLAC TROMETHAMINE 15 MG/ML
15 INJECTION, SOLUTION INTRAMUSCULAR; INTRAVENOUS EVERY 6 HOURS
Status: DISCONTINUED | OUTPATIENT
Start: 2019-11-05 | End: 2019-11-06 | Stop reason: HOSPADM

## 2019-11-05 RX ORDER — FENTANYL CITRATE 50 UG/ML
INJECTION, SOLUTION INTRAMUSCULAR; INTRAVENOUS AS NEEDED
Status: DISCONTINUED | OUTPATIENT
Start: 2019-11-05 | End: 2019-11-05 | Stop reason: HOSPADM

## 2019-11-05 RX ORDER — FAMOTIDINE 20 MG/50ML
20 INJECTION, SOLUTION INTRAVENOUS ONCE
Status: DISCONTINUED | OUTPATIENT
Start: 2019-11-05 | End: 2019-11-05 | Stop reason: SDUPTHER

## 2019-11-05 RX ORDER — ONDANSETRON 2 MG/ML
INJECTION INTRAMUSCULAR; INTRAVENOUS AS NEEDED
Status: DISCONTINUED | OUTPATIENT
Start: 2019-11-05 | End: 2019-11-05 | Stop reason: HOSPADM

## 2019-11-05 RX ORDER — SODIUM CHLORIDE, SODIUM LACTATE, POTASSIUM CHLORIDE, CALCIUM CHLORIDE 600; 310; 30; 20 MG/100ML; MG/100ML; MG/100ML; MG/100ML
150 INJECTION, SOLUTION INTRAVENOUS CONTINUOUS
Status: DISCONTINUED | OUTPATIENT
Start: 2019-11-05 | End: 2019-11-06 | Stop reason: HOSPADM

## 2019-11-05 RX ORDER — NALOXONE HYDROCHLORIDE 0.4 MG/ML
0.4 INJECTION, SOLUTION INTRAMUSCULAR; INTRAVENOUS; SUBCUTANEOUS AS NEEDED
Status: DISCONTINUED | OUTPATIENT
Start: 2019-11-05 | End: 2019-11-06 | Stop reason: HOSPADM

## 2019-11-05 RX ORDER — CIPROFLOXACIN 2 MG/ML
400 INJECTION, SOLUTION INTRAVENOUS EVERY 12 HOURS
Status: COMPLETED | OUTPATIENT
Start: 2019-11-05 | End: 2019-11-06

## 2019-11-05 RX ORDER — HYDROMORPHONE HYDROCHLORIDE 1 MG/ML
INJECTION, SOLUTION INTRAMUSCULAR; INTRAVENOUS; SUBCUTANEOUS AS NEEDED
Status: DISCONTINUED | OUTPATIENT
Start: 2019-11-05 | End: 2019-11-05 | Stop reason: HOSPADM

## 2019-11-05 RX ORDER — ROCURONIUM BROMIDE 10 MG/ML
INJECTION, SOLUTION INTRAVENOUS AS NEEDED
Status: DISCONTINUED | OUTPATIENT
Start: 2019-11-05 | End: 2019-11-05 | Stop reason: HOSPADM

## 2019-11-05 RX ORDER — KETOROLAC TROMETHAMINE 15 MG/ML
INJECTION, SOLUTION INTRAMUSCULAR; INTRAVENOUS AS NEEDED
Status: DISCONTINUED | OUTPATIENT
Start: 2019-11-05 | End: 2019-11-05 | Stop reason: HOSPADM

## 2019-11-05 RX ORDER — INSULIN LISPRO 100 [IU]/ML
INJECTION, SOLUTION INTRAVENOUS; SUBCUTANEOUS ONCE
Status: DISCONTINUED | OUTPATIENT
Start: 2019-11-05 | End: 2019-11-05 | Stop reason: HOSPADM

## 2019-11-05 RX ADMIN — PHENYLEPHRINE HYDROCHLORIDE 100 MCG: 10 INJECTION INTRAVENOUS at 10:59

## 2019-11-05 RX ADMIN — ONDANSETRON HYDROCHLORIDE 4 MG: 2 INJECTION INTRAMUSCULAR; INTRAVENOUS at 12:17

## 2019-11-05 RX ADMIN — ENOXAPARIN SODIUM 40 MG: 40 INJECTION, SOLUTION INTRAVENOUS; SUBCUTANEOUS at 09:15

## 2019-11-05 RX ADMIN — ONDANSETRON 4 MG: 2 INJECTION INTRAMUSCULAR; INTRAVENOUS at 18:32

## 2019-11-05 RX ADMIN — KETOROLAC TROMETHAMINE 30 MG: 15 INJECTION, SOLUTION INTRAMUSCULAR; INTRAVENOUS at 12:18

## 2019-11-05 RX ADMIN — ENOXAPARIN SODIUM 40 MG: 40 INJECTION, SOLUTION INTRAVENOUS; SUBCUTANEOUS at 23:00

## 2019-11-05 RX ADMIN — NYSTATIN 500000 UNITS: 100000 SUSPENSION ORAL at 09:15

## 2019-11-05 RX ADMIN — FAMOTIDINE 20 MG: 10 INJECTION, SOLUTION INTRAVENOUS at 09:15

## 2019-11-05 RX ADMIN — ACETAMINOPHEN 1000 MG: 10 INJECTION, SOLUTION INTRAVENOUS at 16:54

## 2019-11-05 RX ADMIN — KETOROLAC TROMETHAMINE 15 MG: 15 INJECTION, SOLUTION INTRAMUSCULAR; INTRAVENOUS at 23:32

## 2019-11-05 RX ADMIN — PROPOFOL 200 MG: 10 INJECTION, EMULSION INTRAVENOUS at 10:31

## 2019-11-05 RX ADMIN — HYDROMORPHONE HYDROCHLORIDE 0.5 MG: 2 INJECTION INTRAMUSCULAR; INTRAVENOUS; SUBCUTANEOUS at 13:45

## 2019-11-05 RX ADMIN — FENTANYL CITRATE 100 MCG: 50 INJECTION, SOLUTION INTRAMUSCULAR; INTRAVENOUS at 10:31

## 2019-11-05 RX ADMIN — ONDANSETRON 4 MG: 2 INJECTION INTRAMUSCULAR; INTRAVENOUS at 16:14

## 2019-11-05 RX ADMIN — HYDROMORPHONE HYDROCHLORIDE 0.5 MG: 2 INJECTION INTRAMUSCULAR; INTRAVENOUS; SUBCUTANEOUS at 14:30

## 2019-11-05 RX ADMIN — HYDROMORPHONE HYDROCHLORIDE 0.5 MG: 2 INJECTION INTRAMUSCULAR; INTRAVENOUS; SUBCUTANEOUS at 14:41

## 2019-11-05 RX ADMIN — MORPHINE SULFATE 6 MG: 4 INJECTION INTRAVENOUS at 20:18

## 2019-11-05 RX ADMIN — GLYCOPYRROLATE 0.4 MG: 0.2 INJECTION INTRAMUSCULAR; INTRAVENOUS at 12:16

## 2019-11-05 RX ADMIN — ACETAMINOPHEN 1000 MG: 10 INJECTION, SOLUTION INTRAVENOUS at 22:55

## 2019-11-05 RX ADMIN — PROMETHAZINE HYDROCHLORIDE 12.5 MG: 25 INJECTION INTRAMUSCULAR; INTRAVENOUS at 22:50

## 2019-11-05 RX ADMIN — SODIUM CHLORIDE, SODIUM LACTATE, POTASSIUM CHLORIDE, AND CALCIUM CHLORIDE: 600; 310; 30; 20 INJECTION, SOLUTION INTRAVENOUS at 11:09

## 2019-11-05 RX ADMIN — CIPROFLOXACIN 400 MG: 2 INJECTION, SOLUTION INTRAVENOUS at 22:56

## 2019-11-05 RX ADMIN — KETOROLAC TROMETHAMINE 15 MG: 15 INJECTION, SOLUTION INTRAMUSCULAR; INTRAVENOUS at 18:32

## 2019-11-05 RX ADMIN — ACETAMINOPHEN 1000 MG: 10 INJECTION, SOLUTION INTRAVENOUS at 11:03

## 2019-11-05 RX ADMIN — PHENYLEPHRINE HYDROCHLORIDE 100 MCG: 10 INJECTION INTRAVENOUS at 10:41

## 2019-11-05 RX ADMIN — GLYCOPYRROLATE 0.3 MG: 0.2 INJECTION INTRAMUSCULAR; INTRAVENOUS at 11:07

## 2019-11-05 RX ADMIN — Medication 50 MG: at 10:31

## 2019-11-05 RX ADMIN — SODIUM CHLORIDE, SODIUM LACTATE, POTASSIUM CHLORIDE, AND CALCIUM CHLORIDE 150 ML/HR: 600; 310; 30; 20 INJECTION, SOLUTION INTRAVENOUS at 16:59

## 2019-11-05 RX ADMIN — LIDOCAINE HYDROCHLORIDE 60 MG: 20 INJECTION, SOLUTION EPIDURAL; INFILTRATION; INTRACAUDAL; PERINEURAL at 10:31

## 2019-11-05 RX ADMIN — KETAMINE HYDROCHLORIDE 30 MG: 10 INJECTION, SOLUTION INTRAMUSCULAR; INTRAVENOUS at 10:31

## 2019-11-05 RX ADMIN — CIPROFLOXACIN 400 MG: 2 INJECTION, SOLUTION INTRAVENOUS at 10:49

## 2019-11-05 RX ADMIN — HYDROMORPHONE HYDROCHLORIDE 1 MG: 1 INJECTION, SOLUTION INTRAMUSCULAR; INTRAVENOUS; SUBCUTANEOUS at 11:18

## 2019-11-05 RX ADMIN — SODIUM CHLORIDE, SODIUM LACTATE, POTASSIUM CHLORIDE, AND CALCIUM CHLORIDE 150 ML/HR: 600; 310; 30; 20 INJECTION, SOLUTION INTRAVENOUS at 23:36

## 2019-11-05 RX ADMIN — KETAMINE HYDROCHLORIDE 20 MG: 10 INJECTION, SOLUTION INTRAMUSCULAR; INTRAVENOUS at 11:00

## 2019-11-05 RX ADMIN — Medication 2 MG: at 12:16

## 2019-11-05 RX ADMIN — MIDAZOLAM 2 MG: 1 INJECTION INTRAMUSCULAR; INTRAVENOUS at 10:23

## 2019-11-05 RX ADMIN — SODIUM CHLORIDE, SODIUM LACTATE, POTASSIUM CHLORIDE, AND CALCIUM CHLORIDE 125 ML/HR: 600; 310; 30; 20 INJECTION, SOLUTION INTRAVENOUS at 09:00

## 2019-11-05 RX ADMIN — Medication 20 MG: at 11:10

## 2019-11-05 NOTE — PERIOP NOTES
TRANSFER - OUT REPORT:    Verbal report given to [facility] [employer] [employer] on Porter Jones  being transferred to [address] [place] [address] for routine post - op       Report consisted of patients Situation, Background, Assessment and   Recommendations(SBAR). Information from the following report(s) SBAR and MAR was reviewed with the receiving nurse. Opportunity for questions and clarification was provided.       Patient transported with:   O2 @ 2 liters  Registered Nurse  [facility]

## 2019-11-05 NOTE — ANESTHESIA POSTPROCEDURE EVALUATION
Post-Anesthesia Evaluation and Assessment    Cardiovascular Function/Vital Signs  Visit Vitals  /82   Pulse 79   Temp 36.3 °C (97.3 °F)   Resp 22   Ht 5' 1\" (1.549 m)   Wt 110.2 kg (243 lb)   SpO2 99%   BMI 45.91 kg/m²       Patient is status post Procedure(s):  LYSIS OF ADHESIONS FOR 30 MINUTES, LAPAROSCOPIC GASTRIC SLEEVE WITH LIVER BIOPSY AND INTRAOPERATIVE ENDOSCOPY, DIAPHRAGMATIC HERNIA REPAIR. Nausea/Vomiting: Controlled. Postoperative hydration reviewed and adequate. Pain:  Pain Scale 1: FLACC (11/05/19 1233)  Pain Intensity 1: 0 (11/05/19 1233)   Managed. Neurological Status:   Neuro (WDL): Exceptions to WDL (11/05/19 1233)   At baseline. Mental Status and Level of Consciousness: Baseline and stable. Pulmonary Status:   O2 Device: Non-rebreather mask (11/05/19 1233)   Adequate oxygenation and airway patent. Complications related to anesthesia: None    Post-anesthesia assessment completed. No concerns. Patient has met all discharge requirements.     Signed By: Candelario Huggins MD

## 2019-11-05 NOTE — PROGRESS NOTES
Summary -- Pt comfortable upon arrival to unit and throughout shift. CC were stiff back, responsive to heat pack and nausea with one small episode clear emesis, responsive to zofran. Has ambulated (in room) and voided once since surgery. Minimal brown drainage to HUNTER site. Working well with IS. Supportive spouse at bedside this afternoon. Engaged in care. Patient Vitals for the past 4 hrs:   Temp Pulse Resp BP SpO2   11/05/19 1805 97.7 °F (36.5 °C) 84 17 123/73 98 %   11/05/19 1730 97.6 °F (36.4 °C) 92 16 127/81 100 %       Bedside shift change report given to Dany Ralph RN (oncoming nurse) by Tawana Renteria RN (offgoing nurse). Report included the following information SBAR, Kardex, OR Summary, Intake/Output, MAR and Recent Results.

## 2019-11-05 NOTE — PROGRESS NOTES
Bedside report to receiving nurse Mary Beth Aponte RN, current vital signs noted below. Dressing dry and intact. Family in waiting room. No further questions from receiving nurse.       Visit Vitals  /69   Pulse 78   Temp 97.8 °F (36.6 °C)   Resp 17   Ht 5' 1\" (1.549 m)   Wt 110.2 kg (243 lb)   LMP 10/10/2019   SpO2 100%   BMI 45.91 kg/m²     Date 11/04/19 0700 - 11/05/19 0659(Not Admitted) 11/05/19 0700 - 11/06/19 0659   Shift 6500-1744 7244-4398 24 Hour Total 9062-3575 1087-4031 24 Hour Total   INTAKE   I.V.    1000  1000     Volume (lactated Ringers infusion)    1000  1000   Shift Total(mL/kg)    1000(9.1)  1000(9.1)   OUTPUT   Shift Total(mL/kg)         NET    1000  1000   Weight (kg)    110.2 110.2 110.2

## 2019-11-05 NOTE — INTERVAL H&P NOTE
H&P Update: 
Juan Luis Ramos was seen and examined. History and physical has been reviewed. The patient has been examined.  There have been no significant clinical changes since the completion of the originally dated History and Physical.

## 2019-11-05 NOTE — PERIOP NOTES
Attempted multiple times to get a room assignment on [address] [place] [address]. This RN was explained to that [address] [place] [address] does not have a nurse to receive the pt. So manager made aware and will be contacting [address] [place] [address]. Family explained to as well of pt not having a room assignment yet.

## 2019-11-05 NOTE — OP NOTES
OPERATIVE REPORT         Patient:Viji Khanna   : 1970  Medical Record [id number]    Pre-operative Diagnosis:  HYPERTENSION, MORBID OBESITY, BMI 45, FATTY LIVER  Post-operative Diagnosis: HYPERTENSION, MORBID OBESITY, BMI 45, FATTY LIVER, massive intraabdominal adhesions  Procedure: Procedure(s): 1. LAPAROSCOPIC LYSIS OF ADHESIONS GREATER THAN 30 MINUTES  2. LAPAROSCOPIC GASTRIC SLEEVE  3. WEDGE LIVER BIOPSY   4. INTRAOPERATIVE ENDOSCOPY WITH BIOPSY  5. DIAPHRAGMATIC HERNIA REPAIR  Location: [facility]  Surgeon: Destin Mo MD  Assistant:  Nerissa Burns [facility]  - (there are no qualified interns, residents, or any other qualified house physicians available to assist   with this surgery) performed retraction of various structures,  assisted in creation of the gastric sleeve, fired stapling devices, obtained   hemostasis along staple lines via hemoclips, applied Eviseal,  retrieved all specimens from the abdominal cavity, closed fascial defect, and sutured incisions      Anesthesia: General       Specimens: 1. Gastric Sleeve Resection                       2. Liver Wedge Biopsy    EBL: less than 5cc  Additional Findings: none             STATEMENT OF MEDICAL NECESSITY: The patient is a 53y.o.-year-old female who   has had a history of obesity. she has failed conservative weight loss measures,   such began to consider weight loss surgical options. she chose the   sleeve gastrectomy as a means of surgical weight control. she has undergone   nutritional and psychological teaching at this time period and does wish to proceed   with sleeve gastrectomy. OPERATIVE PROCEDURE: The patient was brought to the operating room, placed   on the table in supine position at which time general  anesthesia was administered   without any difficulty. The abdomen was then prepped and draped in the   usual sterile fashion.  Using a 15 blade, a 1 cm incision was made in the RLQ region   due to her prior laparotomy and mesh hernia repair. The veress needle approach was used to gain access to   the peritoneal cavity which was then insufflated. The Visi-Port was then placed   at that site,then 2 additional trocars were placed. I entered a mass of adhesive disease which   included the omentum and transverse colon. The adhesiolysis performed to free the massive adhesive disease took in excess of 30 minutes to achieve. I placed the remainder of the trocars in a U shaped  configuration with a subxiphoid incision being made to accommodate the   [facility] retractor. On entering the abdomen, the patient was noted to have a   moderately fatty liver with possible evidence of early steatohepatitis. I elevated the liver and   noted the patient had no diaphragmatic hernia present. I began the operation by choosing   an area 2-3 cm proximal to the pylorus and within the gastroepiploic vessels I began to divide   off these vessels individually. I moved cephalad toward short gastric vessels, which were very   difficult to take down due to the proximity to the splenic hilum. I was able to do so, clearing   the entire left crural area. I then placed a Visigi tube, impacting at the distal antrum. I then began the resection with the powered Foraker   stapler using the black loads for the first firing tangential along the   antral region. The second and subsequent firings were used with green and blue  reloads  reaching just past the incisura region. The remainder of the 5 vertical   firings completed the resection at the left crural region. I then tested   the pouch via the Visigi tube using dilute methylene blue,it was noted to be completely   Watertight. I then left the operative field and proceeded to the the head of the bed and   performed an intraoperative EGD. The scope was passed successfully into the gastric   sleeve to the level of the pylorus.  Biopsies were taken of this region and submitted to test   both for H Pylori and for pathologic diagnosis. There was no bleeding noted and no leak   appreciated with air insufflation. I then returned to the surgical field. I then obtained hemostasis   along the staple line using   Hemoclips and sutures where needed. I then used 3 separate 2-0 Ethibond sutures to secure   the lateral aspect of the newly created sleeve stomach to the resected edge of the gastrocolic   omentum in an effort to maintain the continuity of the sleeve and prevent twisting. I then turned   my attention to the diaphragmatic repair. I then dissected out the diaphragmatic hernia and   closed it using a single separate 2-0 Ethibond sutures against the esophageal wall, taking   care not to encroach upon the esophagus. I then used Eviseal along the entire staple line to   obtain hemostasis and then place Surgicel Snow over the staple line also. With all this having   been completed, I then removed the liver retractor. I performed a liver wedge biopsy of the   left lobe of the liver due to its abnormality and submitted to pathology for permanent section. I then placed a HUNTER drain in the left upper quadrant region along the staple line. I removed the   specimen from the operative field via the LLQ incision. I closed the left lower quadrant trocar   site using a transabdominal #0 PDS suture along the fascia, and all skin incisions were then   closed using 4-0 subcuticular Monocryl. Steri-Strips and sterile dressings were applied. The patient tolerated the procedure well.        Jamee Pierson M.D.

## 2019-11-05 NOTE — ANESTHESIA PREPROCEDURE EVALUATION
Relevant Problems   No relevant active problems       Anesthetic History   No history of anesthetic complications            Review of Systems / Medical History  Patient summary reviewed, nursing notes reviewed and pertinent labs reviewed    Pulmonary        Sleep apnea           Neuro/Psych   Within defined limits           Cardiovascular    Hypertension                   GI/Hepatic/Renal  Within defined limits              Endo/Other        Morbid obesity and arthritis     Other Findings              Physical Exam    Airway  Mallampati: II  TM Distance: 4 - 6 cm  Neck ROM: normal range of motion   Mouth opening: Normal     Cardiovascular  Regular rate and rhythm,  S1 and S2 normal,  no murmur, click, rub, or gallop             Dental  No notable dental hx       Pulmonary  Breath sounds clear to auscultation               Abdominal  GI exam deferred       Other Findings            Anesthetic Plan    ASA: 3  Anesthesia type: general          Induction: Intravenous  Anesthetic plan and risks discussed with: Patient

## 2019-11-06 ENCOUNTER — APPOINTMENT (OUTPATIENT)
Dept: GENERAL RADIOLOGY | Age: 49
DRG: 621 | End: 2019-11-06
Attending: SPECIALIST
Payer: COMMERCIAL

## 2019-11-06 VITALS
HEART RATE: 78 BPM | BODY MASS INDEX: 48.01 KG/M2 | SYSTOLIC BLOOD PRESSURE: 124 MMHG | RESPIRATION RATE: 18 BRPM | WEIGHT: 254.3 LBS | DIASTOLIC BLOOD PRESSURE: 66 MMHG | HEIGHT: 61 IN | OXYGEN SATURATION: 98 % | TEMPERATURE: 98.2 F

## 2019-11-06 PROCEDURE — 74011000255 HC RX REV CODE- 255: Performed by: SPECIALIST

## 2019-11-06 PROCEDURE — C9113 INJ PANTOPRAZOLE SODIUM, VIA: HCPCS | Performed by: SPECIALIST

## 2019-11-06 PROCEDURE — 74011250636 HC RX REV CODE- 250/636: Performed by: SPECIALIST

## 2019-11-06 PROCEDURE — 74011250637 HC RX REV CODE- 250/637: Performed by: SPECIALIST

## 2019-11-06 PROCEDURE — 74011000250 HC RX REV CODE- 250: Performed by: SPECIALIST

## 2019-11-06 PROCEDURE — 74240 X-RAY XM UPR GI TRC 1CNTRST: CPT

## 2019-11-06 RX ORDER — ONDANSETRON 8 MG/1
8 TABLET, ORALLY DISINTEGRATING ORAL
Qty: 60 TAB | Refills: 1 | Status: SHIPPED | OUTPATIENT
Start: 2019-11-06 | End: 2019-11-07

## 2019-11-06 RX ORDER — ENOXAPARIN SODIUM 100 MG/ML
40 INJECTION SUBCUTANEOUS EVERY 12 HOURS
Qty: 14 SYRINGE | Refills: 0 | Status: SHIPPED
Start: 2019-11-06 | End: 2019-11-13

## 2019-11-06 RX ORDER — HYDROMORPHONE HYDROCHLORIDE 2 MG/1
2 TABLET ORAL
Status: DISCONTINUED | OUTPATIENT
Start: 2019-11-06 | End: 2019-11-06 | Stop reason: HOSPADM

## 2019-11-06 RX ADMIN — ENOXAPARIN SODIUM 40 MG: 40 INJECTION, SOLUTION INTRAVENOUS; SUBCUTANEOUS at 09:35

## 2019-11-06 RX ADMIN — BARIUM SULFATE 65 G: 960 POWDER, FOR SUSPENSION ORAL at 07:48

## 2019-11-06 RX ADMIN — KETOROLAC TROMETHAMINE 15 MG: 15 INJECTION, SOLUTION INTRAMUSCULAR; INTRAVENOUS at 06:42

## 2019-11-06 RX ADMIN — PANTOPRAZOLE SODIUM 40 MG: 40 INJECTION, POWDER, FOR SOLUTION INTRAVENOUS at 09:31

## 2019-11-06 RX ADMIN — HYDROMORPHONE HYDROCHLORIDE 2 MG: 2 TABLET ORAL at 09:30

## 2019-11-06 RX ADMIN — ONDANSETRON 4 MG: 2 INJECTION INTRAMUSCULAR; INTRAVENOUS at 03:19

## 2019-11-06 RX ADMIN — ACETAMINOPHEN 1000 MG: 10 INJECTION, SOLUTION INTRAVENOUS at 06:42

## 2019-11-06 RX ADMIN — MORPHINE SULFATE 6 MG: 4 INJECTION INTRAVENOUS at 03:18

## 2019-11-06 NOTE — NURSE NAVIGATOR
Patient's  at bedside. Patient sitting on side of bed eating ice chips and tolerating well. Patient awaiting her bariatric tray. Vitals:   Blood pressure 120/72, pulse 83, temperature 98.3 °F (36.8 °C), resp. rate 17, height 5' 1\" (1.549 m), weight 115.3 kg (254 lb 4.8 oz), last menstrual period 10/10/2019, SpO2 98 %. Output: reviewed, and patient verified urinating clear, yellow urine. Pulmonary: Clear in all lobes  Cardiac: Regular rate and rhythm  Abdomen: Bowel sounds hypo-active x4. Lap sites without erythema, swelling,  and/or drainage. HUNTER drain with a small amount of serosanguinous drainage. SCD's: Positioned and operating WNL    Patient with expected pain, but is being managed and is currently tolerable. No nausea and/or vomiting. Patient has been ambulating the halls. Patient is able to swallow pills. Post-op diet progression discussed with patient. Patient to be discharged on a bariatric full liquid diet. Patient verbalized understanding of liquid diet for next two weeks until first post-op visit. Goal of four ounces per hour with one ounce being protein was clearly understood. Medications were discussed (i.e., pain- Percocet, Tylenol, not to use aspirin or ibuprofen based products, as well as steroids). Constipation was discussed and ways to alleviate it were discussed. Education completed on IS use and to ambulate every hour when at home. Patient completed a return demonstration on IS with no issues or concerns from this RN. All scripts given during pre-op visit were filled and in the home. Lovenox education provided, and patient will be administering herself. Ketosis was also reviewed. Patient given a report card to record intake and a handout to support bedside education. All questions were answered by this RN, and patient verbalized understanding to all education provided. Goals for discharge were discussed, and patient verbalized understanding.   Post-op follow-up virginia santos scheduled.

## 2019-11-06 NOTE — PROGRESS NOTES
Bariatric Surgery                POD #1    Visit Vitals  /61 (BP 1 Location: Left arm, BP Patient Position: At rest)   Pulse 83   Temp 98.2 °F (36.8 °C)   Resp 16   Ht 5' 1\" (1.549 m)   Wt 115.3 kg (254 lb 4.8 oz)   LMP 10/10/2019   SpO2 97%   BMI 48.05 kg/m²     Patient has minimal complaints of pain at this time, she states that yesterday her pain was significant and she was having nausea. She currently has minimal nausea. Exam:  Appears well in no distress  Lungs- clear bilaterally  Abd - soft, incisions look good without erythema           HUNTER with minimal serosanguinous output  Extremities- no new edema or swelling    UGI - pending    Data Review:    Labs: Results:       Chemistry No results for input(s): GLU, NA, K, CL, CO2, BUN, CREA, CA, AGAP, BUCR, TBIL, GPT, AP, TP, ALB, GLOB, AGRAT in the last 72 hours. CBC w/Diff No results for input(s): WBC, RBC, HGB, HCT, PLT, GRANS, LYMPH, EOS, RETIC, HGBEXT, HCTEXT, PLTEXT in the last 72 hours. Coagulation No results for input(s): PTP, INR, APTT, INREXT in the last 72 hours. Liver Enzymes No results for input(s): TP, ALB, TBIL, AP, SGOT, GPT in the last 72 hours. No lab exists for component: DBIL       Assessment/Plan: S/P  laparoscopic sleeve gastrectomy - doing well without any issues    Orders are pending until UGI study shows normal anatomy. 1.Start bariatric diet and protein shakes  2. D/C IV pain meds and start PO pain meds  3. D/C HUNTER drain  4. Likely PM D/C if continues to be okay and tolerates PO

## 2019-11-06 NOTE — PROGRESS NOTES
Transition of Care (SIMIN) Plan:    Physician follow up    Chart reviewed. Pt admitted for an elective surgical procedure (LYSIS OF ADHESIONS FOR 30 MINUTES, LAPAROSCOPIC GASTRIC SLEEVE WITH LIVER BIOPSY AND INTRAOPERATIVE ENDOSCOPY, DIAPHRAGMATIC HERNIA REPAIR). Pt is independent. Please encourage ambulation. No transition of care needs identified at this time. Anticipate pt will be medically stable for discharge within the next 24-48 hours with physician follow up. CM available to assist as needed. 1245:  CM attempted to meet with pt at bedside to discuss transition of care. Pt is currently ambulating in the halls independently with family. No transition of care needs have been identified. SIMIN Transportation:   How is patient being transported at discharge? Family/Friend      When? Once cleared by physician     Is transport scheduled? N/A      Follow-up appointment and transportation:   PCP/Specialist?  See AVS for Appointment         Who is transporting to the follow-up appointment? Self/Family/Friend      Is transport for follow up appointment scheduled? N/A    Communication plan (with patient/family): Who is being called? Patient or Next of Kin? Responsible party? Patient      What number(s) is to be used? See Facesheet      What service provider is calling for [facility] services? When are they calling? Readmission Risk? (Green/Low; Yellow/Moderate; Red/High):  Green    Care Management Interventions  PCP Verified by CM: Yes  Mode of Transport at Discharge:  Other (see comment)(Family)  Transition of Care Consult (CM Consult): Discharge Planning  Health Maintenance Reviewed: Yes  Current Support Network: Lives with Spouse  Confirm Follow Up Transport: Self  Discharge Location  Discharge Placement: Home with family assistance

## 2019-11-06 NOTE — PROGRESS NOTES
Shift summary: Pt medicated x 2 for pain rated 6-8/10 to upper abdomen. Incision sites CDI. HUNTER drain patent and draining, total output of 40 ml (serosang). Educated on importance of using incentive spirometer hourly while awake; pt able to tolerate max volume of 1000 ml. Total urine output for shift 650 ml. Urine is blood-tinged due to menstruation; pt now has in a tampon per request. Ambulated hallway x 2, tolerated well, denies passing flatus/belching. Patient Vitals for the past 12 hrs:   Temp Pulse Resp BP SpO2   11/06/19 0340 98.2 °F (36.8 °C) 83 16 121/61 97 %   11/05/19 2259 98.5 °F (36.9 °C) 84 16 122/73 95 %     Bedside and Verbal shift change report given to REJI Marc (oncoming nurse) by Faith Otoole RN (offgoing nurse). Report included the following information SBAR, Kardex, Intake/Output and MAR.

## 2019-11-06 NOTE — PROGRESS NOTES
Bariatric Surgery                POD #1    Visit Vitals  /66 (BP 1 Location: Right arm, BP Patient Position: At rest)   Pulse 78   Temp 98.2 °F (36.8 °C)   Resp 18   Ht 5' 1\" (1.549 m)   Wt 115.3 kg (254 lb 4.8 oz)   LMP 10/10/2019   SpO2 98%   BMI 48.05 kg/m²     Patient has minimal complaints of pain, minimal nausea noted     Exam:  Appears well in no distress  Lungs- clear bilaterally  Abd - soft, incisions look good without erythema           HUNTER with minimal serosanguinous output  Extremities- no new edema or swelling    UGI - no obstrustion or leak    Data Review:    Labs: Results:       Chemistry No results for input(s): GLU, NA, K, CL, CO2, BUN, CREA, CA, AGAP, BUCR, TBIL, GPT, AP, TP, ALB, GLOB, AGRAT in the last 72 hours. CBC w/Diff No results for input(s): WBC, RBC, HGB, HCT, PLT, GRANS, LYMPH, EOS, RETIC, HGBEXT, HCTEXT, PLTEXT in the last 72 hours. Coagulation No results for input(s): PTP, INR, APTT, INREXT in the last 72 hours. Liver Enzymes No results for input(s): TP, ALB, TBIL, AP, SGOT, GPT in the last 72 hours. No lab exists for component: DBIL       Assessment/Plan: S/P  laparoscopic sleeve gastrectomy - doing well without any issues    1.  Agree with D/C later today

## 2019-11-06 NOTE — DISCHARGE SUMMARY
Discharge Summary    Patient: Tracy Bowie               Sex: female          DOA: 11/5/2019         YOB: 1970      Age:  51 y.o.        LOS:  LOS: 1 day                Admit Date: 11/5/2019    Discharge Date: 11/6/2019    Admission Diagnoses:  Morbid obesity with BMI of 40.0-44.9, adult ([facility] [E66.01, Z68.41]    Discharge Diagnoses:    Problem List as of 11/6/2019 Date Reviewed: 11/5/2019          Codes Class Noted - Resolved    * (Principal) Morbid obesity with BMI of 40.0-44.9, adult ([facility] ICD-10-CM: E66.01, Z68.41  ICD-9-CM: 278.01, V85.41  11/5/2019 - Present        Brain lesion ICD-10-CM: G93.9  ICD-9-CM: 348.89  8/22/2019 - Present    Overview Signed 8/22/2019  8:11 AM by SUKHDEEP Mcdonough     07/2019 Dr. Beatriz Sigala             Hx of ventral hernia repair ICD-10-CM: W36.009, Z87.19  ICD-9-CM: V15.29  8/22/2019 - Present    Overview Addendum 10/28/2019  3:46 PM by Scottie Barajas MD     By Dr. Maciej Navarro in 2009             Psoriatic arthritis ([facility] ICD-10-CM: L40.50  ICD-9-CM: 696.0  Unknown - Present        Hypertension ICD-10-CM: I10  ICD-9-CM: 401.9  Unknown - Present        HNP (herniated nucleus pulposus) with myelopathy, cervical ICD-10-CM: M50.00  ICD-9-CM: 722.71  Unknown - Present        Urinary, incontinence, stress female ICD-10-CM: N39.3  ICD-9-CM: 625.6  Unknown - Present        Migraines ICD-10-CM: S44.579  ICD-9-CM: 346.90  Unknown - Present        Arthritis ICD-10-CM: M19.90  ICD-9-CM: 716.90  5/6/2019 - Present        Morbid obesity ([facility] ICD-10-CM: E66.01  ICD-9-CM: 278.01  Unknown - Present        Morbid obesity with BMI of 45.0-49.9, adult ([facility]) ICD-10-CM: E66.01, Z68.42  ICD-9-CM: 278.01, V85.42  Unknown - Present        Fibroids ICD-10-CM: D21.9  ICD-9-CM: 215.9  Unknown - Present        Tingling in extremities ICD-10-CM: R20.2  ICD-9-CM: 782.0  Unknown - Present        Obesity, morbid ([facility] ICD-10-CM: E66.01  ICD-9-CM: 278.01  1/28/2019 - Present        S/P cervical spinal fusion ICD-10-CM: Z98.1  ICD-9-CM: V45.4  3/30/2017 - Present        Cervical spinal stenosis ICD-10-CM: M48.02  ICD-9-CM: 723.0  2/24/2017 - Present        HNP (herniated nucleus pulposus), cervical ICD-10-CM: M50.20  ICD-9-CM: 722.0  2/24/2017 - Present        Cervical herniated disc ICD-10-CM: M50.20  ICD-9-CM: 722.0  1/23/2017 - Present        Cervical spondylosis without myelopathy ICD-10-CM: M47.812  ICD-9-CM: 721.0  1/23/2017 - Present        Cervical neuritis ICD-10-CM: M54.12  ICD-9-CM: 723.4  1/23/2017 - Present        DDD (degenerative disc disease), cervical ICD-10-CM: M50.30  ICD-9-CM: 722.4  1/23/2017 - Present              Discharge Condition: Good    Hospital Course: The patient underwent  laparoscopic sleeve gastrectomy  on 11/5/2019. The patient tolerated the procedure well. Vital signs remained stable and the patient was transferred to  3rd floor surgical unit without complications. The patient remained stable throughout the first night post operatively with stable vital signs and adequate urine output and pain control. Pain was controlled with Morphine IV and IV Tylenol . The patient on the first morning post operative was transferred to the radiology suite where they underwent a gastrograffin UGI study which showed no evidence of a leak or stricture. The drain was discontinued on POD # 1 and the patient was started on a bariatric liquid diet with protein shakes. The patient progressed throughout the day and was ambulating well and tolerating their diet. They were therefore discharged home with instructions to notify me with any issues that may arise. Significant Diagnostic Studies:   No results for input(s): HGB, HGBEXT in the last 72 hours. No results for input(s): HCT, HCTEXT in the last 72 hours.     Current Discharge Medication List      START taking these medications    Details   ondansetron (ZOFRAN ODT) 8 mg disintegrating tablet Take 1 Tab by mouth every eight (8) hours as needed for Nausea. Qty: 60 Tab, Refills: 1    Associated Diagnoses: Postoperative nausea         CONTINUE these medications which have CHANGED    Details   enoxaparin (LOVENOX) 40 mg/0.4 mL 0.4 mL by SubCUTAneous route every twelve (12) hours every twelve (12) hours for 7 days. Indications: Deep Vein Thrombosis Prevention  Qty: 14 Syringe, Refills: 0         CONTINUE these medications which have NOT CHANGED    Details   omeprazole (PRILOSEC) 20 mg capsule Take 1 Cap by mouth daily. Qty: 30 Cap, Refills: 2      minocycline (DYNACIN) 100 mg tablet TAKE 1 TABLET BY MOUTH EVERY DAY  Refills: 2      clindamycin-benzoyl peroxide (BENZACLIN) 1-5 % topical gel Apply  to affected area. apremilast 30 mg tab Take 30 mg by mouth two (2) times a day. Indications: psoriasis associated with arthritis      acyclovir (ZOVIRAX) 400 mg tablet Take one tid for 5 days prn cold sores      meclizine (ANTIVERT) 25 mg tablet 25 mg. Indications: sensation of spinning or whirling      triamcinolone (NASACORT AQ) 55 mcg nasal inhaler 1 [place] by Both Nostrils route as needed (Seasonal Allergies). gabapentin (NEURONTIN) 300 mg capsule Take 1 Cap by mouth three (3) times daily (with meals). Indications: NEUROPATHIC PAIN  Qty: 90 Cap, Refills: 1    Associated Diagnoses: Cervical herniated disc; Cervical spondylosis without myelopathy; Cervical neuritis; DDD (degenerative disc disease), cervical         STOP taking these medications       aspirin/acetaminophen/caffeine (EXCEDRIN MIGRAINE PO) Comments:   Reason for Stopping:         hydroCHLOROthiazide (HYDRODIURIL) 25 mg tablet Comments:   Reason for Stopping:         cholecalciferol, vitamin D3, (VITAMIN D3) 2,000 unit tab Comments:   Reason for Stopping:         cyanocobalamin 1,000 mcg tablet Comments:   Reason for Stopping:               Activity: activity as tolerated with no heavy lifting of greater than 20 pounds. No anti- inflammatory medications.  Use stool softeners at home as needed while taking pain medications since they are constipating. Diet: Bariatric liquid diet    Wound Care: Keep wound clean and dry, Reinforce dressing PRN and ice to area for comfort. Do not get wound wet for 2 days.     Follow-up: 14 days with Dr Champ Barreto M.D

## 2019-11-07 ENCOUNTER — TELEPHONE (OUTPATIENT)
Dept: SURGERY | Age: 49
End: 2019-11-07

## 2019-11-07 RX ORDER — ONDANSETRON 4 MG/1
4 TABLET, ORALLY DISINTEGRATING ORAL
Qty: 60 TAB | Refills: 0 | Status: SHIPPED | OUTPATIENT
Start: 2019-11-07

## 2019-11-07 NOTE — TELEPHONE ENCOUNTER
This RN spoke with patient post-operatively. Sipping: Patient sipped 22 ounces yesterday. She stated she has only had five ounces today, because her stomach \"hurts. \"  She stated she feels \"gassy\" as she sips. She is currently taking Gas X, with minimal relief. She states she is walking throughout her house constantly. She stated her liquids are room temperature. She was able to tolerate sugar-free Jello yesterday. RN recommended [place] yogurt, sugar-free Jello/pudding, and slowly reintroduce the liquids (e.g., water, Gatorade Zero) or trying to warm liquids to see if that was better for her. RN will follow-up tomorrow. [facility] did not have any hydration appointments available for tomorrow. Nausea and/or vomitting: Some nausea with sipping and when taking Percocet. RN reminded her not to take meds/vitamins on an empty stomach and recommended protein source, [place] yogurt, sugar-free pudding/Jello prior to taking. She verbalized understanding. She stated her insurance would not cover Zofran, so this RN had her download the [employer] application on her phone for a coupon and sent Zofran to [address], as it was the cheapest there. Pain: Currently managed with Percocet and/or Tylenol    Lovenox injections: Administering every 12 hours, rotating sites. RN reminded patient to complete all injections, in which patient verbalized understanding. Lap sites: No erythema, drainage, and/or swelling    HUNTER drain site: No drainage; dressing removed    BM: None to date, but is passing flatus. Ambulation: Patient is walking throughout house every hour. IS: Patient continues to use 10x's per hour while awake. Temperature: 98 degrees    Medications: (confirmed currently taking)   *Multi-vitamin: yes   *Probiotic: yes   *Prilosec: yes    Questions: None    This RN reminded the patient to contact the office with any questions and/or concerns.   RN also reminded patient they will receive another follow-up TC prior to the two week post-op follow-up appointment. Patient verbalized understanding to both. Patient's two week post-op visit is scheduled and was confirmed.

## 2019-11-08 ENCOUNTER — TELEPHONE (OUTPATIENT)
Dept: SURGERY | Age: 49
End: 2019-11-08

## 2019-11-08 NOTE — TELEPHONE ENCOUNTER
Pulse: 80    Hydration: As of today, patient has sipped 23 ounces. Warm tea was soothing and worked. She took Infant drops, and her gas is more managed. She has been able to alleviate a lot of gas.

## 2019-11-12 ENCOUNTER — TELEPHONE (OUTPATIENT)
Dept: SURGERY | Age: 49
End: 2019-11-12

## 2019-11-12 NOTE — TELEPHONE ENCOUNTER
This RN spoke with patient post-operatively. Sipping: Patient is up to sipping 24 ounces daily. Again, patient is having challenges with remembering to sip despite this RN having given her the recommendation of using her cell phone as a timer/reminder. Patient scheduled for hydration services. Nausea and/or vomitting: None    Pain: No pain    Lovenox injections: Administering every 12 hours, rotating sites. RN reminded patient to complete all injections, in which patient verbalized understanding. Lap sites: No erythema, drainage, and/or swelling    HUNTER drain site: No drainage; dressing removed    BM: None to date, but is passing flatus. Patient will begin taking Miralax. Ambulation: Patient is walking throughout house every hour. IS: Patient continues to use 10x's per hour while awake. Temperature: 99 degrees    Pulse: 81 bpm    BP: 135/85    Medications: (confirmed currently taking)   *Multi-vitamin: yes   *Probiotic: yes   *Prilosec: yes    Questions: None    Patient was scheduled for hydration services at [facility] for tomorrow at 1100. This RN reminded the patient to contact the office with any questions and/or concerns. RN also reminded patient they will receive another follow-up TC prior to the two week post-op follow-up appointment. Patient verbalized understanding to both. Patient's two week post-op visit is scheduled and was confirmed.

## 2019-11-18 ENCOUNTER — TELEPHONE (OUTPATIENT)
Dept: SURGERY | Age: 49
End: 2019-11-18

## 2019-11-18 PROBLEM — Z98.84 S/P LAPAROSCOPIC SLEEVE GASTRECTOMY: Status: ACTIVE | Noted: 2019-11-18

## 2019-11-18 PROBLEM — K90.9 INTESTINAL MALABSORPTION: Status: ACTIVE | Noted: 2019-11-18

## 2019-11-18 NOTE — TELEPHONE ENCOUNTER
This RN spoke with patient post-operatively. Sipping: Patient is up to sipping 40 ounces daily. She continues with the same issues, feeling full, not remembering to sip.   She states she always has hydration on her and is sipping constantly. She denies darker urine, dizziness, and has even been checking her skin turgor, in which she states she is fine.      Nausea and/or vomitting: None    Pain: No pain    Lovenox injections: completed    Lap sites: No erythema, drainage, and/or swelling    HUNTER drain site: healed    BM: Had BM on fifth day post-operatively, and then another one yesterday    Ambulation: Patient is walking throughout house every hour. Patient has even ventured out of the home. IS: Patient continues to use 10x's per hour while awake. Temperature: 98.9 degrees    BP: 123/81    Pulse: 93 bpm    Medications: (confirmed currently taking)   *Multi-vitamin: yes   *Probiotic: yes   *Prilosec: yes    Questions: None    This RN reminded the patient to contact the office with any questions and/or concerns. Patient verbalized understanding.

## 2019-11-18 NOTE — PROGRESS NOTES
Subjective:      Vicente Ballesteros is a 51 y.o. female is now 2 weeks status post laparoscopic sleeve gastrectomy. Doing well overall. She has lost a total of 19 pounds since surgery. Body mass index is 42.51 kg/m². Currently on a liquid diet without difficulty. Taking in 30-40 oz water daily. Sources of protein include protein shakes. Has not started exercising yet. Bowel movements are regular. The patient is not having any pain. . The patient is compliant with multivitamins. Surgery related complications: NA.  Liver bx report reviewed with patient. Stomach bx report reviewed with patient.     Weight Loss Metrics 11/19/2019 11/5/2019 10/28/2019 10/24/2019 9/20/2019 8/22/2019 8/22/2019   Today's Wt 225 lb 254 lb 4.8 oz 244 lb 244 lb 242 lb 242 lb 4.8 oz 242 lb   BMI 42.51 kg/m2 48.05 kg/m2 46.1 kg/m2 46.1 kg/m2 45.73 kg/m2 45.78 kg/m2 45.73 kg/m2          Comorbidities:    Hypertension: improved, no Rx  Diabetes: not applicable  Obstructive Sleep Apnea: not applicable  Hyperlipidemia: not applicable  Stress Urinary Incontinence: resolved  Gastroesophageal Reflux: not applicable  Weight related arthropathy:improved     Patient Active Problem List   Diagnosis Code    Cervical herniated disc M50.20    Cervical spondylosis without myelopathy M47.812    Cervical neuritis M54.12    DDD (degenerative disc disease), cervical M50.30    Cervical spinal stenosis M48.02    HNP (herniated nucleus pulposus), cervical M50.20    S/P cervical spinal fusion Z98.1    Obesity, morbid ([facility]) E66.01    Psoriatic arthritis ([address] L40.50    Hypertension I10    HNP (herniated nucleus pulposus) with myelopathy, cervical M50.00    Urinary, incontinence, stress female N39.3    Migraines G43.909    Arthritis M19.90    Morbid obesity ([address] E66.01    Morbid obesity with BMI of 45.0-49.9, adult (HCC) E66.01, Z68.42    Fibroids D21.9    Tingling in extremities R20.2    Brain lesion G93.9    Hx of ventral hernia repair Z98.890, Z87.19    Morbid obesity with BMI of 40.0-44.9, adult ([facility]) E66.01, Z68.41    Intestinal malabsorption K90.9    S/P laparoscopic sleeve gastrectomy Z98.84        Past Medical History:   Diagnosis Date    Arthritis     spinal stenosis    Autoimmune disease ([facility]     psoriatic arthritis    Brain lesion 2019 Dr. Kailey Youngolphus Fibroids     HNP (herniated nucleus pulposus) with myelopathy, cervical     Hx of ventral hernia repair 2019    By Dr. Enoch العلي Hypertension 2014    Migraines     Morbid obesity ([facility]     Morbid obesity with BMI of 45.0-49.9, adult ([facility]     Psoriatic arthritis ([facility]     Dr. Henriquez Patch    Sleep apnea     being evaluated at this time    Tingling in extremities     Urinary, incontinence, stress female        Past Surgical History:   Procedure Laterality Date    HX  SECTION      x 3    HX CHOLECYSTECTOMY      HX HERNIA REPAIR      x2 umbilical with mesh    HX TUBAL LIGATION      NEUROLOGICAL PROCEDURE UNLISTED  2017    C3-4 Arthroplasty       Current Outpatient Medications   Medication Sig Dispense Refill    B.infantis-B.ani-B.long-B.bifi (PROBIOTIC 4X) 10-15 mg TbEC Take  by mouth.  multivitamin with iron (FLINTSTONES) chewable tablet Take 1 Tab by mouth daily.  ondansetron (ZOFRAN ODT) 4 mg disintegrating tablet Take 1 Tab by mouth every six (6) hours as needed for Nausea. 60 Tab 0    omeprazole (PRILOSEC) 20 mg capsule Take 1 Cap by mouth daily. 30 Cap 2    minocycline (DYNACIN) 100 mg tablet TAKE 1 TABLET BY MOUTH EVERY DAY  2    clindamycin-benzoyl peroxide (BENZACLIN) 1-5 % topical gel Apply  to affected area.  apremilast 30 mg tab Take 30 mg by mouth two (2) times a day. Indications: psoriasis associated with arthritis      acyclovir (ZOVIRAX) 400 mg tablet Take one tid for 5 days prn cold sores      meclizine (ANTIVERT) 25 mg tablet 25 mg.  Indications: sensation of spinning or whirling      triamcinolone (NASACORT AQ) 55 mcg nasal inhaler 1 [place] by Both Nostrils route as needed (Seasonal Allergies).  gabapentin (NEURONTIN) 300 mg capsule Take 1 Cap by mouth three (3) times daily (with meals). Indications: NEUROPATHIC PAIN 90 Cap 1       Allergies   Allergen Reactions    Fentanyl Nausea and Vomiting    Iodine Hives    Oxycodone Other (comments)    Penicillin G Other (comments)    Phenergan [Promethazine] Nausea and Vomiting       Review of Symptoms:       General - No history or complaints of unexpected fever or chills  Head/Neck - No history or complaints of headache or dizziness  Cardiac - No history or complaints of chest pain, palpitations, or shortness of breath  Pulmonary - No history or complaints of shortness of breath or productive cough  Gastrointestinal - as noted above  Genitourinary - No history or complaints of hematuria/dysuria or renal lithiasis  Musculoskeletal - No history or complaints of joint  muscular weakness  Hematologic - No history of any bleeding episodes  Neurologic - No history or complaints of  migraine headaches or neurologic symptoms        Objective:     Visit Vitals  /79 (BP 1 Location: Left arm, BP Patient Position: Sitting)   Pulse 84   Temp 97.8 °F (36.6 °C)   Ht 5' 1\" (1.549 m)   Wt 102.1 kg (225 lb)   SpO2 99%   BMI 42.51 kg/m²       General:  alert, cooperative, no distress, appears stated age   Chest: lungs clear to auscultation, breath sounds equal and symmetric, no rhonchi, rales or wheezes, no accessory muscle use   Cor:   Regular rate and rhythm, S1S2 present or without murmur or extra heart sounds   Abdomen: soft, bowel sounds active, non-tender, no masses or organomegaly   Incisions:   healing well, no drainage, no erythema, no hernia, no seroma, no swelling, no dehiscence, incision well approximated       GASTRIC SLEEVE RESECTION:   SEGMENT OF BENIGN STOMACH. LIVER, WEDGE BIOPSY:   MINIMAL STEATOSIS. NO EVIDENCE OF STEATOHEPATITIS, FIBROSIS OR CIRRHOSIS. STOMACH, PREPYLORIC BIOPSIES:   BENIGN GASTRIC MUCOSA. HELICOBACTER-LIKE ORGANISMS ARE NOT IDENTIFIED. Assessment:   History of Morbid obesity, status post laparoscopic sleeve gastrectomy. Doing well postoperatively. HTN - off Rx, f/u PCP    Plan:     1. Increase activity to the goal of 30 minutes daily and Increase fluids  2. Advance diet to soft solid phase. Reminded to measure portions, continue high protein, low carbohydrate diet. Reminded to eat regularly, to eat slowly & not to drink with meals. Refer to the handbook given in class. 3. Continue multivitamin   4. Continue current medications and follow up with PCP for management of regimen. 5. Encouraged to attend support group   6. I have discussed this plan with patient and they verbalized understanding  7. Follow up in 2 weeks or sooner if patient has questions, concerns or worsening of condition, if unable to reach our office, patient should report to the ED. 8. Ms. Adolfo Carvajal has a reminder for a \"due or due soon\" health maintenance. I have asked that she contact her primary care provider for a follow-up on this health maintenance.

## 2019-11-19 ENCOUNTER — CLINICAL SUPPORT (OUTPATIENT)
Dept: SURGERY | Age: 49
End: 2019-11-19

## 2019-11-19 ENCOUNTER — OFFICE VISIT (OUTPATIENT)
Dept: SURGERY | Age: 49
End: 2019-11-19

## 2019-11-19 VITALS
HEART RATE: 84 BPM | WEIGHT: 225 LBS | OXYGEN SATURATION: 99 % | TEMPERATURE: 97.8 F | DIASTOLIC BLOOD PRESSURE: 79 MMHG | HEIGHT: 61 IN | BODY MASS INDEX: 42.48 KG/M2 | SYSTOLIC BLOOD PRESSURE: 103 MMHG

## 2019-11-19 DIAGNOSIS — K90.9 INTESTINAL MALABSORPTION, UNSPECIFIED TYPE: Primary | ICD-10-CM

## 2019-11-19 DIAGNOSIS — Z98.84 S/P LAPAROSCOPIC SLEEVE GASTRECTOMY: ICD-10-CM

## 2019-11-19 DIAGNOSIS — E66.01 MORBID OBESITY (HCC): Primary | ICD-10-CM

## 2019-11-19 NOTE — PROGRESS NOTES
Hareshadina Zaldivar presents today for   Chief Complaint   Patient presents with    Follow-up     Pt is here today for her follow up       Is someone accompanying this pt? no    Is the patient using any DME equipment during OV? no    Depression Screening:  3 most recent PHQ Screens 11/19/2019   Little interest or pleasure in doing things Not at all   Feeling down, depressed, irritable, or hopeless Not at all   Total Score PHQ 2 0       Learning Assessment:  Learning Assessment 11/19/2019   PRIMARY LEARNER Patient   HIGHEST LEVEL OF EDUCATION - PRIMARY LEARNER  GRADUATED HIGH SCHOOL OR GED   BARRIERS PRIMARY LEARNER NONE   CO-LEARNER CAREGIVER No   PRIMARY LANGUAGE ENGLISH    NEED No   LEARNER PREFERENCE PRIMARY DEMONSTRATION     -   LEARNING SPECIAL TOPICS no   ANSWERED BY patient   RELATIONSHIP SELF       Abuse Screening:  Abuse Screening Questionnaire 11/19/2019   Do you ever feel afraid of your partner? N   Are you in a relationship with someone who physically or mentally threatens you? N   Is it safe for you to go home? Y       Fall Risk  No flowsheet data found. Coordination of Care:  1. Have you been to the ER, urgent care clinic since your last visit? Hospitalized since your last visit? no    2. Have you seen or consulted any other health care providers outside of the [address] [place] [address] since your last visit? Include any pap smears or colon screening.  no

## 2019-11-20 NOTE — PROGRESS NOTES
Reviewed diet progression for weeks 3-4. Patient appears to have a good understanding of the diet progression, food choices, and dietary/exercise habits for successful weight loss and nourishment after surgery. The class material included: post-op diet progression, including soft/pureed high protein low fat, low sugar food recommendations; proper food group choices. We reviewed appropriate food choices, cooking techniques, and eating behavior modifications. Discussed intake regimen with 3 meals and 2-3 protein supplements per day. Reinforced the importance of adequate fluid with goal of 64 oz per day and adequate protein with goal of  grams per day.      Michael Farfan

## 2019-11-27 RX ORDER — OMEPRAZOLE 20 MG/1
20 CAPSULE, DELAYED RELEASE ORAL DAILY
Qty: 30 CAP | Refills: 2 | Status: SHIPPED | OUTPATIENT
Start: 2019-11-27 | End: 2019-12-03 | Stop reason: SDUPTHER

## 2019-12-02 NOTE — PROGRESS NOTES
Subjective:      Mat Celis is a 51 y.o. female is now 1 months status post laparoscopic sleeve gastrectomy. Doing well overall. She has lost a total of 19 pounds since surgery. Body mass index is 42.51 kg/m². Has lost 16% of EBW. Currently on a soft food diet without difficulty, reports no real issues and denies vomiting, abdominal pain, diarrhea and nausea. Taking in 60oz water daily. Sources of protein include chili, chicken. Not started exercising yet. Bowel movements are regular. The patient is not having any pain. . The patient is compliant with multivitamin.      Weight Loss Metrics 12/3/2019 11/19/2019 11/5/2019 10/28/2019 10/24/2019 9/20/2019 8/22/2019   Today's Wt 225 lb 225 lb 254 lb 4.8 oz 244 lb 244 lb 242 lb 242 lb 4.8 oz   BMI 42.51 kg/m2 42.51 kg/m2 48.05 kg/m2 46.1 kg/m2 46.1 kg/m2 45.73 kg/m2 45.78 kg/m2          Comorbidities:  Hypertension: improved, off Rx  Diabetes: not applicable  Obstructive Sleep Apnea: not applicable  Hyperlipidemia: not applicable  Stress Urinary Incontinence: resolved  Gastroesophageal Reflux: not applicable  Weight related arthropathy:improved     Patient Active Problem List   Diagnosis Code    Cervical herniated disc M50.20    Cervical spondylosis without myelopathy M47.812    Cervical neuritis M54.12    DDD (degenerative disc disease), cervical M50.30    Cervical spinal stenosis M48.02    HNP (herniated nucleus pulposus), cervical M50.20    S/P cervical spinal fusion Z98.1    Obesity, morbid ([facility] E66.01    Psoriatic arthritis ([address] L40.50    Hypertension I10    HNP (herniated nucleus pulposus) with myelopathy, cervical M50.00    Urinary, incontinence, stress female N39.3    Migraines G43.909    Arthritis M19.90    Morbid obesity ([address] E66.01    Morbid obesity with BMI of 45.0-49.9, adult ([address] E66.01, Z68.42    Fibroids D21.9    Tingling in extremities R20.2    Brain lesion G93.9    Hx of ventral hernia repair Z98.890, Z87.19    Morbid obesity with BMI of 40.0-44.9, adult ([facility]) E66.01, Z68.41    Intestinal malabsorption K90.9    S/P laparoscopic sleeve gastrectomy Z98.84        Past Medical History:   Diagnosis Date    Arthritis     spinal stenosis    Autoimmune disease ([facility]     psoriatic arthritis    Brain lesion 2019 Dr. Yanet Velazco Fibroids     HNP (herniated nucleus pulposus) with myelopathy, cervical     Hx of ventral hernia repair 2019    By Dr. Della Montiel Hypertension 2014    Migraines     Morbid obesity ([facility]     Morbid obesity with BMI of 45.0-49.9, adult ([facility]     Psoriatic arthritis ([facility]     Dr. Deonna Lugo    Sleep apnea     being evaluated at this time    Tingling in extremities     Urinary, incontinence, stress female        Past Surgical History:   Procedure Laterality Date    HX  SECTION      x 3    HX CHOLECYSTECTOMY      HX HERNIA REPAIR      x2 umbilical with mesh    HX TUBAL LIGATION      NEUROLOGICAL PROCEDURE UNLISTED  2017    C3-4 Arthroplasty       Current Outpatient Medications   Medication Sig Dispense Refill    omeprazole (PRILOSEC) 20 mg capsule Take 1 Cap by mouth daily. 30 Cap 2    nystatin (MYCOSTATIN) topical cream Apply  to affected area two (2) times a day. 30 g 2    B.infantis-B.ani-B.long-B.bifi (PROBIOTIC 4X) 10-15 mg TbEC Take  by mouth.  multivitamin with iron (FLINTSTONES) chewable tablet Take 1 Tab by mouth daily.  ondansetron (ZOFRAN ODT) 4 mg disintegrating tablet Take 1 Tab by mouth every six (6) hours as needed for Nausea. 60 Tab 0    minocycline (DYNACIN) 100 mg tablet TAKE 1 TABLET BY MOUTH EVERY DAY  2    clindamycin-benzoyl peroxide (BENZACLIN) 1-5 % topical gel Apply  to affected area.  apremilast 30 mg tab Take 30 mg by mouth two (2) times a day. Indications: psoriasis associated with arthritis      acyclovir (ZOVIRAX) 400 mg tablet Take one tid for 5 days prn cold sores      meclizine (ANTIVERT) 25 mg tablet 25 mg. Indications: sensation of spinning or whirling      triamcinolone (NASACORT AQ) 55 mcg nasal inhaler 1 [place] by Both Nostrils route as needed (Seasonal Allergies).  gabapentin (NEURONTIN) 300 mg capsule Take 1 Cap by mouth three (3) times daily (with meals).  Indications: NEUROPATHIC PAIN 90 Cap 1       Allergies   Allergen Reactions    Fentanyl Nausea and Vomiting    Iodine Hives    Oxycodone Other (comments)    Penicillin G Other (comments)    Phenergan [Promethazine] Nausea and Vomiting       Review of Symptoms:       General - No history or complaints of unexpected fever or chills  Head/Neck - No history or complaints of headache or dizziness  Cardiac - No history or complaints of chest pain, palpitations, or shortness of breath  Pulmonary - No history or complaints of shortness of breath or productive cough  Gastrointestinal - as noted above  Genitourinary - No history or complaints of hematuria/dysuria or renal lithiasis  Musculoskeletal - No history or complaints of joint  muscular weakness  Hematologic - No history of any bleeding episodes  Neurologic - No history or complaints of  migraine headaches or neurologic symptoms        Objective:     Visit Vitals  /78 (BP 1 Location: Right arm, BP Patient Position: Sitting)   Pulse 73   Temp 98.2 °F (36.8 °C)   Ht 5' 1\" (1.549 m)   Wt 102.1 kg (225 lb)   SpO2 100%   BMI 42.51 kg/m²       General:  alert, cooperative, no distress, appears stated age   Chest: lungs clear to auscultation, breath sounds equal and symmetric, no rhonchi, rales or wheezes, no accessory muscle use   Cor:   Regular rate and rhythm, S1S2 present or without murmur or extra heart sounds   Abdomen: soft, bowel sounds active, non-tender, no masses or organomegaly   Incisions:   healing well, no drainage, no erythema, no hernia, no seroma, no swelling, no dehiscence, incision well approximated       Assessment:   History of Morbid obesity, status post laparoscopic sleeve gastrectomy. Doing well postoperatively. HTN - off Rx, f/u PCP    Plan:     1. Increase activity to the goal of 30 minutes daily and Increase fluids   2. Patient is cleared to return to work without restrictions. 3. Can stop probiotic    4. Advance diet to solid phase. Reminded to measure portions, continue high protein, low carbohydrate diet. Reminded to eat regularly, to eat slowly & not to drink with meals. Refer to the handbook given in class. 5. Patient has appt with dietician directly after this visit. 6. Continue multivitamin and add the additional vitamin supplementation (Ca, B complex, B12, D, all listed in handbook)  7. Continue current medications and follow up with PCP for management of regimen. 8. Continue cardio exercise and add resistance exercises. Discussed with patient. Minimum of 30 minutes of exercise daily. 9. Encouraged to attend support group   10. I have discussed this plan with patient and they verbalized understanding  11. Follow up in 4 weeks or sooner if patient has questions, concerns or worsening of condition, if unable to reach our office, patient should report to the ED. 15. Ms. Socorro Basurto has a reminder for a \"due or due soon\" health maintenance. I have asked that she contact her primary care provider for a follow-up on this health maintenance.

## 2019-12-03 ENCOUNTER — OFFICE VISIT (OUTPATIENT)
Dept: SURGERY | Age: 49
End: 2019-12-03

## 2019-12-03 VITALS
SYSTOLIC BLOOD PRESSURE: 117 MMHG | HEART RATE: 73 BPM | OXYGEN SATURATION: 100 % | HEIGHT: 61 IN | WEIGHT: 225 LBS | BODY MASS INDEX: 42.48 KG/M2 | TEMPERATURE: 98.2 F | DIASTOLIC BLOOD PRESSURE: 78 MMHG

## 2019-12-03 DIAGNOSIS — Z98.84 S/P LAPAROSCOPIC SLEEVE GASTRECTOMY: ICD-10-CM

## 2019-12-03 DIAGNOSIS — K90.9 INTESTINAL MALABSORPTION, UNSPECIFIED TYPE: Primary | ICD-10-CM

## 2019-12-03 RX ORDER — OMEPRAZOLE 20 MG/1
20 CAPSULE, DELAYED RELEASE ORAL DAILY
Qty: 30 CAP | Refills: 2 | Status: SHIPPED | OUTPATIENT
Start: 2019-12-03 | End: 2020-02-25 | Stop reason: SDUPTHER

## 2019-12-03 RX ORDER — NYSTATIN 100000 U/G
CREAM TOPICAL 2 TIMES DAILY
Qty: 30 G | Refills: 2 | Status: SHIPPED | OUTPATIENT
Start: 2019-12-03

## 2019-12-03 NOTE — PATIENT INSTRUCTIONS
May advance diet to solid phase. Remember to measure portions, to keep the focus on increasing your protein & keeping your carbs low. Continue the use of protein shakes. To follow recommendations of dietician. Stay hydrated! Eat regularly, eat slowly & do not drink with your meals. Vitamins to be taken include your multivitamin, B12, calcium citrate, Vit D & Bcomplex. Refer to your notebook & handouts for dosages. Continue to increase cardio activity. May add resistance exercises. Continue follow-up with your PCP. Return to this office in 1 month. Call if questions/concerns prior to your office visit. Learning About Physical Activity  What is physical activity? Physical activity is any kind of activity that gets your body moving. The types of physical activity that can help you get fit and stay healthy include:  · Aerobic or \"cardio\" activities that make your heart beat faster and make you breathe harder, such as brisk walking, riding a bike, or running. Aerobic activities strengthen your heart and lungs and build up your endurance. · Strength training activities that make your muscles work against, or \"resist,\" something, such as lifting weights or doing push-ups. These activities help tone and strengthen your muscles. · Stretches that allow you to move your joints and muscles through their full range of motion. Stretching helps you be more flexible and avoid injury. What are the benefits of physical activity? Being active is one of the best things you can do to get fit and stay healthy. It helps you to:  · Feel stronger and have more energy to do all the things you like to do. · Focus better at school or work and perform better in sports. · Feel, think, and sleep better. · Reach and stay at a healthy weight. · Lose fat and build lean muscle. · Lower your risk for serious health problems. · Keep your bones, muscles, and joints strong. Being fit lets you do more physical activity. And it lets you work out harder without as much effort. How can you make physical activity part of your life? Get at least 30 minutes of exercise on most days of the week. Walking is a good choice. You also may want to do other activities, such as running, swimming, cycling, or playing tennis or team sports. Pick activities that you like--ones that make your heart beat faster, your muscles stronger, and your muscles and joints more flexible. If you find more than one thing you like doing, do them all. You don't have to do the same thing every day. Get your heart pumping every day. Any activity that makes your heart beat faster and keeps it at that rate for a while counts. Here are some great ways to get your heart beating faster:  · Go for a brisk walk, run, or bike ride. · Go for a hike or swim. · Go in-line skating. · Play a game of touch football, basketball, or soccer. · Ride a bike. · Play tennis or racquetball. · Climb stairs. Even some household chores can be aerobic--just do them at a faster pace. Vacuuming, raking or mowing the lawn, sweeping the garage, and washing and waxing the car all can help get your heart rate up. Strengthen your muscles during the week. You don't have to lift heavy weights or grow big, bulky muscles to get stronger. Doing a few simple activities that make your muscles work against, or \"resist,\" something can help you get stronger. For example, you can:  · Do push-ups or sit-ups, which use your own body weight as resistance. · Lift weights or dumbbells or use stretch bands at home or in a gym or community center. Stretch your muscles often. Stretching will help you as you become more active. It can help you stay flexible, loosen tight muscles, and avoid injury. It can also help improve your balance and posture and can be a great way to relax. Be sure to stretch the muscles you'll be using when you work out.  It's best to warm your muscles slightly before you stretch them. Walk or do some other light aerobic activity for a few minutes, and then start stretching. When you stretch your muscles:  · Do it slowly. Stretching is not about going fast or making sudden movements. · Don't push or bounce during a stretch. · Hold each stretch for at least 15 to 30 seconds, if you can. You should feel a stretch in the muscle, but not pain. · Breathe out as you do the stretch. Then breathe in as you hold the stretch. Don't hold your breath. If you're worried about how more activity might affect your health, have a checkup before you start. Follow any special advice your doctor gives you for getting a smart start. Where can you learn more? Go to http://keon-khoi.info/. Enter Z757 in the search box to learn more about \"Learning About Physical Activity. \"  Current as of: May 5, 2019  Content Version: 12.2  © 2268-6990 [employer]. Care instructions adapted under license by [employer] (which disclaims liability or warranty for this information). If you have questions about a medical condition or this instruction, always ask your healthcare professional. [place] [address] any warranty or liability for your use of this information.

## 2019-12-03 NOTE — PROGRESS NOTES
Yogi Patel presents today for   Chief Complaint   Patient presents with    Follow-up     Pt is here today for her follow up       Is someone accompanying this pt? no    Is the patient using any DME equipment during OV? no    Depression Screening:  3 most recent PHQ Screens 12/3/2019   Little interest or pleasure in doing things Not at all   Feeling down, depressed, irritable, or hopeless Not at all   Total Score PHQ 2 0       Learning Assessment:  Learning Assessment 11/19/2019   PRIMARY LEARNER Patient   HIGHEST LEVEL OF EDUCATION - PRIMARY LEARNER  GRADUATED HIGH SCHOOL OR GED   BARRIERS PRIMARY LEARNER NONE   CO-LEARNER CAREGIVER No   PRIMARY LANGUAGE ENGLISH    NEED No   LEARNER PREFERENCE PRIMARY DEMONSTRATION     -   LEARNING SPECIAL TOPICS no   ANSWERED BY patient   RELATIONSHIP SELF       Abuse Screening:  Abuse Screening Questionnaire 12/3/2019   Do you ever feel afraid of your partner? N   Are you in a relationship with someone who physically or mentally threatens you? N   Is it safe for you to go home? Y       Fall Risk  No flowsheet data found. Coordination of Care:  1. Have you been to the ER, urgent care clinic since your last visit? Hospitalized since your last visit? no    2. Have you seen or consulted any other health care providers outside of the [address] [place] [address] since your last visit? Include any pap smears or colon screening.  no

## 2020-01-07 ENCOUNTER — OFFICE VISIT (OUTPATIENT)
Dept: SURGERY | Age: 50
End: 2020-01-07

## 2020-01-07 VITALS
TEMPERATURE: 97.8 F | OXYGEN SATURATION: 100 % | BODY MASS INDEX: 39.57 KG/M2 | DIASTOLIC BLOOD PRESSURE: 88 MMHG | HEART RATE: 80 BPM | HEIGHT: 61 IN | SYSTOLIC BLOOD PRESSURE: 130 MMHG | WEIGHT: 209.6 LBS

## 2020-01-07 DIAGNOSIS — Z98.84 S/P LAPAROSCOPIC SLEEVE GASTRECTOMY: ICD-10-CM

## 2020-01-07 DIAGNOSIS — K90.9 INTESTINAL MALABSORPTION, UNSPECIFIED TYPE: Primary | ICD-10-CM

## 2020-01-07 RX ORDER — CHOLECALCIFEROL (VITAMIN D3) 125 MCG
CAPSULE ORAL
COMMUNITY

## 2020-01-07 NOTE — PROGRESS NOTES
Subjective:      Andi Hansen is a 51 y.o. female is now 2 months status post laparoscopic sleeve gastrectomy. Doing well overall. She has lost a total of 35 pounds since surgery. Body mass index is 39.6 kg/m². Has lost 29% of EBW. Currently on a solid food diet without difficulty, reports no real issues and denies vomiting, abdominal pain, diarrhea and nausea. Taking in 50 oz water daily. Sources of protein include chicken, fish. Eating -21 meals each day. Has not started structured exercise yet, but knows she needs to do so. Bowel movements are regular. The patient is not having any pain. . The patient is compliant with multivitamins, calcium, Vit D, B complex and B12 supplements.      Weight Loss Metrics 1/7/2020 12/3/2019 11/19/2019 11/5/2019 10/28/2019 10/24/2019 9/20/2019   Today's Wt 209 lb 9.6 oz 225 lb 225 lb 254 lb 4.8 oz 244 lb 244 lb 242 lb   BMI 39.6 kg/m2 42.51 kg/m2 42.51 kg/m2 48.05 kg/m2 46.1 kg/m2 46.1 kg/m2 45.73 kg/m2          Comorbidities:    Hypertension: improved, off Rx  Diabetes: not applicable  Obstructive Sleep Apnea: not applicable  Hyperlipidemia: not applicable  Stress Urinary Incontinence: resolved  Gastroesophageal Reflux: not applicable  Weight related arthropathy:improved     Patient Active Problem List   Diagnosis Code    Cervical herniated disc M50.20    Cervical spondylosis without myelopathy M47.812    Cervical neuritis M54.12    DDD (degenerative disc disease), cervical M50.30    Cervical spinal stenosis M48.02    HNP (herniated nucleus pulposus), cervical M50.20    S/P cervical spinal fusion Z98.1    Obesity, morbid ([facility]) E66.01    Psoriatic arthritis ([address] L40.50    Hypertension I10    HNP (herniated nucleus pulposus) with myelopathy, cervical M50.00    Urinary, incontinence, stress female N39.3    Migraines G43.909    Arthritis M19.90    Morbid obesity ([address] E66.01    Morbid obesity with BMI of 45.0-49.9, adult (HCC) E66.01, Z68.42    Fibroids D21.9    Tingling in extremities R20.2    Brain lesion G93.9    Hx of ventral hernia repair Z98.890, Z87.19    Morbid obesity with BMI of 40.0-44.9, adult ([facility]) E66.01, Z68.41    Intestinal malabsorption K90.9    S/P laparoscopic sleeve gastrectomy Z98.84        Past Medical History:   Diagnosis Date    Arthritis     spinal stenosis    Autoimmune disease ([facility]     psoriatic arthritis    Brain lesion 2019 Dr. Marcela Rogers Fibroids     HNP (herniated nucleus pulposus) with myelopathy, cervical     Hx of ventral hernia repair 2019    By Dr. Spencer Marquez Hypertension 2014    Migraines     Morbid obesity ([facility]     Morbid obesity with BMI of 45.0-49.9, adult ([facility] [facility]     [facility]     Dr. Perico Alrfed    Sleep apnea     being evaluated at this time    Tingling in extremities     Urinary, incontinence, stress female        Past Surgical History:   Procedure Laterality Date    HX  SECTION      x 3    HX CHOLECYSTECTOMY      HX HERNIA REPAIR      x2 umbilical with mesh    HX TUBAL LIGATION      NEUROLOGICAL PROCEDURE UNLISTED  2017    C3-4 Arthroplasty       Current Outpatient Medications   Medication Sig Dispense Refill    cholecalciferol, vitamin D3, (VITAMIN D3) 2,000 unit tab Take  by mouth.  omeprazole (PRILOSEC) 20 mg capsule Take 1 Cap by mouth daily. 30 Cap 2    nystatin (MYCOSTATIN) topical cream Apply  to affected area two (2) times a day. 30 g 2    B.infantis-B.ani-B.long-B.bifi (PROBIOTIC 4X) 10-15 mg TbEC Take  by mouth.  multivitamin with iron (FLINTSTONES) chewable tablet Take 1 Tab by mouth daily.  ondansetron (ZOFRAN ODT) 4 mg disintegrating tablet Take 1 Tab by mouth every six (6) hours as needed for Nausea. 60 Tab 0    minocycline (DYNACIN) 100 mg tablet TAKE 1 TABLET BY MOUTH EVERY DAY  2    clindamycin-benzoyl peroxide (BENZACLIN) 1-5 % topical gel Apply  to affected area.       apremilast 30 mg tab Take 30 mg by mouth two (2) times a day. Indications: psoriasis associated with arthritis      acyclovir (ZOVIRAX) 400 mg tablet Take one tid for 5 days prn cold sores      meclizine (ANTIVERT) 25 mg tablet 25 mg. Indications: sensation of spinning or whirling      triamcinolone (NASACORT AQ) 55 mcg nasal inhaler 1 [place] by Both Nostrils route as needed (Seasonal Allergies).  gabapentin (NEURONTIN) 300 mg capsule Take 1 Cap by mouth three (3) times daily (with meals).  Indications: NEUROPATHIC PAIN 90 Cap 1       Allergies   Allergen Reactions    Fentanyl Nausea and Vomiting    Iodine Hives    Oxycodone Other (comments)    Penicillin G Other (comments)    Phenergan [Promethazine] Nausea and Vomiting         Review of Symptoms:       General - No history or complaints of unexpected fever or chills  Head/Neck - No history or complaints of headache or dizziness  Cardiac - No history or complaints of chest pain, palpitations, or shortness of breath  Pulmonary - No history or complaints of shortness of breath or productive cough  Gastrointestinal - as noted above  Genitourinary - No history or complaints of hematuria/dysuria or renal lithiasis  Musculoskeletal - No history or complaints of joint  muscular weakness  Hematologic - No history of any bleeding episodes  Neurologic - No history or complaints of  migraine headaches or neurologic symptoms      Objective:     Visit Vitals  /88 (BP 1 Location: Left arm, BP Patient Position: Sitting)   Pulse 80   Temp 97.8 °F (36.6 °C)   Ht 5' 1\" (1.549 m)   Wt 95.1 kg (209 lb 9.6 oz)   LMP 01/01/2020 (Exact Date)   SpO2 100%   BMI 39.60 kg/m²       General:  alert, cooperative, no distress, appears stated age   Chest: lungs clear to auscultation, breath sounds equal and symmetric, no rhonchi, rales or wheezes, no accessory muscle use   Cor:   Regular rate and rhythm, S1S2 present or without murmur or extra heart sounds   Abdomen: soft, bowel sounds active, non-tender, no masses or organomegaly   Incisions:   healing well, no drainage, no erythema, no hernia, no seroma, no swelling, no dehiscence, incision well approximated       Assessment:   History of Morbid obesity, status post laparoscopic sleeve gastrectomy. Doing well postoperatively. HTN - off Rx, f/u PCP    Plan:     1. Increase activity to the goal of 30 minutes daily and Follow up with Registered Dietician  2. Reminded to measure portions, continue high protein, low carbohydrate diet. Reminded to eat regularly, to eat slowly & not to drink with meals. 3. Continue vitamin supplementation  4. Continue current medications and follow up with PCP for management of regimen. 5. Continue cardio exercise and resistance exercises. 60-90 min aerobic exercise 5 times a week and strength training 2 days each week. 6. Encouraged to attend support group   7. I have discussed this plan with patient and they verbalized understanding  8. Follow up in 2 months or sooner if patient has questions, concerns or worsening of condition, if unable to reach our office, patient should report to the ED. 5. Ms. Minnette Bumpers has a reminder for a \"due or due soon\" health maintenance. I have asked that she contact her primary care provider for a follow-up on this health maintenance.

## 2020-01-07 NOTE — PROGRESS NOTES
1. Have you been to the ER, urgent care clinic since your last visit? Hospitalized since your last visit? No    2. Have you seen or consulted any other health care providers outside of the [address] [place] [address] since your last visit? Include any pap smears or colon screening.  No        Chief Complaint   Patient presents with    Follow-up

## 2020-01-07 NOTE — PATIENT INSTRUCTIONS
Patient Instructions      1. Remember hydration goals - minimum of 64 ounces of liquids per day (dehydration is the number one reason for hospital readmission). 2. Continue to monitor carbohydrate and protein intake you need a minimum of  Grams of protein daily- remember to keep your total carbohydrates to 50 grams or less per day for best results. 3. Continue to work towards exercise goals - 60-90 minutes, 5 times a week minimum of deliberate, aerobic exercise is the ultimate goal with strength training 2 times each week. Refer to [employer] for  information. 4. Remember to take vitamins as directed. 5. Attend support group the 2nd Thursday of each month. 6. Use Miralax if you become constipated. 7. Call us at [phone] or email us through [email or web address]" with questions,     concerns or worsening of condition, we have someone on call 24 hours a day. If you are unable to reach our office, you are to go to your Primary Care Physician or the Emergency Department. 8. If any labs have been ordered as a part of this visit, you will only be contacted if found to be abnormal. If you would like to look at the results for yourself, you can sign up for 'My Chart\". [facility] office staff can offer you assistance with setting that up. Supplement Resource Guide    Importance of Protein:   Maintains lean body mass, produces antibodies to fight off infections, heals wounds, minimizes hair loss, helps to give you energy, helps with satiety, and keeping you full between meals. Importance of Calcium:  Needed for healthy bones and teeth, normal blood clotting, and nervous system functioning, higher risk of osteoporosis and bone disease with non-compliance. Importance of Multivitamins: Many functions. Supply you with extra nutrients that you may be missing from food.   May lead to iron deficiency anemia, weakness, fatigue, and many other symptoms with non-compliance. Importance of B Vitamins:  Important for red blood cell formation, metabolism, energy, and helps to maintain a healthy nervous system. Protein Supplement  Find one you like now. Use immediately after surgery. Look for:  35-50g protein each day from your protein supplement once you reach the progression diet. 0-3 g fat per serving  0-3 g sugar per serving    Protein drinks should be split in separate dosages. Recommend: Lifelong  1 year + Calcium Supplement:     Start taking within a month after surgery. Look for: Calcium Citrate Plus D (1500 mg per day)  Recommend: Citracal     .            Avoid chocolate chewable calcium. Can use chewable bariatric or [employer] brand or similar chewable. The body cannot absorb more than 500-600 mg of calcium at a time. Take for Life Multi-vitamin Supplement:      Start immediately after surgery: any complete chewable, such as: [employer]s Complete chewables. Avoid [employer] sours or gummies. They lack iron and other important nutrients and also have added sugar. Continue with chewable vitamin or change to adult complete multivitamin one month after surgery. Menstruating women can take a prenatal vitamin. Make sure has at least 18 mg iron and [phone] mcg folic acid   Vitamin T38, B Complex Vitamin, and Biotin  Start taking within a month after surgery. Vitamin B12:  1000 mcg of Vitamin B12 three times weekly    Must take sublingually (meaning you take it under your tongue) or in a liquid drop form for easy absorption. B Complex Vitamin: Take a pill or liquid drop form once daily. Biotin: This vitamin can help prevent hair loss. Recommend 5mg   (5000 mcg) a day  Biotin is Optional              Learning About Physical Activity  What is physical activity? Physical activity is any kind of activity that gets your body moving.   The types of physical activity that can help you get fit and stay healthy include:  · Aerobic or \"cardio\" activities that make your heart beat faster and make you breathe harder, such as brisk walking, riding a bike, or running. Aerobic activities strengthen your heart and lungs and build up your endurance. · Strength training activities that make your muscles work against, or \"resist,\" something, such as lifting weights or doing push-ups. These activities help tone and strengthen your muscles. · Stretches that allow you to move your joints and muscles through their full range of motion. Stretching helps you be more flexible and avoid injury. What are the benefits of physical activity? Being active is one of the best things you can do to get fit and stay healthy. It helps you to:  · Feel stronger and have more energy to do all the things you like to do. · Focus better at school or work and perform better in sports. · Feel, think, and sleep better. · Reach and stay at a healthy weight. · Lose fat and build lean muscle. · Lower your risk for serious health problems. · Keep your bones, muscles, and joints strong. Being fit lets you do more physical activity. And it lets you work out harder without as much effort. How can you make physical activity part of your life? Get at least 30 minutes of exercise on most days of the week. Walking is a good choice. You also may want to do other activities, such as running, swimming, cycling, or playing tennis or team sports. Pick activities that you like--ones that make your heart beat faster, your muscles stronger, and your muscles and joints more flexible. If you find more than one thing you like doing, do them all. You don't have to do the same thing every day. Get your heart pumping every day. Any activity that makes your heart beat faster and keeps it at that rate for a while counts. Here are some great ways to get your heart beating faster:  · Go for a brisk walk, run, or bike ride. · Go for a hike or swim.   · Go in-line skating. · Play a game of touch football, basketball, or soccer. · Ride a bike. · Play tennis or racquetball. · Climb stairs. Even some household chores can be aerobic--just do them at a faster pace. Vacuuming, raking or mowing the lawn, sweeping the garage, and washing and waxing the car all can help get your heart rate up. Strengthen your muscles during the week. You don't have to lift heavy weights or grow big, bulky muscles to get stronger. Doing a few simple activities that make your muscles work against, or \"resist,\" something can help you get stronger. For example, you can:  · Do push-ups or sit-ups, which use your own body weight as resistance. · Lift weights or dumbbells or use stretch bands at home or in a gym or community center. Stretch your muscles often. Stretching will help you as you become more active. It can help you stay flexible, loosen tight muscles, and avoid injury. It can also help improve your balance and posture and can be a great way to relax. Be sure to stretch the muscles you'll be using when you work out. It's best to warm your muscles slightly before you stretch them. Walk or do some other light aerobic activity for a few minutes, and then start stretching. When you stretch your muscles:  · Do it slowly. Stretching is not about going fast or making sudden movements. · Don't push or bounce during a stretch. · Hold each stretch for at least 15 to 30 seconds, if you can. You should feel a stretch in the muscle, but not pain. · Breathe out as you do the stretch. Then breathe in as you hold the stretch. Don't hold your breath. If you're worried about how more activity might affect your health, have a checkup before you start. Follow any special advice your doctor gives you for getting a smart start. Where can you learn more? Go to http://keon-khoi.info/. Enter S019 in the search box to learn more about \"Learning About Physical Activity. \"  Current as of: May 5, 2019  Content Version: 12.2  © 9317-3807 [employer]. Care instructions adapted under license by [employer] (which disclaims liability or warranty for this information). If you have questions about a medical condition or this instruction, always ask your healthcare professional. [address] any warranty or liability for your use of this information.

## 2020-02-25 RX ORDER — OMEPRAZOLE 20 MG/1
20 CAPSULE, DELAYED RELEASE ORAL DAILY
Qty: 30 CAP | Refills: 2 | Status: SHIPPED | OUTPATIENT
Start: 2020-02-25 | End: 2020-05-27 | Stop reason: SDUPTHER

## 2020-03-10 ENCOUNTER — OFFICE VISIT (OUTPATIENT)
Dept: SURGERY | Age: 50
End: 2020-03-10

## 2020-03-10 ENCOUNTER — HOSPITAL ENCOUNTER (OUTPATIENT)
Dept: LAB | Age: 50
Discharge: HOME | End: 2020-03-10

## 2020-03-10 VITALS
SYSTOLIC BLOOD PRESSURE: 131 MMHG | DIASTOLIC BLOOD PRESSURE: 81 MMHG | BODY MASS INDEX: 36.19 KG/M2 | HEART RATE: 85 BPM | OXYGEN SATURATION: 100 % | WEIGHT: 191.7 LBS | TEMPERATURE: 98.6 F | HEIGHT: 61 IN

## 2020-03-10 DIAGNOSIS — L40.50 PSORIATIC ARTHRITIS (HCC): ICD-10-CM

## 2020-03-10 DIAGNOSIS — I10 HYPERTENSION, UNSPECIFIED TYPE: ICD-10-CM

## 2020-03-10 DIAGNOSIS — Z98.84 S/P LAPAROSCOPIC SLEEVE GASTRECTOMY: ICD-10-CM

## 2020-03-10 DIAGNOSIS — E66.01 SEVERE OBESITY (HCC): ICD-10-CM

## 2020-03-10 DIAGNOSIS — K90.9 INTESTINAL MALABSORPTION, UNSPECIFIED TYPE: Primary | ICD-10-CM

## 2020-03-10 LAB — XX-LABCORP SPECIMEN COL,LCBCF: NORMAL

## 2020-03-10 PROCEDURE — 99001 SPECIMEN HANDLING PT-LAB: CPT

## 2020-03-10 RX ORDER — MAGNESIUM 200 MG
1000 TABLET ORAL DAILY
COMMUNITY

## 2020-03-10 RX ORDER — BROMPHENIRAMINE MALEATE, PSEUDOEPHEDRINE HYDROCHLORIDE, AND DEXTROMETHORPHAN HYDROBROMIDE 2; 30; 10 MG/5ML; MG/5ML; MG/5ML
SYRUP ORAL
COMMUNITY
Start: 2020-03-08 | End: 2022-02-18 | Stop reason: ALTCHOICE

## 2020-03-10 NOTE — PROGRESS NOTES
Subjective:      Anthony Meyers is a 51 y.o. female is now 4 months status post laparoscopic sleeve gastrectomy. Doing well overall. She has lost a total of 53 pounds since surgery. Body mass index is 36.22 kg/m². Has lost 43% of EBW. Currently on a solid food diet without difficulty, reports no real issues and denies vomiting, abdominal pain, diarrhea and reflux. Taking in 60oz water daily. Sources of protein include chicken, tuna, ground beef. The patients diet choices have been reviewed today and counseling was given. Protein intake: not tracking, not using protein shakes, doesn't tolerate taste      Meals/day: 3-4     No structured exercise. Bowel movements are regular. The patient is not having any pain. . The patient is compliant with multivitamins, calcium, Vit D and B12 supplements. Has not had a migraine since surgery. Having much less psoriatic arthritis pain, still taking gabapentin and followed regularly by rheumatology and neurology. Denies NSAIDs, steroids. Non-smoker.     Weight Loss Metrics 3/10/2020 1/7/2020 12/3/2019 11/19/2019 11/5/2019 10/28/2019 10/24/2019   Today's Wt 191 lb 11.2 oz 209 lb 9.6 oz 225 lb 225 lb 254 lb 4.8 oz 244 lb 244 lb   BMI 36.22 kg/m2 39.6 kg/m2 42.51 kg/m2 42.51 kg/m2 48.05 kg/m2 46.1 kg/m2 46.1 kg/m2          Comorbidities:    Hypertension: improved, off Rx  Diabetes: not applicable  Obstructive Sleep Apnea: not applicable  Hyperlipidemia: not applicable  Stress Urinary Incontinence: resolved  Gastroesophageal Reflux: not applicable  Weight related arthropathy:improved     Patient Active Problem List   Diagnosis Code    Cervical herniated disc M50.20    Cervical spondylosis without myelopathy M47.812    Cervical neuritis M54.12    DDD (degenerative disc disease), cervical M50.30    Cervical spinal stenosis M48.02    HNP (herniated nucleus pulposus), cervical M50.20    S/P cervical spinal fusion Z98.1    Psoriatic arthritis ([facility] L40.50    Hypertension I10    HNP (herniated nucleus pulposus) with myelopathy, cervical M50.00    Urinary, incontinence, stress female N39.3    Migraines G43.909    Arthritis M19.90    Fibroids D21.9    Tingling in extremities R20.2    Brain lesion G93.9    Hx of ventral hernia repair Z98.890, Z87.19    Intestinal malabsorption K90.9    S/P laparoscopic sleeve gastrectomy Z98.84        Past Medical History:   Diagnosis Date    Arthritis     spinal stenosis    Autoimmune disease ([facility]     psoriatic arthritis    Brain lesion 2019 Dr. Harley Ohara Fibroids     HNP (herniated nucleus pulposus) with myelopathy, cervical     Hx of ventral hernia repair 2019    By Dr. Jona Ferreira Hypertension 2014    Migraines     Morbid obesity ([facility]     Morbid obesity with BMI of 45.0-49.9, adult ([facility]     Psoriatic arthritis ([facility]     Dr. Joan Dodd    Sleep apnea     being evaluated at this time    Tingling in extremities     Urinary, incontinence, stress female        Past Surgical History:   Procedure Laterality Date    HX  SECTION      x 3    HX CHOLECYSTECTOMY      HX HERNIA REPAIR      x2 umbilical with mesh    HX TUBAL LIGATION      NEUROLOGICAL PROCEDURE UNLISTED  2017    C3-4 Arthroplasty       Current Outpatient Medications   Medication Sig Dispense Refill    brompheniramine-pseudoeph-DM (DIMETAPP) 2-30-10 mg/5 mL syrup       cyanocobalamin (VITAMIN B-12) 1,000 mcg sublingual tablet Take 1,000 mcg by mouth daily.  omeprazole (PRILOSEC) 20 mg capsule Take 1 Cap by mouth daily. 30 Cap 2    cholecalciferol, vitamin D3, (VITAMIN D3) 2,000 unit tab Take  by mouth.  nystatin (MYCOSTATIN) topical cream Apply  to affected area two (2) times a day. 30 g 2    B.infantis-B.ani-B.long-B.bifi (PROBIOTIC 4X) 10-15 mg TbEC Take  by mouth.  multivitamin with iron (FLINTSTONES) chewable tablet Take 1 Tab by mouth daily.       ondansetron (ZOFRAN ODT) 4 mg disintegrating tablet Take 1 Tab by mouth every six (6) hours as needed for Nausea. 60 Tab 0    minocycline (DYNACIN) 100 mg tablet TAKE 1 TABLET BY MOUTH EVERY DAY  2    clindamycin-benzoyl peroxide (BENZACLIN) 1-5 % topical gel Apply  to affected area.  apremilast 30 mg tab Take 30 mg by mouth two (2) times a day. Indications: psoriasis associated with arthritis      acyclovir (ZOVIRAX) 400 mg tablet Take one tid for 5 days prn cold sores      meclizine (ANTIVERT) 25 mg tablet 25 mg. Indications: sensation of spinning or whirling      triamcinolone (NASACORT AQ) 55 mcg nasal inhaler 1 [place] by Both Nostrils route as needed (Seasonal Allergies).  gabapentin (NEURONTIN) 300 mg capsule Take 1 Cap by mouth three (3) times daily (with meals).  Indications: NEUROPATHIC PAIN 90 Cap 1       Allergies   Allergen Reactions    Fentanyl Nausea and Vomiting    Iodine Hives    Oxycodone Other (comments)    Penicillin G Other (comments)    Phenergan [Promethazine] Nausea and Vomiting       Review of Symptoms:       General - No history or complaints of unexpected fever or chills  Head/Neck - No history or complaints of headache or dizziness  Cardiac - No history or complaints of chest pain, palpitations, or shortness of breath  Pulmonary - No history or complaints of shortness of breath or productive cough  Gastrointestinal - as noted above  Genitourinary - No history or complaints of hematuria/dysuria or renal lithiasis  Musculoskeletal - No history or complaints of joint  muscular weakness  Hematologic - No history of any bleeding episodes  Neurologic - No history or complaints of  migraine headaches or neurologic symptoms        Objective:     Visit Vitals  /81 (BP 1 Location: Left arm, BP Patient Position: Sitting)   Pulse 85   Temp 98.6 °F (37 °C)   Ht 5' 1\" (1.549 m)   Wt 87 kg (191 lb 11.2 oz)   SpO2 100%   BMI 36.22 kg/m²       General:  alert, cooperative, no distress, appears stated age   Chest: lungs clear to auscultation, breath sounds equal and symmetric, no rhonchi, rales or wheezes, no accessory muscle use   Cor:   Regular rate and rhythm, S1S2 present or without murmur or extra heart sounds   Abdomen: soft, bowel sounds active, non-tender, no masses or organomegaly   Incisions:   healing well, no drainage, no erythema, no hernia, no seroma, no swelling, no dehiscence, incision well approximated       Assessment:   History of Morbid obesity, status post laparoscopic sleeve gastrectomy. Doing well postoperatively. Reviewed general guidelines:  - Exercise a minimum of 30 minutes daily.    - Fluid intake >64oz daily or more of sugar free and caffeine free fluids.  - Sleep goal is 7-9 hours each night. - Strict diet control, patient is to keep carbohydrate consumption <50g daily for additional weight loss. Reminded that dietitian is always a free resource. HTN - off Rx, f/u PCP    Plan:     1. Increase activity to the goal of 30 minutes daily and Increase fluids  2. Discussed patients weight loss goals and dietary choices in relation to goals. 3. Reminded to measure portions, continue high protein, low carbohydrate diet. Reminded to eat regularly, to eat slowly & not to drink with meals. 4. Continue vitamin supplementation  5. Continue current medications and follow up with PCP for management of regimen. 6. Continue cardio exercise and add resistance exercises. 60-90 minutes of aerobic activity 5 days a week and strength training 2 days each week. 7. Encouraged to attend support group   8. Patient to complete labs before next visit. Lab slip given today. 9. I have discussed this plan with patient and they verbalized understanding  10. Follow up in 2 months or sooner if patient has questions, concerns or worsening of condition, if unable to reach our office, patient should report to the ED. 11. Time spent with patient - 25 minutes    12.  Ms. Jayant Warner has a reminder for a \"due or due soon\" health maintenance. I have asked that she contact her primary care provider for a follow-up on this health maintenance.

## 2020-03-10 NOTE — PATIENT INSTRUCTIONS
Patient Instructions 1. Remember hydration goals - minimum of 64 ounces of liquids per day (dehydration is the number one reason for hospital readmission). 2. Continue to monitor carbohydrate and protein intake you need a minimum of  Grams of protein daily- remember to keep your total carbohydrates to 50 grams or less per day for best results. 3. Continue to work towards exercise goals - 60-90 minutes, 5 times a week minimum of deliberate, aerobic exercise is the ultimate goal with strength training 2 times each week. Refer to [employer] for  information. 4. Remember to take vitamins as directed. 5. Attend support group the 2nd Thursday of each month. 6. Use Miralax if you become constipated. 7. Call us at [phone] or email us through [email or web address]" with questions,     concerns or worsening of condition, we have someone on call 24 hours a day. If you are unable to reach our office, you are to go to your Primary Care Physician or the Emergency Department. 8. If any labs have been ordered as a part of this visit, you will only be contacted if found to be abnormal. If you would like to look at the results for yourself, you can sign up for 'My Chart\". [facility] office staff can offer you assistance with setting that up. Supplement Resource Guide Importance of Protein:  
Maintains lean body mass, produces antibodies to fight off infections, heals wounds, minimizes hair loss, helps to give you energy, helps with satiety, and keeping you full between meals. Importance of Calcium: 
Needed for healthy bones and teeth, normal blood clotting, and nervous system functioning, higher risk of osteoporosis and bone disease with non-compliance. Importance of Multivitamins: Many functions. Supply you with extra nutrients that you may be missing from food.   May lead to iron deficiency anemia, weakness, fatigue, and many other symptoms with non-compliance. Importance of B Vitamins: 
Important for red blood cell formation, metabolism, energy, and helps to maintain a healthy nervous system. Protein Supplement Find one you like now. Use immediately after surgery. Look for: 
35-50g protein each day from your protein supplement once you reach the progression diet. 0-3 g fat per serving 0-3 g sugar per serving Protein drinks should be split in separate dosages. Recommend: Lifelong 1 year + Calcium Supplement:  
 
Start taking within a month after surgery. Look for: Calcium Citrate Plus D (1500 mg per day) Recommend: Citracal 
 
 . Avoid chocolate chewable calcium. Can use chewable bariatric or [employer] brand or similar chewable. The body cannot absorb more than 500-600 mg of calcium at a time. Take for Life Multi-vitamin Supplement:   
 
Start immediately after surgery: any complete chewable, such as: [place]s Complete chewables. Avoid [place] sours or gummies. They lack iron and other important nutrients and also have added sugar. Continue with chewable vitamin or change to adult complete multivitamin one month after surgery. Menstruating women can take a prenatal vitamin. Make sure has at least 18 mg iron and 659-212 mcg folic acid Vitamin B12, B Complex Vitamin, and Biotin Start taking within a month after surgery. Vitamin B12:  1000 mcg of Vitamin B12 three times weekly Must take sublingually (meaning you take it under your tongue) or in a liquid drop form for easy absorption. B Complex Vitamin: Take a pill or liquid drop form once daily. Biotin: This vitamin can help prevent hair loss. Recommend 5mg  
(5000 mcg) a day Biotin is Optional  
 
 
 
 
  

## 2020-03-10 NOTE — PROGRESS NOTES
1. Have you been to the ER, urgent care clinic since your last visit? Hospitalized since your last visit?urgent care, 3/8/29 for coughing a sinus issues. 2. Have you seen or consulted any other health care providers outside of the [address] [place] [address] since your last visit? Include any pap smears or colon screening.  No        Chief Complaint   Patient presents with    Follow-up

## 2020-03-13 LAB
25(OH)D3+25(OH)D2 SERPL-MCNC: 45.5 NG/ML (ref 30–100)
ALBUMIN SERPL-MCNC: 4 G/DL (ref 3.8–4.8)
ALBUMIN/GLOB SERPL: 1.3 {RATIO} (ref 1.2–2.2)
ALP SERPL-CCNC: 97 IU/L (ref 39–117)
ALT SERPL-CCNC: 10 IU/L (ref 0–32)
AST SERPL-CCNC: 17 IU/L (ref 0–40)
BASOPHILS # BLD AUTO: 0 X10E3/UL (ref 0–0.2)
BASOPHILS NFR BLD AUTO: 0 %
BILIRUB SERPL-MCNC: 0.3 MG/DL (ref 0–1.2)
BUN SERPL-MCNC: 5 MG/DL (ref 6–24)
BUN/CREAT SERPL: 8 (ref 9–23)
CALCIUM SERPL-MCNC: 9 MG/DL (ref 8.7–10.2)
CHLORIDE SERPL-SCNC: 99 MMOL/L (ref 96–106)
CO2 SERPL-SCNC: 28 MMOL/L (ref 20–29)
CREAT SERPL-MCNC: 0.62 MG/DL (ref 0.57–1)
EOSINOPHIL # BLD AUTO: 0.2 X10E3/UL (ref 0–0.4)
EOSINOPHIL NFR BLD AUTO: 5 %
ERYTHROCYTE [DISTWIDTH] IN BLOOD BY AUTOMATED COUNT: 14.4 % (ref 11.7–15.4)
FERRITIN SERPL-MCNC: 29 NG/ML (ref 15–150)
FOLATE SERPL-MCNC: >20 NG/ML
GLOBULIN SER CALC-MCNC: 3.2 G/DL (ref 1.5–4.5)
GLUCOSE SERPL-MCNC: 86 MG/DL (ref 65–99)
HCT VFR BLD AUTO: 35.9 % (ref 34–46.6)
HGB BLD-MCNC: 11.7 G/DL (ref 11.1–15.9)
IMM GRANULOCYTES # BLD AUTO: 0 X10E3/UL (ref 0–0.1)
IMM GRANULOCYTES NFR BLD AUTO: 0 %
IRON SERPL-MCNC: 40 UG/DL (ref 27–159)
LYMPHOCYTES # BLD AUTO: 3.1 X10E3/UL (ref 0.7–3.1)
LYMPHOCYTES NFR BLD AUTO: 69 %
MCH RBC QN AUTO: 24.1 PG (ref 26.6–33)
MCHC RBC AUTO-ENTMCNC: 32.6 G/DL (ref 31.5–35.7)
MCV RBC AUTO: 74 FL (ref 79–97)
MONOCYTES # BLD AUTO: 0.4 X10E3/UL (ref 0.1–0.9)
MONOCYTES NFR BLD AUTO: 8 %
MORPHOLOGY BLD-IMP: ABNORMAL
NEUTROPHILS # BLD AUTO: 0.8 X10E3/UL (ref 1.4–7)
NEUTROPHILS NFR BLD AUTO: 18 %
PLATELET # BLD AUTO: 241 X10E3/UL (ref 150–450)
POTASSIUM SERPL-SCNC: 3.2 MMOL/L (ref 3.5–5.2)
PROT SERPL-MCNC: 7.2 G/DL (ref 6–8.5)
RBC # BLD AUTO: 4.86 X10E6/UL (ref 3.77–5.28)
SODIUM SERPL-SCNC: 140 MMOL/L (ref 134–144)
VIT B1 BLD-SCNC: 92.5 NMOL/L (ref 66.5–200)
VIT B12 SERPL-MCNC: 1749 PG/ML (ref 232–1245)
WBC # BLD AUTO: 4.5 X10E3/UL (ref 3.4–10.8)

## 2020-03-16 ENCOUNTER — TELEPHONE (OUTPATIENT)
Dept: SURGERY | Age: 50
End: 2020-03-16

## 2020-03-16 RX ORDER — POTASSIUM CHLORIDE 20 MEQ/1
20 TABLET, EXTENDED RELEASE ORAL DAILY
Qty: 30 TAB | Refills: 1 | Status: SHIPPED | OUTPATIENT
Start: 2020-03-16 | End: 2022-02-18 | Stop reason: ALTCHOICE

## 2020-03-16 NOTE — TELEPHONE ENCOUNTER
Spoke with patient about lab results. All WNL except low potassium at 3.2. Had been on diuretic in past, but none currently. No nausea, vomiting. Will send KCl 20 MEq daily  to pharmacy on file, and she will f/u with PCP for repeat testing within 4 weeks.

## 2020-04-28 ENCOUNTER — VIRTUAL VISIT (OUTPATIENT)
Dept: SURGERY | Age: 50
End: 2020-04-28

## 2020-04-28 DIAGNOSIS — K90.9 INTESTINAL MALABSORPTION, UNSPECIFIED TYPE: Primary | ICD-10-CM

## 2020-04-29 VITALS — BODY MASS INDEX: 34.55 KG/M2 | WEIGHT: 183 LBS | HEIGHT: 61 IN

## 2020-04-29 NOTE — PROGRESS NOTES
6 month follow-up / video conference due to CV-19 crisis    Subjective:     Jose A Pederson  is a 51 y.o. female who presents for follow-up about 6 months following laparoscopic sleeve gastrectomy. She has lost a total of 61 pounds since surgery. Body mass index is 34.58 kg/m². . EBWL is (50%). The patient presents today to assess their progress toward their goal of weight loss and to address any issues that may be present. Today the patient and I have reviewed their diet and how appropriate their food choices are. The following issues have been identified - none from a surgical standpoint. Pain assessment - 0/10  . Surgery related complication: NA       She reports no real issues and denies vomiting, abdominal pain, diarrhea and difficulty breathing. The patient's exercise level: moderately active. Changes in her medical history and medications have been reviewed.     Patient Active Problem List   Diagnosis Code    Cervical herniated disc M50.20    Cervical spondylosis without myelopathy M47.812    Cervical neuritis M54.12    DDD (degenerative disc disease), cervical M50.30    Cervical spinal stenosis M48.02    HNP (herniated nucleus pulposus), cervical M50.20    S/P cervical spinal fusion Z98.1    Psoriatic arthritis ([address] L40.50    Hypertension I10    HNP (herniated nucleus pulposus) with myelopathy, cervical M50.00    Urinary, incontinence, stress female N39.3    Migraines G43.909    Arthritis M19.90    Fibroids D21.9    Tingling in extremities R20.2    Brain lesion G93.9    Hx of ventral hernia repair Z98.890, Z87.19    Intestinal malabsorption K90.9    S/P laparoscopic sleeve gastrectomy Z98.84    Severe obesity ([address] E66.01     Past Medical History:   Diagnosis Date    Arthritis     spinal stenosis    Brain lesion 8/22/2019 07/2019 Dr. Ashley Gleason Fibroids     HNP (herniated nucleus pulposus) with myelopathy, cervical     Hx of ventral hernia repair 8/22/2019    By Dr. Malena Conti Hypertension 2014    Migraines     Morbid [facility]     Morbid obesity with BMI of 45.0-49.9, adult ([facility]     Psoriatic arthritis ([facility]     Dr. Benoit Harding    Sleep apnea     being evaluated at this time    Tingling in extremities     Urinary, incontinence, stress female      Past Surgical History:   Procedure Laterality Date    HX  SECTION      x 3    HX CHOLECYSTECTOMY      HX HERNIA REPAIR      x2 umbilical with mesh    HX TUBAL LIGATION      NEUROLOGICAL PROCEDURE UNLISTED  2017    C3-4 Arthroplasty     Current Outpatient Medications   Medication Sig Dispense Refill    potassium chloride (K-DUR, KLOR-CON) 20 mEq tablet Take 1 Tab by mouth daily. Indications: low amount of potassium in the blood 30 Tab 1    brompheniramine-pseudoeph-DM (DIMETAPP) 2-30-10 mg/5 mL syrup       cyanocobalamin (VITAMIN B-12) 1,000 mcg sublingual tablet Take 1,000 mcg by mouth daily.  cholecalciferol, vitamin D3, (VITAMIN D3) 2,000 unit tab Take  by mouth.  nystatin (MYCOSTATIN) topical cream Apply  to affected area two (2) times a day. 30 g 2    B.infantis-B.ani-B.long-B.bifi (PROBIOTIC 4X) 10-15 mg TbEC Take  by mouth.  multivitamin with iron (FLINTSTONES) chewable tablet Take 1 Tab by mouth daily.  minocycline (DYNACIN) 100 mg tablet TAKE 1 TABLET BY MOUTH EVERY DAY  2    clindamycin-benzoyl peroxide (BENZACLIN) 1-5 % topical gel Apply  to affected area.  apremilast 30 mg tab Take 30 mg by mouth two (2) times a day. Indications: psoriasis associated with arthritis      acyclovir (ZOVIRAX) 400 mg tablet Take one tid for 5 days prn cold sores      meclizine (ANTIVERT) 25 mg tablet 25 mg. Indications: sensation of spinning or whirling      triamcinolone (NASACORT AQ) 55 mcg nasal inhaler 1 [place] by Both Nostrils route as needed (Seasonal Allergies).  gabapentin (NEURONTIN) 300 mg capsule Take 1 Cap by mouth three (3) times daily (with meals).  Indications: NEUROPATHIC PAIN 90 Cap 1    omeprazole (PRILOSEC) 20 mg capsule Take 1 Cap by mouth daily. 30 Cap 2    ondansetron (ZOFRAN ODT) 4 mg disintegrating tablet Take 1 Tab by mouth every six (6) hours as needed for Nausea. 60 Tab 0         Review of Symptoms:     General - No history or complaints of unexpected fever or chills  Head/Neck - No history or complaints of headache or dizziness  Cardiac - No history or complaints of chest pain, palpitations, or shortness of breath  Pulmonary - No history or complaints of shortness of breath or productive cough  Gastrointestinal - as noted above  Genitourinary - No history or complaints of hematuria/dysuria or renal lithiasis  Musculoskeletal - No history or complaints of joint  muscular weakness  Hematologic - No history of any bleeding episodes  Neurologic - No history or complaints of  migraine headaches or neurologic symptoms    Objective:     Visit Vitals   5' 1\" (1.549 m)   Wt 83 kg (183 lb)   BMI 34.58 kg/m²        Physical Exam:    Physical Examination: General appearance - alert, well appearing, and in no distress and oriented to person, place, and time  Mental status - alert, oriented to person, place, and time, normal mood, behavior, speech, dress, motor activity, and thought processes  Eyes - pupils equal and reactive, extraocular eye movements intact, sclera anicteric, left eye normal, right eye normal  Ears - right ear normal, left ear normal  Neck- good extension and flexion, no obvious swelling  Chest - good air movement  Heart - N/A  Abdomen - no obvious distension, scars as noted:   Neurological - alert, oriented, normal speech, no focal findings or movement disorder noted  Musculoskeletal - no swelling noted  Extremities - normal movement      Labs:     Recent Results (from the past 2016 hour(s))   [facility] SPECIMEN COL    Collection Time: 03/10/20  4:29 PM   Result Value Ref Range    [facility] SPECIMEN COLLN.         Specimens collected/sent to [facility]. Please direct inquiries to ([phone]). CBC WITH AUTOMATED DIFF    Collection Time: 03/10/20  4:30 PM   Result Value Ref Range    WBC 4.5 3.4 - 10.8 x10E3/uL    RBC 4.86 3.77 - 5.28 x10E6/uL    HGB 11.7 11.1 - 15.9 g/dL    HCT 35.9 34.0 - 46.6 %    MCV 74 (L) 79 - 97 fL    MCH 24.1 (L) 26.6 - 33.0 pg    MCHC 32.6 31.5 - 35.7 g/dL    RDW 14.4 11.7 - 15.4 %    PLATELET 752 306 - 981 x10E3/uL    NEUTROPHILS 18 Not Estab. %    Lymphocytes 69 Not Estab. %    MONOCYTES 8 Not Estab. %    EOSINOPHILS 5 Not Estab. %    BASOPHILS 0 Not Estab. %    ABS. NEUTROPHILS 0.8 (L) 1.4 - 7.0 x10E3/uL    Abs Lymphocytes 3.1 0.7 - 3.1 x10E3/uL    ABS. MONOCYTES 0.4 0.1 - 0.9 x10E3/uL    ABS. EOSINOPHILS 0.2 0.0 - 0.4 x10E3/uL    ABS. BASOPHILS 0.0 0.0 - 0.2 x10E3/uL    IMMATURE GRANULOCYTES 0 Not Estab. %    ABS. IMM. GRANS. 0.0 0.0 - 0.1 x10E3/uL    Hematology comments: Note:    METABOLIC PANEL, COMPREHENSIVE    Collection Time: 03/10/20  4:30 PM   Result Value Ref Range    Glucose 86 65 - 99 mg/dL    BUN 5 (L) 6 - 24 mg/dL    Creatinine 0.62 0.57 - 1.00 mg/dL    GFR est non- >59 mL/min/1.73    GFR est  >59 mL/min/1.73    BUN/Creatinine ratio 8 (L) 9 - 23    Sodium 140 134 - 144 mmol/L    Potassium 3.2 (L) 3.5 - 5.2 mmol/L    Chloride 99 96 - 106 mmol/L    CO2 28 20 - 29 mmol/L    Calcium 9.0 8.7 - 10.2 mg/dL    Protein, total 7.2 6.0 - 8.5 g/dL    Albumin 4.0 3.8 - 4.8 g/dL    GLOBULIN, TOTAL 3.2 1.5 - 4.5 g/dL    A-G Ratio 1.3 1.2 - 2.2    Bilirubin, total 0.3 0.0 - 1.2 mg/dL    Alk.  phosphatase 97 39 - 117 IU/L    AST (SGOT) 17 0 - 40 IU/L    ALT (SGPT) 10 0 - 32 IU/L   VITAMIN B12 & FOLATE    Collection Time: 03/10/20  4:30 PM   Result Value Ref Range    Vitamin B12 1,749 (H) 232 - 1,245 pg/mL    Folate >20.0 >3.0 ng/mL   VITAMIN D, 25 HYDROXY    Collection Time: 03/10/20  4:30 PM   Result Value Ref Range    VITAMIN D, 25-HYDROXY 45.5 30.0 - 100.0 ng/mL   VITAMIN B1, WHOLE BLOOD    Collection Time: 03/10/20  4:30 PM Result Value Ref Range    Vitamin B1 92.5 66.5 - 200.0 nmol/L   IRON    Collection Time: 03/10/20  4:30 PM   Result Value Ref Range    Iron 40 27 - 159 ug/dL   FERRITIN    Collection Time: 03/10/20  4:30 PM   Result Value Ref Range    Ferritin 29 15 - 150 ng/mL       Assessment:     1. History of Morbid obesity, status post  laparoscopic sleeve gastrectomy. Doing well, no concerns. She had her \"6 Month Labs\" drawn in early March as noted above. Plan:     1. Remember to measure portions, continue low carbohydrate diet  2. Lab reviewed with patient and appropriate changes were made to vitamin regimen. 3. Remember vitamin supplements. 4. Exercise regimen appears adequate. 5. Attend support group  6. Follow-up in 3 month(s). 7. Total time spent with the patient 20 minutes. 8. The patient understands the plan of action    This visit with Jada Diego was performed under virtual telemedicine guidelines during the coronavirus (HGASW-70) public health emergency on 4/28/2020 in a video encounter. They understand that this encounter could be a billable service, with coverage determined by their insurance carrier. They are aware that   they may receive a bill and have provided verbal consent for this visit. This visit was performed with the patient in their home environment and provider was   present at [facility]. I have spent over 20 minutes on this visit  both prior to the visits reviewing the patients chart and with the patient oh the phone. I have reviewed their medical history and discussed the plan of action to date. They understand that they will be asked   to come to the office when our office is allowed normal patient interaction, as dictated by public health officials, for a face-to-face visit to rediscuss all of the things we  have talked about today.   During this visit we discussed the varieties of surgeries that we perform, how they would impact the patient from a weight loss standpoint   considering their medical issues and prior surgeries, and also the restrictions that the patient would have long-term with the operation that they have chosen

## 2020-04-29 NOTE — PATIENT INSTRUCTIONS
Body Mass Index: Care Instructions Your Care Instructions Body mass index (BMI) can help you see if your weight is raising your risk for health problems. It uses a formula to compare how much you weigh with how tall you are. · A BMI lower than 18.5 is considered underweight. · A BMI between 18.5 and 24.9 is considered healthy. · A BMI between 25 and 29.9 is considered overweight. A BMI of 30 or higher is considered obese. If your BMI is in the normal range, it means that you have a lower risk for weight-related health problems. If your BMI is in the overweight or obese range, you may be at increased risk for weight-related health problems, such as high blood pressure, heart disease, stroke, arthritis or joint pain, and diabetes. If your BMI is in the underweight range, you may be at increased risk for health problems such as fatigue, lower protection (immunity) against illness, muscle loss, bone loss, hair loss, and hormone problems. BMI is just one measure of your risk for weight-related health problems. You may be at higher risk for health problems if you are not active, you eat an unhealthy diet, or you drink too much alcohol or use tobacco products. Follow-up care is a key part of your treatment and safety. Be sure to make and go to all appointments, and call your doctor if you are having problems. It's also a good idea to know your test results and keep a list of the medicines you take. How can you care for yourself at home? · Practice healthy eating habits. This includes eating plenty of fruits, vegetables, whole grains, lean protein, and low-fat dairy. · If your doctor recommends it, get more exercise. Walking is a good choice. Bit by bit, increase the amount you walk every day. Try for at least 30 minutes on most days of the week. · Do not smoke. Smoking can increase your risk for health problems.  If you need help quitting, talk to your doctor about stop-smoking programs and medicines. These can increase your chances of quitting for good. · Limit alcohol to 2 drinks a day for men and 1 drink a day for women. Too much alcohol can cause health problems. If you have a BMI higher than 25 · Your doctor may do other tests to check your risk for weight-related health problems. This may include measuring the distance around your waist. A waist measurement of more than 40 inches in men or 35 inches in women can increase the risk of weight-related health problems. · Talk with your doctor about steps you can take to stay healthy or improve your health. You may need to make lifestyle changes to lose weight and stay healthy, such as changing your diet and getting regular exercise. If you have a BMI lower than 18.5 · Your doctor may do other tests to check your risk for health problems. · Talk with your doctor about steps you can take to stay healthy or improve your health. You may need to make lifestyle changes to gain or maintain weight and stay healthy, such as getting more healthy foods in your diet and doing exercises to build muscle. Where can you learn more? Go to http://keon-khoi.info/. Enter S176 in the search box to learn more about \"Body Mass Index: Care Instructions. \" Current as of: June 26, 2018 Content Version: 11.8 © 5761-7894 [employer]. Care instructions adapted under license by [facility] (which disclaims liability or warranty for this information). If you have questions about a medical condition or this instruction, always ask your healthcare professional. [address] any warranty or liability for your use of this information.

## 2020-05-27 RX ORDER — OMEPRAZOLE 20 MG/1
20 CAPSULE, DELAYED RELEASE ORAL DAILY
Qty: 30 CAP | Refills: 2 | Status: SHIPPED | OUTPATIENT
Start: 2020-05-27

## 2020-10-20 ENCOUNTER — DOCUMENTATION ONLY (OUTPATIENT)
Dept: SURGERY | Age: 50
End: 2020-10-20

## 2020-10-20 NOTE — LETTER
[facility] 
[address] [place], [address] [facility] 
 
 
Dear Patient, Your health is our main concern. It is important for your health to have follow-up lab work and to see your surgeon at 2 months, 4 months, 6 months, 9 months and annually after your weight loss surgery. Additionally, the Department of Bariatric Surgery at our hospital is a member of the [employer] [address] Surgical Quality Improvement Program ([facility] NSQIP). As a participant in this program, we gather information on the outcomes of our patients after surgery. Please call the office for a follow up appointment at [phone]. If you have moved out of the area or have changed surgeons please call us and let us know the name of your doctor. Your health and feedback are important to us. We greatly appreciate your response. Thank you, [facility]

## 2021-09-24 NOTE — TELEPHONE ENCOUNTER
Your Child's Health  12-Month-Old Visit      Meek Garcia  September 24, 2021    There were no vitals taken for this visit.        Weight:     YOUR CHILD'S 12 MONTH OLD VISIT       Key points at this age…  • Correct use of a car seat is always critically important for your baby’s safety. When you replace the infant car seat, make sure to get a convertible car seat that can be kept rear facing until your child reaches the top height or weight limit allowed by the car seat’s .        • Get your child’s dental health off to a good start by starting regular brushing habits (with regular fluoridated toothpaste--not baby toothpaste) and discontinuing the bottle as soon as possible.    • Consistency is critical right now--consistent bedtime routines will help your baby sleep well and consistent responses to how they are behaving will help them learn what is “right” and what is “wrong”.     NUTRITION:   As your baby’s growth slows down after their first birthday, their appetite may vary from day to day--this is normal. It is also common for them to prefer a few particular foods for days (or weeks!) and to sometimes eat one big meal a day and not really care about eating for the rest of the day. You should simply offer healthy foods in small portions. After eating that, if your child still seems hungry, give them another small portion. Continue to be careful to avoid hard, chunky or chewy foods that your child could choke on. And remember that their likes and dislikes will change rapidly, so don’t be afraid to try again with something that ended up on the floor a couple weeks ago. Keep avoiding sweets, junk food and sweet drinks (fruit drinks, soda)--there’s no reason to start those unhealthy habits this early in life!    A switch from formula to cow’s milk is recommended at age 12 months (if your baby is not sensitive to dairy products).  Whole milk is often recommended at the time of the switch  Called patient and informed her that her prescription has been sent to her local pharmacy [facility] on [address]. Patient verbalized understanding. No further action needed. because of its higher fat content. However, if your child is getting a healthy variety of table foods or if your child is at risk for becoming overweight (a family history of obesity, high blood pressure or heart disease), 2% milk (low fat) is okay. Your child should have between 16 and 24 ounces of milk per day.    If your child is still nursing, you can start supplementing with beverages from a cup to ease the transition to independent drinking.    Eating fruit is much healthier than drinking juice. Even \"natural\" juices have extra sugars that can lead to tooth decay and weight gain. Children between 1 and 3 years of age should have no more than 4 ounces of 100% fruit juice daily. Do not water it down in a bottle or sippy cup that they can carry around and drink from over an extended period of time; this frequent exposure to the sugars in juice (even if diluted with water) just increases their risk of tooth decay.      Most toddlers who eat a varied diet will be getting adequate vitamins-- with the exception of vitamin D. For that reason, a straight vitamin D supplement (with either 400 or 600 IU per dose) or a multivitamin preparation with iron is reasonable. (Liquid preparations are available for infants. They should not be given chewable vitamins because they could choke on those.)      DEVELOPMENT/BEHAVIOR:    Many children at this age are walking (or soon will be). There is a wide range of “normal”, however, and some children will not walk until between 15 and 18 months. (Encourage your child to walk by letting them move around without help and try not to pick them up too often!) At this age they will also be able to drink from a cup, point or gesture to things they are interested in, babble and say 1 or 2 words.     Mobility and increasing independence happen together around this age--this makes things challenging! Not only do they have to be watched more carefully (they can get into more things than they  could before!), but they will start wanting to have their own way more often. So, it is an important time to make sure they understand (or to teach them) that what they choose to do is okay or not okay. (If you laugh when they are hugging their favorite stuffed toy, but you also laugh when they throw food on the floor, they aren’t learning which is right and which is wrong.) Giving immediate, consistent feedback to a young one is the best way to let them know which actions are okay and which are not okay. Keep it simple--long lectures don’t work in this age group. Distracting them from unwanted behaviors by offering something else (another toy, a book, even a snuggle and a hug) can be very effective as well.     Remember, kids want your approval and your positive attention. So give kind words and smiles when they are behaving in a good way. When they are doing something you don’t like, turn away, ignore them or say something simple like “that’s not nice”.  And don’t wait until your child is misbehaving to give them feedback--being proactive (“catch them being good” or “time in”) is the best way for them to learn early what type of behavior will earn your positive attention and smiles. Our busy world and the constant presence of cell phones make it easy to ignore your child when they are behaving well--but that’s exactly the time you should be telling them that they are doing the right thing!    Try to avoid using the word “no” as much as you can. That’s an easy word for them to learn to say back to you, plus it only tells them what you do not want them to do-- it doesn’t really teach them a better option. Try saying “let’s not do this” followed by “let’s do this” more often; save “no” for situations when your baby might be getting in harm’s way. It will mean more if you don’t say it all the time.   DENTAL CARE:   A very important daily routine is teeth brushing. Establish regular times each day for this task,  ideally after breakfast and before bed. As soon as babies have teeth, you should be brushing them twice a day with a tiny smear (the size of a grain of rice!) of regular (fluoridated) toothpaste. (You don’t need to buy baby toothpaste.) Fluoride is very important for your child’s dental health. In addition to brushing with fluoridated toothpaste, your child should be drinking the tap (city) water (which is carefully monitored so it has the ideal amount of fluoride for dental health). If you have well water instead of a city water supply, however, you should have it tested for fluoride content to determine whether your child might need fluoride supplements.    It’s also important to get your child OFF the bottle as soon as possible. At this point, the bottle only causes problems, particularly tooth decay! If your child insists on a bottle, try putting only water in the bottle and everything else in a cup.     CAR SAFETY:   An approved car seat in the back seat of the car is required by [place] law. Your child is safest in a rear-facing car seat, so replace your infant car seat with a convertible car seat that can be kept rear facing until your child reaches the top height or weight limit allowed by the car seat’s .       ACCIDENT PREVENTION:  Setting up your home to be safe can also help shape your child’s behavior. When there are less things that are off limits to them, then you will be saying “no” and redirecting them less often.   1.  Falls: Try to remove furniture with sharp edges which can result in cuts and lacerations for toddling children (who fall frequently!).  Keep lynne on stairs and window latches on upper-story windows.  2.  Burns: Begin to safely teach your child what “hot” and “cold” mean.  Have a family fire safety plan, including regular smoke detector (and carbon monoxide detector) battery checks, working fire extinguishers, and escape routes.  3.  Poisoning: Keep all cleaning and  chemical products safely stored out of sight and out of reach. Pay attention to what is under your bathroom sink as well as your kitchen sink. Keep purses (your own and ones belonging to visitors) out of reach of curious toddlers; they can quickly find medicines, cigarettes, and small objects to choke on!     For all medicines, including vitamins, keep the original safety caps that came with the bottle; do not transfer medicines to non-child-proofed or unlabeled containers. Be especially careful when around older people because they may keep their medicines out in the open or in non-childproofed containers.   Call the Poison Center at 1-621.772.7897 for any known or suspected poisoning (if you haven’t already, put this phone number in your phone for quick reference!).    LEAD EXPOSURE: If there is any lead in your home or yard, crawling and toddling children are at risk for lead poisoning because they are moving around on their own and touching so many things. Lead poisoning can have a harmful and irreversible effect on your child’s behavior, intelligence and learning potential, so it is very important to prevent and screen for lead exposure. If you live in [facility], your child should be screened for lead now (at age 12 months). If you do not live in [facility], talk to your doctor about lead screening if any of the following apply: (1) your child currently (or had previously) lives in or frequently visits a house or building built before 1950 (including , grandparent homes, etc); (2) your child currently (or had previously) lives in or frequently visits a house or building built before 1978 with recent or ongoing renovations; (3) your child has a brother, sister or playmate who has had lead poisoning; (4) your child is enrolled in Medicaid or WIC.  If you have questions about older neighborhoods in [place] that might have lead connecting (or service) pipes, do an internet search for  \"[place] lead awareness and drinking water safety\". From this web page, you can search for your address to find out if your home was built with lead service lines. There are also helpful hints regarding water safety on this site.         SLEEP: Establishing a consistent bedtime routine at this age can help develop good long term sleep habits.   • Create a nightly routine before bed starting with brushing teeth, spending quiet time with your child (read and sing to them), then ending with them soothing themselves to sleep in their crib. Be consistent with your routine!   • Avoid overly stimulating activities before bedtime, including active play, stimulating games or videos.   • A favorite toy and a nightlight are often helpful.   • Make sure your child does not nap too late in the afternoon (they need to be tired at bedtime!).  • Have a consistent “wake up” time as well; this further helps young children develop a regular sleep cycle.      SUN EXPOSURE: Continue to try to protect your baby from direct sun. Keep them in the shade as much as possible and use protective clothing (including hats and sunglasses) when you are out. Always use infant sunscreens with an SPF of 15 or higher to protect from sunburn and long term skin damage; apply it at least 20 to 30 minutes before going out in the sun.    SMOKING:  Continue to protect your child from cigarette smoke. Exposure to cigarette smoke will increase your baby’s risk of asthma, ear infections, and respiratory infections (pneumonia). If you smoke and are ready to consider quitting, talk to your doctor. Nicotine replacement products can be very helpful in breaking this tough addiction.       SHOES:  Children this age need shoes primarily to protect their feet when they start walking outside. When you buy shoes, choose ones that have a roomy fit and flat, flexible soles with nonskid bottoms. And don’t spend too much money--at this age, they outgrow them  quickly!      MEDICATION FOR FEVER OR PAIN:   Acetaminophen liquid (e.g., Tylenol or Tempra) may be given every four hours as needed for pain or fever.  Be sure to check which product CONCENTRATION you are using.    INFANT/CHILDREN’S Tylenol/Acetaminophen  (160 MG/5 mL)  Child’s Weight:            Dose:  12 - 17 pounds:           80 mg (2.5 mL  (1/2 Teaspoon))  18 - 23 pounds:           120 mg (3.75 mL (3/4 Teaspoon))      INFANT Ibuprofen (50 mg/1.25 ml) liquid (for example Advil or Motrin) may be given every 6 hours as needed for pain or fever.  Child’s Weight:            Dose:  12 - 17 pounds:           50 mg (1.25 mL)    18 - 23 pounds:           75 mg (1.875 mL)      CHILDREN'S Ibuprofen (100 mg/5 mL) liquid (for example Advil or Motrin) may be given every 6 hours as needed for pain or fever.  Child’s Weight:            Dose:  12 - 17 pounds:           50 mg (2.5 mL (1/2 Teaspoon))  18 - 23 pounds:           75 mg (3.75  mL (3/4 Teaspoon))  24 - 35 pounds           100 mg (5 mL (1 Teaspoon))    If Meek is outside these weight ranges, call your pediatrician's office for advice.    Most Recent Immunizations   Administered Date(s) Administered   • DTaP/Hep B/IPV 03/10/2021   • Hep B, adolescent or pediatric 2020   • Hib (PRP-OMP) 01/06/2021   • Influenza, injectable, quadrivalent, preservative-free 04/14/2021   • Pneumococcal Conjugate 13 valent 03/10/2021   • Rotavirus - pentavalent 03/10/2021   Pended Date(s) Pended   • Hep A, ped/adol, 2 dose 09/24/2021   • Influenza, injectable, quadrivalent, preservative-free 09/24/2021   • MMR 09/24/2021   • Varicella 09/24/2021   Deferred Date(s) Deferred   • Hep B, adolescent or pediatric 2020       If Meek develops any of the following reactions within 72 hours after an immunization, notify your pediatrician by calling the pediatric phone nurse:  1. A temperature of 105 degrees or above  2. More than 3 hours of continuous crying  3. A shrill, high-pitched  cry  4. A pale, limp spell  5. A seizure or fainting spell.  In this case, you should call 911 or go immediately to the emergency room.    NEXT VISIT:  15 MONTHS OF AGE    Thank you for entrusting your care to [facility].      Also, check out “Children’s Health” on the [facility] Blog for updates on timely topics regarding children’s health!

## 2021-10-19 ENCOUNTER — DOCUMENTATION ONLY (OUTPATIENT)
Dept: SURGERY | Age: 51
End: 2021-10-19

## 2021-10-19 NOTE — PROGRESS NOTES
Per [facility] requirements;  E-mail and letter sent for follow up appointment. [facility]  [address]   [address] [place], [address]                 [facility] [place]  [facility]  [facility]      Dear Patient,    Your health is our main concern. It is important for your health to have follow-up lab work and to see your surgeon at 2 months, 4 months, 6 months, 9 months and annually after your weight loss surgery. Additionally, the Department of Bariatric Surgery at our hospital is a member of the Metabolic and Bariatric Surgery Accreditation and Quality Improvement Program [facility]). As a participant in this program, we gather information on the outcomes of our patients after surgery. Please call the office for a follow up appointment at [phone]. If you have moved out of the area or have changed surgeons please call us and let us know the name of your doctor. Your health and feedback are important to us. We greatly appreciate your response.        Thank you,  [facility] [address] [facility]

## 2022-02-18 ENCOUNTER — HOSPITAL ENCOUNTER (OUTPATIENT)
Dept: LAB | Age: 52
Discharge: HOME | End: 2022-02-18

## 2022-02-18 ENCOUNTER — OFFICE VISIT (OUTPATIENT)
Dept: SURGERY | Age: 52
End: 2022-02-18

## 2022-02-18 VITALS
BODY MASS INDEX: 29.06 KG/M2 | TEMPERATURE: 98.2 F | OXYGEN SATURATION: 100 % | HEART RATE: 85 BPM | DIASTOLIC BLOOD PRESSURE: 77 MMHG | SYSTOLIC BLOOD PRESSURE: 116 MMHG | WEIGHT: 153.8 LBS

## 2022-02-18 DIAGNOSIS — Z98.84 S/P LAPAROSCOPIC SLEEVE GASTRECTOMY: ICD-10-CM

## 2022-02-18 DIAGNOSIS — K90.9 INTESTINAL MALABSORPTION, UNSPECIFIED TYPE: Primary | ICD-10-CM

## 2022-02-18 DIAGNOSIS — D64.9 ANEMIA, UNSPECIFIED TYPE: ICD-10-CM

## 2022-02-18 LAB
SENTARA SPECIMEN COL,SENBCF: NORMAL

## 2022-02-18 PROCEDURE — 99001 SPECIMEN HANDLING PT-LAB: CPT

## 2022-02-18 PROCEDURE — 99214 OFFICE O/P EST MOD 30 MIN: CPT | Performed by: NURSE PRACTITIONER

## 2022-02-18 RX ORDER — SPIRONOLACTONE 50 MG/1
TABLET, FILM COATED ORAL
COMMUNITY
Start: 2021-12-22

## 2022-02-18 RX ORDER — CALCIUM CARBONATE/VITAMIN D3 600 MG-125
TABLET ORAL
COMMUNITY

## 2022-02-18 RX ORDER — TRETINOIN 0.5 MG/G
CREAM TOPICAL
COMMUNITY
Start: 2022-02-11

## 2022-02-18 NOTE — PROGRESS NOTES
Subjective:     Surendra Palma  is a 49 y.o. female who presents for follow-up about 2.25 years following laparoscopic sleeve gastrectomy. She has lost a total of 91 pounds since surgery. Body mass index is 29.06 kg/m². . EBWL is (75%). The patient presents today to assess their progress toward their goal of weight loss and to address any issues that may be present. Today the patient and I have reviewed their diet and how appropriate their food choices are. The following issues have been identified - none from a surgical standpoint. Not seen since 6 months postop April 2020 which was a virtual visit. She had lost 61 lbs (55% EBWL). Feeling well, no complaints or concerns. Surgery related complication: NA       She reports skin irritation under pannus that is controlled with neosporin. Previously prescribed nystatin did not help and she is not interested in pursuing panniculectomy at this time. She denies vomiting, abdominal pain, difficulty swallowing, nausea and reflux. The patients diet choices have been reviewed today and counseling was given. Fluid intake: fair, 40 oz      Protein intake: occ gatorade protein + food; tolerating all proteins      Meals/day: 2-3  Eating oatmeal, steamables    Patients pain score:0/10    The patient's exercise level: exercises 2-3 times a week, walking for 30 minutes. Changes in her medical history and medications have been reviewed.     Comorbidities:    Hypertension: resolved  Diabetes: not applicable  Obstructive Sleep Apnea: not applicable  Hyperlipidemia: not applicable  Stress Urinary Incontinence: resolved  Gastroesophageal Reflux: not applicable  Weight related arthropathy:improved    Patient Active Problem List   Diagnosis Code    Cervical herniated disc M50.20    Cervical spondylosis without myelopathy M47.812    Cervical neuritis M54.12    DDD (degenerative disc disease), cervical M50.30    Cervical spinal stenosis M48.02    HNP (herniated nucleus pulposus), cervical M50.20    S/P cervical spinal fusion Z98.1    Psoriatic arthritis (HCC) L40.50    Hypertension I10    HNP (herniated nucleus pulposus) with myelopathy, cervical M50.00    Urinary, incontinence, stress female N39.3    Migraines G43.909    Arthritis M19.90    Fibroids D21.9    Tingling in extremities R20.2    Brain lesion G93.9    Hx of ventral hernia repair Z98.890, Z87.19    Intestinal malabsorption K90.9    S/P laparoscopic sleeve gastrectomy Z98.84    Severe obesity ([address] E66.01     Past Medical History:   Diagnosis Date    Arthritis     spinal stenosis    Brain lesion 2019 Dr. Lily Curtis Fibroids     HNP (herniated nucleus pulposus) with myelopathy, cervical     Hx of ventral hernia repair 2019    By Dr. Jonnie Aarnda Hypertension 2014    Migraines     Morbid obesity ([address]     Morbid obesity with BMI of 45.0-49.9, adult ([address]     Psoriatic arthritis ([facility]     Dr. Jocelyn Rondon    Sleep apnea     being evaluated at this time    Tingling in extremities     Urinary, incontinence, stress female      Past Surgical History:   Procedure Laterality Date    HX  SECTION      x 3    HX CHOLECYSTECTOMY      HX HERNIA REPAIR      x2 umbilical with mesh    HX TUBAL LIGATION      NEUROLOGICAL PROCEDURE UNLISTED  2017    C3-4 Arthroplasty     Current Outpatient Medications   Medication Sig Dispense Refill    tretinoin (RETIN-A) 0.05 % topical cream APPLY PEA SIZED AMOUNT TOPICALLY TO FACE EVERY NIGHT AT BEDTIME      spironolactone (ALDACTONE) 50 mg tablet TAKE 2 TABLETS BY MOUTH EVERY NIGHT AT BEDTIME      vit B Cmplx 3-FA-Vit C-Biotin (NEPHRO APOLONIA RX) 1- mg-mg-mcg tablet Take 1 Tablet by mouth daily.  calcium-cholecalciferol, d3, (CALCIUM 600 + D) 600-125 mg-unit tab Take  by mouth.  omeprazole (PRILOSEC) 20 mg capsule Take 1 Cap by mouth daily.  30 Cap 2    cyanocobalamin (VITAMIN B-12) 1,000 mcg sublingual tablet Take 1,000 mcg by mouth daily.  cholecalciferol, vitamin D3, (VITAMIN D3) 2,000 unit tab Take  by mouth.  nystatin (MYCOSTATIN) topical cream Apply  to affected area two (2) times a day. 30 g 2    multivitamin with iron (FLINTSTONES) chewable tablet Take 1 Tab by mouth daily.  ondansetron (ZOFRAN ODT) 4 mg disintegrating tablet Take 1 Tab by mouth every six (6) hours as needed for Nausea. 60 Tab 0    minocycline (DYNACIN) 100 mg tablet TAKE 1 TABLET BY MOUTH EVERY DAY  2    clindamycin-benzoyl peroxide (BENZACLIN) 1-5 % topical gel Apply  to affected area.  apremilast 30 mg tab Take 30 mg by mouth two (2) times a day. Indications: psoriasis associated with arthritis      acyclovir (ZOVIRAX) 400 mg tablet Take one tid for 5 days prn cold sores      meclizine (ANTIVERT) 25 mg tablet 25 mg. Indications: sensation of spinning or whirling      triamcinolone (NASACORT AQ) 55 mcg nasal inhaler 1 [place] by Both Nostrils route as needed (Seasonal Allergies).  gabapentin (NEURONTIN) 300 mg capsule Take 1 Cap by mouth three (3) times daily (with meals).  Indications: NEUROPATHIC PAIN 90 Cap 1        Review of Symptoms:       General - No history or complaints of unexpected fever or chills  Head/Neck - No history or complaints of headache or dizziness  Cardiac - No history or complaints of chest pain, palpitations, or shortness of breath  Pulmonary - No history or complaints of shortness of breath or productive cough  Gastrointestinal - as noted above  Genitourinary - No history or complaints of hematuria/dysuria or renal lithiasis  Musculoskeletal - No history or complaints of joint  muscular weakness  Hematologic - No history of any bleeding episodes  Neurologic - No history or complaints of  migraine headaches or neurologic symptoms                     Objective:     Visit Vitals  /77 (BP 1 Location: Left upper arm, BP Patient Position: Sitting, BP Cuff Size: Large adult)   Pulse 85   Temp 98.2 °F (36.8 °C) Wt 69.8 kg (153 lb 12.8 oz)   SpO2 100%   BMI 29.06 kg/m²        Physical Exam:      General appearance:  alert, cooperative, no distress, appears stated age   Mental status   alert, oriented to person, place, and time   Neck  supple, no significant adenopathy     Lymphatics  no palpable lymphadenopathy, no hepatosplenomegaly   Chest  clear to auscultation, no wheezes, rales or rhonchi, symmetric air entry   Heart  normal rate, regular rhythm, normal S1, S2, no murmurs, rubs, clicks or gallops    Abdomen: soft, nontender, nondistended, no masses or organomegaly   Incision:  Well healed, no hernias      Neurological  alert, oriented, normal speech, no focal findings or movement disorder noted   Musculoskeletal no joint tenderness, deformity or swelling   Extremities peripheral pulses normal, no pedal edema, no clubbing or cyanosis   Skin normal coloration and turgor, no rashes, no suspicious skin lesions noted          Lab Results   Component Value Date/Time    WBC 4.5 03/10/2020 04:30 PM    HGB 11.7 03/10/2020 04:30 PM    HCT 35.9 03/10/2020 04:30 PM    PLATELET 614 68/94/8207 04:30 PM    MCV 74 (L) 03/10/2020 04:30 PM     Lab Results   Component Value Date/Time    Sodium 140 03/10/2020 04:30 PM    Potassium 3.2 (L) 03/10/2020 04:30 PM    Chloride 99 03/10/2020 04:30 PM    CO2 28 03/10/2020 04:30 PM    Anion gap 4 03/10/2017 04:02 PM    Glucose 86 03/10/2020 04:30 PM    BUN 5 (L) 03/10/2020 04:30 PM    Creatinine 0.62 03/10/2020 04:30 PM    BUN/Creatinine ratio 8 (L) 03/10/2020 04:30 PM    GFR est  03/10/2020 04:30 PM    GFR est non- 03/10/2020 04:30 PM    Calcium 9.0 03/10/2020 04:30 PM    Bilirubin, total 0.3 03/10/2020 04:30 PM    Alk.  phosphatase 97 03/10/2020 04:30 PM    Protein, total 7.2 03/10/2020 04:30 PM    Albumin 4.0 03/10/2020 04:30 PM    Globulin 3.6 03/10/2017 04:02 PM    A-G Ratio 1.3 03/10/2020 04:30 PM    ALT (SGPT) 10 03/10/2020 04:30 PM     Lab Results   Component Value Date/Time Iron 40 03/10/2020 04:30 PM    Ferritin 29 03/10/2020 04:30 PM     Lab Results   Component Value Date/Time    Folate >20.0 03/10/2020 04:30 PM     Lab Results   Component Value Date/Time    VITAMIN D, 25-HYDROXY 45.5 03/10/2020 04:30 PM         Recent Labs     03/10/20  1630   VITD3 45.5     Reviewed labs in Care Everywhere from 10/7/21:  Vitamin D 40.7  Hgb 11.6  Hct 39.1  Crt 0.6  LFTs WNL    Assessment and Plan:   1. Intestinal malabsorption  a. continue required Vitamins: B12, B complex, D, iron, calcium, multivitamin  2. S/p laparoscopic bariatric surgery, SLEEVE GASTRECTOMY, history of morbid obesity. Has done well with maintaining her weight loss and transitioned to maintenance phase. Gave her handout to reinforce dietary guidelines we reviewed today and continue to focus on adequate protein intake. a. Sleep goal is 7-9 hours each night. Patient education given on the effects of sleep deprivation on weight control. b. Discussed patients weight loss goals and dietary choices in relation to goals. c. Reminded to measure portions, continue high protein, low carbohydrate diet. Reminded to eat regularly, to eat slowly & not to drink with meals. d. Continue cardio exercise and add resistance exercises. 60-90 minutes of aerobic activity 5 days a week and strength training 2 days each week. e. Encouraged to attend support group   f. Required fluid intake is >64oz daily of decaffeinated sugar free beverages. 3. Anemia - check iron and ferritin level    Labs ordered today  Follow up in 9 months or sooner if patient has questions, concerns or worsening of condition, if unable to reach our office, patient should report to the ED. Ms. Elver Hobbs has a reminder for a \"due or due soon\" health maintenance. I have asked that she contact her primary care provider for a follow-up on this health maintenance. Total time spent with the patient 30 minutes.

## 2022-02-18 NOTE — PATIENT INSTRUCTIONS
Patient Instructions      1. Remember hydration goals - minimum of 64 ounces of liquids per day (dehydration is the number one reason for hospital readmission). 2. Continue to monitor carbohydrate and protein intake you need a minimum of  Grams of protein daily- remember to keep your total carbohydrates to 50 grams or less per day for best results. 3. Continue to work towards exercise goals - 60-90 minutes, 5 times a week minimum of deliberate, aerobic exercise is the ultimate goal with strength training 2 times each week. Refer to [employer] [facility] [employer] for  information. 4. Remember to take vitamins as directed. 5. Attend support group the 2nd Thursday of each month. 6.  Constipation: Milk of Magnesia is for immediate relief only. Miralax is to be used every day if constipation is a chronic problem. 7.  Diarrhea: patients will occasionally develop lactose intolerance after surgery. Check to see if your protein shake has whey in it. If it does try a protein powder or drink that does not have whey and stop all yogurts, cheeses and milks to see if the diarrhea goes away. 8.  If you have had labs drawn. We will only call you if you have abnormal results. Otherwise you can access the lab results in \"mychart\". You will only need the access code the first time you sign on. 9.  Call us at [phone] or email us through [email or web address]" with questions,     concerns or worsening of condition, we have someone on call 24 hours a day. If you are unable to reach our office, you are to go to your Primary Care Physician or the Emergency Department.      NOTE TO GASTRIC BYPASS PATIENTS:  (SAME APPLIES TO GASTRIC SLEEVE PATIENTS FOR FIRST TWO MONTHS)  Remember that for the rest of your life, you are not able to take the following:  - NSAIDs (ibuprofen, goody powder, BC powder, Motrin, Advil, Mobic, Voltaren, Excedrin, etc.)  - Steroid pills or injections  - Smoke (cigarettes or recreational drugs)  - Alcohol  Use of any of the above may cause ulcers in your stomach which may perforate causing a medical emergency and surgery. Speak to our medical staff if another medical provider requires you to take steroids or NSAIDs. Supplement Resource Guide    Importance of Protein:   Maintains lean body mass, produces antibodies to fight off infections, heals wounds, minimizes hair loss, helps to give you energy, helps with satiety, and keeping you full between meals. Importance of Calcium:  Needed for healthy bones and teeth, normal blood clotting, and nervous system functioning, higher risk of osteoporosis and bone disease with non-compliance. Importance of Multivitamins: Many functions. Supply you with extra nutrients that you may be missing from food. May lead to iron deficiency anemia, weakness, fatigue, and many other symptoms with non-compliance. Importance of B Vitamins:  Important for red blood cell formation, metabolism, energy, and helps to maintain a healthy nervous system. Protein Supplement  Find one you like now. Use immediately after surgery. Look for:  35-50g protein each day from your protein supplement once you reach the progression diet. 0-3 g fat per serving  0-3 g sugar per serving    Protein drinks should be split in separate dosages. Recommend: Lifelong  1 year + Calcium Supplement:     Start taking within a month after surgery. Look for: Calcium Citrate Plus D (1500 mg per day)  Recommend: Citracal     .            Avoid chocolate chewable calcium. Can use chewable bariatric or [employer] brand or similar chewable. The body cannot absorb more than 500-600 mg of calcium at a time. Take for Life Multi-vitamin Supplement:      Start immediately after surgery: any complete chewable, such as: Marcelluss Complete chewables. Avoid Marcellus sours or gummies.   They lack iron and other important nutrients and also have added sugar. Continue with chewable vitamin or change to adult complete multivitamin one month after surgery. Menstruating women can take a prenatal vitamin. Make sure has at least 18 mg iron and 395-381 mcg folic acid   Vitamin V72, B Complex Vitamin, and Biotin  Start taking within a month after surgery. Vitamin B12:  1000 mcg of Vitamin B12 three times weekly    Must take sublingually (meaning you take it under your tongue) or in a liquid drop form for easy absorption. B Complex Vitamin: Take a pill or liquid drop form once daily. Biotin: This vitamin can help prevent hair loss. Recommend 5mg   (5000 mcg) a day  Biotin is Optional              Learning About Being Physically Active  What is physical activity? Being physically active means doing any kind of activity that gets your body moving. The types of physical activity that can help you get fit and stay healthy include:  · Aerobic or \"cardio\" activities. These make your heart beat faster and make you breathe harder, such as brisk walking, riding a bike, or running. They strengthen your heart and lungs and build up your endurance. · Strength training activities. These make your muscles work against, or \"resist,\" something. Examples include lifting weights or doing push-ups. These activities help tone and strengthen your muscles and bones. · Stretches. These let you move your joints and muscles through their full range of motion. Stretching helps you be more flexible. What are the benefits of being active? Being active is one of the best things you can do for your health. It helps you to:  · Feel stronger and have more energy to do all the things you like to do. · Focus better at school or work. · Feel, think, and sleep better. · Reach and stay at a healthy weight. · Lose fat and build lean muscle.   · Lower your risk for serious health problems, including diabetes, heart attack, high blood pressure, and some cancers. · Keep your heart, lungs, bones, muscles, and joints strong and healthy. How can you make being active part of your life? Start slowly. Make it your long-term goal to get at least 30 minutes of exercise on most days of the week. Walking is a good choice. You also may want to do other activities, such as running, swimming, cycling, or playing tennis or team sports. Pick activities that you likeones that make your heart beat faster, your muscles stronger, and your muscles and joints more flexible. If you find more than one thing you like doing, do them all. You don't have to do the same thing every day. Get your heart pumping every day. Any activity that makes your heart beat faster and keeps it at that rate for a while counts. Here are some great ways to get your heart beating faster:  · Go for a brisk walk, run, or bike ride. · Go for a hike or swim. · Go in-line skating. · Play a game of touch football, basketball, or soccer. · Ride a bike. · Play tennis or racquetball. · Climb stairs. Even some household chores can be aerobicjust do them at a faster pace. Vacuuming, raking or mowing the lawn, sweeping the garage, and washing and waxing the car all can help get your heart rate up. Strengthen your muscles during the week. You don't have to lift heavy weights or grow big, bulky muscles to get stronger. Doing a few simple activities that make your muscles work against, or \"resist,\" something can help you get stronger. For example, you can:  · Do push-ups or sit-ups, which use your own body weight as resistance. · Lift weights or dumbbells or use stretch bands at home or in a gym or community center. Stretch your muscles often. Stretching will help you as you become more active. It can help you stay flexible, loosen tight muscles, and avoid injury. It can also help improve your balance and posture and can be a great way to relax.   Be sure to stretch the muscles you'll be using when you work out. It's best to warm your muscles slightly before you stretch them. Walk or do some other light aerobic activity for a few minutes, and then start stretching. When you stretch your muscles:  · Do it slowly. Stretching is not about going fast or making sudden movements. · Don't push or bounce during a stretch. · Hold each stretch for at least 15 to 30 seconds, if you can. You should feel a stretch in the muscle, but not pain. · Breathe out as you do the stretch. Then breathe in as you hold the stretch. Don't hold your breath. If you're worried about how more activity might affect your health, have a checkup before you start. Follow any special advice your doctor gives you for getting a smart start. Where can you learn more? Go to http://www.gray.com/  Enter [id number] in the search box to learn more about \"Learning About Being Physically Active. \"  Current as of: May 12, 2021               Content Version: 13.0  © 2006-2021 [employer]. Care instructions adapted under license by "[employer] (which disclaims liability or warranty for this information). If you have questions about a medical condition or this instruction, always ask your healthcare professional. [address] any warranty or liability for your use of this information.

## 2022-02-21 LAB
FERRITIN SERPL-MCNC: 26 NG/ML (ref 10–291)
FOLATE,FOL: 18.1 NG/ML
IRON,IRN: 57 MCG/DL (ref 30–160)
VIT B12 SERPL-MCNC: 566 PG/ML (ref 211–911)

## 2022-02-23 NOTE — PROGRESS NOTES
Hi Ms Wangson Adele,  Just wanted to let you know that overall your labs are good. Your B12 is a little lower than I like. Be sure you are taking 1000 mcg every day, preferably the kind that is absorbed in your mouth. Keep taking all other vitamins and supplements as you have been. Feel free to call the office with any questions.   Take care,  Sumi De La Cruz, FNP-BC

## 2022-03-02 LAB — VITAMIN B1, WHOLE BLOOD, 66250: 87.4 NMOL/L (ref 66.5–200)

## 2022-03-18 PROBLEM — M47.812 CERVICAL SPONDYLOSIS WITHOUT MYELOPATHY: Status: ACTIVE | Noted: 2017-01-23

## 2022-03-18 PROBLEM — Z98.1 S/P CERVICAL SPINAL FUSION: Status: ACTIVE | Noted: 2017-03-30

## 2022-03-18 PROBLEM — M48.02 CERVICAL SPINAL STENOSIS: Status: ACTIVE | Noted: 2017-02-24

## 2022-03-18 PROBLEM — D64.9 ANEMIA: Status: ACTIVE | Noted: 2022-02-18

## 2022-03-18 PROBLEM — M50.20 HNP (HERNIATED NUCLEUS PULPOSUS), CERVICAL: Status: ACTIVE | Noted: 2017-02-24

## 2022-03-19 PROBLEM — M54.12 CERVICAL NEURITIS: Status: ACTIVE | Noted: 2017-01-23

## 2022-03-19 PROBLEM — M19.90 ARTHRITIS: Status: ACTIVE | Noted: 2019-05-06

## 2022-03-19 PROBLEM — Z98.890 HX OF VENTRAL HERNIA REPAIR: Status: ACTIVE | Noted: 2019-08-22

## 2022-03-19 PROBLEM — G93.9 BRAIN LESION: Status: ACTIVE | Noted: 2019-08-22

## 2022-03-19 PROBLEM — Z98.84 S/P LAPAROSCOPIC SLEEVE GASTRECTOMY: Status: ACTIVE | Noted: 2019-11-18

## 2022-03-19 PROBLEM — Z87.19 HX OF VENTRAL HERNIA REPAIR: Status: ACTIVE | Noted: 2019-08-22

## 2022-03-19 PROBLEM — M50.20 CERVICAL HERNIATED DISC: Status: ACTIVE | Noted: 2017-01-23

## 2022-03-19 PROBLEM — K90.9 INTESTINAL MALABSORPTION: Status: ACTIVE | Noted: 2019-11-18

## 2022-03-19 PROBLEM — M50.30 DDD (DEGENERATIVE DISC DISEASE), CERVICAL: Status: ACTIVE | Noted: 2017-01-23

## 2022-03-20 PROBLEM — E66.01 SEVERE OBESITY (HCC): Status: ACTIVE | Noted: 2020-03-10

## 2022-10-11 ENCOUNTER — DOCUMENTATION ONLY (OUTPATIENT)
Dept: SURGERY | Age: 52
End: 2022-10-11

## 2022-10-11 NOTE — PROGRESS NOTES
Per [facility] requirements;  E-mail and letter sent for follow up appointment. [facility]  [address] [facility], [address] [facility]                 [facility] [place]  [address] [facility]  [facility]      Dear Patient,    Your health is our main concern. It is important for your health to have follow-up lab work and to see your surgeon at 3 months, 6 months, 9 months and annually after your weight loss surgery. Additionally, the Department of Bariatric Surgery at our hospital is a member of the Metabolic and Bariatric Surgery Accreditation and Quality Improvement Program [facility]). As a participant in this program, we gather information on the outcomes of our patients after surgery. Please call the office for a follow up appointment at [phone]. If you have moved out of the area or have changed surgeons please call us and let us know the name of your doctor. Your health and feedback are important to us. We greatly appreciate your response.        Thank you,  [facility] [address] [facility]

## 2023-10-05 NOTE — NURSE NAVIGATOR
Per MBSAQIP requirements:  Letter and email sent requesting follow up appointment. [facility]  [facility]   [address], [address] [facility] [place]  [facility]  [facility]      Dear Patient,    Your health is our main concern. It is important for your health to have follow-up lab work and to see your surgeon at 3 months, 6 months, 9 months and annually after your weight loss surgery. Additionally, the Department of Bariatric Surgery at our hospital is a member of the Metabolic and Bariatric Surgery Accreditation and Quality Improvement Program [facility]). As a participant in this program, we gather information on the outcomes of our patients after surgery. Please call the office for a follow up appointment at [phone]. If you have moved out of the area or have changed surgeons please call us and let us know the name of your doctor. Your health and feedback are important to us. We greatly appreciate your response.        Thank you,  [facility] [place] [facility] [address]

## 2025-04-28 NOTE — TELEPHONE ENCOUNTER
Called [facility] on 5S and informed of Zanaflex RX. VORB ENTERED PER NP WINSTON RIZZO FOR ZANAFLEX 60 MG 1 TAB PO TID PRN SPASMS #90 RF #0 (e-scribed to pharmacy on file). Physical Therapy Discharge    Visit Type: Discharge Summary  Visit: 5  Referring Provider: Napoleon English MD  Medical Diagnosis (from order): M25.512, G89.29 - Chronic left shoulder pain     SUBJECTIVE                                                                                                               Patient states the shoulder was really cranky and painful this morning.  She states she had a big knot in the upper left shoulder.  She states the most effective exercises for her seem to be the arm bike motion.  She states she is not sure that she has not progressed significantly in the past week.  Overall, she feels like it has been helpful but there are a few warning things like adjusting the covers at night.   Functional Change: See progress made toward long term goals and score on functional outcome tool   Current Functional Limitations: Still difficulty with prolonged reading, rolling over in the bed and pulling the covers      Pain / Symptoms  - Pain rating (out of 10): Current: 0 ; Best: 0; Worst: 7 (1st thing in morning prior to moving)      OBJECTIVE                                                                                                                     Range of Motion (ROM)   (degrees unless noted; active unless noted; norms in ( ); negative=lacking to 0, positive=beyond 0)  Shoulder:    - Flexion (180):        • Left:162      - Extension (50):        • Left: 35 pain    - Abduction (180):        • Left: 165    - Internal Rotation:        - Behind Back:           • Left: T8    - External Rotation:       - Behind the Head:           • Left: T4     Strength  (out of 5 unless noted, standard test position unless noted)   Shoulder:     - Abduction:        • Right: 4+    - Scaption:         • Right: 5     - Internal Rotation:          • Right (at 0°): 5    - External Rotation:         • Right (at 0°): 5         Palpation  Left  - Upper Trapezius: hypertonic and tenderness  - Pectoralis Minor:  hypertonic  - Pectoralis Major: hypertonic  Right  - Upper Trapezius: hypertonic and tenderness  - Pectoralis Minor: hypertonic  - Pectoralis Major: hypertonic  Shoulder: Ceres/Tendon/Bone  - Anterior Shoulder: - Left: tenderness - Right: tenderness           Outcome/Assessments  Outcome Measures:   Quick Disabilities of the Arm, Shoulder and Hand: QuickDash Total Score (Score will not calculate if more then 2 questions are left blank): 31.82   (scored 0-100; a higher score indicates greater disability) see flowsheet for additional documentation     Treatment     Therapeutic Exercise  upper body ergometer  2/2  Pulley scaption x 2 minutes   Standing open book 5 x 10 seconds   Wall D 10x    Tests and measurements for discharge    Manual Therapy   Soft tissue mobilization left posterior shoulder and upper trapezius, supraspinatus   Posterior glenohumeral joint mobilization left shoulder     Therapeutic Activity        Neuromuscular Re-Education  Below reviewed as part of home exercise program but not completed in session today.   All exercise require education and cueing in scapular positioning and inhibition of upper trapezius.   Standing bilateral External rotation 1lb  10x  Standing bilateral scaption 1lb 10x  At wall serratus protraction and retraction 10x   Wall push up + 15x  Ball depression in standing 10 x 5 seconds  Seated yoga block scapular depression 10x 5 seconds  1/2 foam at wall: chest opener, bilateral External rotation , opposite overhead/heel reach 10x each   New: bent over row 1lb 10-15x each,  T 10x each 0lbs  New: Left shoulder PNF patterns with manual therapist resistance   Scapular PNF pattern with manual therapist resistance     Skilled input: verbal instruction/cues, tactile instruction/cues, posture correction, demonstration and as detailed above    Writer verbally educated and received verbal consent for hand placement, positioning of patient, and techniques to be performed today from  patient for clothing adjustments for techniques, therapist position for techniques and hand placement and palpation for techniques as described above and how they are pertinent to the patient's plan of care.  Home Exercise Program  Access Code: [email or web address]  URL: [email or web address]  Date: 04/08/2025  Prepared by: Cheryl Palafox    Exercises  - Scapular Protraction at Wall  - 1 x daily - 10 reps  - Shoulder External Rotation  - 3-4 x weekly - 1-3 sets - 10 reps  - Standing Shoulder Scaption  - 3-4 x weekly - 1-3 sets - 10 reps  - Scaption Wall Slide with Towel  - 1 x daily - 1-3 sets - 10 reps  - Seated Scapular Depression Pushing Into Ball/Block  - 1 x daily - 1-3 sets - 10 reps  - Standing Thoracic Open Book at Wall  - 3-4 x weekly - 5 reps - 10 seconds hold      ASSESSMENT                                                                                                            Herbert has completed 5 sessions of physical therapy for her complaint of shoulder pain.  She has made some gains in skilled therapy to date as expected in strength, range of motion, improved functional outcomes scores and decreased pain.  Patient has been consistent with attendance to physical therapy and home exercise program.  Patient  has made steady progress  toward discharge/long term goals (see below for specific status updates):    She however, has not met all goals as she continues to have shoulder pain and limitations with activities such as pulling up the covers.  Patient also continues to be tight through upper trapezius and accessory breathing musculature and benefit from following up with referring provider is symptoms continue.   Education:   - Results of above outlined education: Verbalizes understanding and Demonstrates understanding    PLAN                                                                                                                           Discharge from skilled therapy with  instructions/recommendations to: continue home exercise program, follow up with referring provider    Goals  Long Term Goals: to be met by end of plan of care  1. Patient will reach overhead with proper scapulohumeral rhythm, with patient reported manageable pain/symptoms of 2/10 for moving dishes/objects in and out of cabinets.  Status: met  Met per patient report   2. Patient will report ability to pull up covers on bed with reported 2/10 for sleeping and household tasks. Status: not metStill painful 7-8/10 and progressively worsens as the night goes on   3. Patient will complete upper body dressing without compensatory techniques independently, with patient reported manageable pain/symptoms of 2/10.  Status: partially met  Still difficulty and adaptations with pulling up pants  4. Patient will demonstrate or report hair washing/styling for 60 seconds, 2 times, with patient reported manageable pain/symptoms of 2/10 in order to wash hair without compensation or limitation.   Status: met  Able to complete without symptoms   5. Patient will be independent with progressed and modified home exercise program. Status: met  Godinez is consistent with HEP      Therapy procedure time and total treatment time can be found documented on the Time Entry flowsheet

## (undated) DEVICE — TROCAR: Brand: KII SHIELDED BLADED ACCESS SYSTEM

## (undated) DEVICE — TROCAR: Brand: KII® OPTICAL ACCESS SYSTEM

## (undated) DEVICE — Device

## (undated) DEVICE — [HIGH FLOW INSUFFLATOR,  DO NOT USE IF PACKAGE IS DAMAGED,  KEEP DRY,  KEEP AWAY FROM SUNLIGHT,  PROTECT FROM HEAT AND RADIOACTIVE SOURCES.]: Brand: PNEUMOSURE

## (undated) DEVICE — SEALANT HEMSTAT 5ML HUM FIBRIN THROM 2 VI APPL DEV EVICEL

## (undated) DEVICE — (D)DRSG BORD MPLX SACRUM 23X23 -- DISC BY MFR USE ITEM 340908

## (undated) DEVICE — WAX SURG 2.5GM HEMSTAT BNE BEESWAX PARAFFIN ISO PALMITATE

## (undated) DEVICE — ENDO CARRY-ON PROCEDURE KIT INCLUDES ENZYMATIC SPONGE, GAUZE, BIOHAZARD LABEL, TRAY, LUBRICANT, DIRTY SCOPE LABEL, WATER LABEL, TRAY, DRAWSTRING PAD, AND DEFENDO 4-PIECE KIT.: Brand: ENDO CARRY-ON PROCEDURE KIT

## (undated) DEVICE — SOLUTION LACTATED RINGERS INJECTION USP

## (undated) DEVICE — BARIATRIC: Brand: MEDLINE INDUSTRIES, INC.

## (undated) DEVICE — GARMENT,MEDLINE,DVT,INT,CALF,MED, GEN2: Brand: MEDLINE

## (undated) DEVICE — SUT MONOCRYL PLUS UD 4-0 --

## (undated) DEVICE — TIP APPL L35CM RIG FOR SEAL EVICEL

## (undated) DEVICE — AGENT HEMSTAT W6XL9IN OXIDIZED REGENERATED CELOS ABSRB FOR

## (undated) DEVICE — SPONGE DISSECT PNUT SM 3/8IN -- 5/PK

## (undated) DEVICE — TROCARS: Brand: KII® OPTICAL ACCESS SYSTEM

## (undated) DEVICE — RELOAD STPL H4.1X2MM DIA60MM THCK TISS GRN 6 ROW PWR GST B

## (undated) DEVICE — 4-PORT MANIFOLD: Brand: NEPTUNE 2

## (undated) DEVICE — PREP SKN PREVAIL 40ML APPL --

## (undated) DEVICE — STERILE POLYISOPRENE POWDER-FREE SURGICAL GLOVES: Brand: PROTEXIS

## (undated) DEVICE — INSULATED BLADE ELECTRODE: Brand: EDGE

## (undated) DEVICE — TROCAR: Brand: KII® SLEEVE

## (undated) DEVICE — VISUALIZATION SYSTEM: Brand: CLEARIFY

## (undated) DEVICE — INTENDED FOR TISSUE SEPARATION, AND OTHER PROCEDURES THAT REQUIRE A SHARP SURGICAL BLADE TO PUNCTURE OR CUT.: Brand: BARD-PARKER SAFETY BLADES SIZE 10, STERILE

## (undated) DEVICE — INTENDED FOR TISSUE SEPARATION, AND OTHER PROCEDURES THAT REQUIRE A SHARP SURGICAL BLADE TO PUNCTURE OR CUT.: Brand: BARD-PARKER SAFETY BLADES SIZE 15, STERILE

## (undated) DEVICE — SUTURE VCRL SZ 3-0 L18IN ABSRB VLT L26MM SH 1/2 CIR J774D

## (undated) DEVICE — MAJ-1414 SINGLE USE ADPATER BIOPSY VALV: Brand: SINGLE USE ADAPTOR BIOPSY VALVE

## (undated) DEVICE — SUTURE ETHIB EXCL BR GRN TAPR PT 2-0 30 X563H X563H

## (undated) DEVICE — SUTURE ETHLN SZ 3-0 L30IN NONABSORBABLE BLK FSL L30MM 3/8 1671H

## (undated) DEVICE — STOCKING COMPR L L31-34IN LNG 19MMHG ANK 9-10IN CALF

## (undated) DEVICE — SUTURE MCRYL SZ 4-0 L18IN ABSRB UD L19MM PS-2 3/8 CIR PRIM Y496G

## (undated) DEVICE — KENDALL SCD EXPRESS SLEEVES, KNEE LENGTH, MEDIUM: Brand: KENDALL SCD

## (undated) DEVICE — STAPLER SKIN L440MM 32MM LNG 12 FIRING B FRM PWR + GRIPPING

## (undated) DEVICE — RELOAD STPL L60MM H2.3-4.2MM VERY THCK TISS BLK 6 ROW

## (undated) DEVICE — STRAP,POSITIONING,KNEE/BODY,FOAM,4X60": Brand: MEDLINE

## (undated) DEVICE — SOL IRRIGATION INJ NACL 0.9% 500ML BTL

## (undated) DEVICE — REM POLYHESIVE ADULT PATIENT RETURN ELECTRODE: Brand: VALLEYLAB

## (undated) DEVICE — SYR IRR CATH TIP LR ADPT 70ML -- CONVERT TO ITEM 363120

## (undated) DEVICE — TELFA NON-ADHERENT PADS PREPAK: Brand: TELFA

## (undated) DEVICE — DRAIN SURG 15FR L3/16IN SIL RND 3/4 FLUT 3/16IN TRCR

## (undated) DEVICE — X-RAY SPONGES,12 PLY: Brand: DERMACEA

## (undated) DEVICE — 3M™ TEGADERM™ TRANSPARENT FILM DRESSING FRAME STYLE, 1626W, 4 IN X 4-3/4 IN (10 CM X 12 CM), 50/CT 4CT/CASE: Brand: 3M™ TEGADERM™

## (undated) DEVICE — FORCEPS BX OVL CUP SERR DISP CAP L 240CM RAD JAW 4

## (undated) DEVICE — CERVICAL COLLAR: Brand: DEROYAL

## (undated) DEVICE — SOLUTION IV 1000ML 0.9% SOD CHL

## (undated) DEVICE — 3M™ BAIR PAWS FLEX™ WARMING GOWN, STANDARD, 20 PER CASE 81003: Brand: BAIR PAWS™

## (undated) DEVICE — SYRINGE MED 3ML NDL 22GA L1 1/2IN REG BVL SFGLDE

## (undated) DEVICE — ENDOCUT SCISSOR TIP, DISPOSABLE: Brand: RENEW

## (undated) DEVICE — VISIGI 3D®  CALIBRATION SYSTEM  SIZE 36FR STD W/ BULB: Brand: BOEHRINGER® VISIGI 3D™ SLEEVE GASTRECTOMY CALIBRATION SYSTEM, SIZE 36FR W/BULB

## (undated) DEVICE — APPLIER CLP L SHFT DIA12MM 20 ROT MULT LIGACLP

## (undated) DEVICE — RELOAD STPL L60MM H1.5-3.6MM REG TISS BLU GRIPPING SURF B

## (undated) DEVICE — SHEAR HARMONIC 5MMX45CM -- ACE 7+

## (undated) DEVICE — SUTURE PDS II SZ 0 L27IN ABSRB VLT L26MM CT-2 1/2 CIR Z334H

## (undated) DEVICE — FLEX ADVANTAGE 3000CC: Brand: FLEX ADVANTAGE

## (undated) DEVICE — INSUFFLATION NEEDLE TO ESTABLISH PNEUMOPERITONEUM.: Brand: INSUFFLATION NEEDLE

## (undated) DEVICE — 3.0MM PRECISION NEURO (MATCH HEAD)

## (undated) DEVICE — MEDI-VAC NON-CONDUCTIVE SUCTION TUBING: Brand: CARDINAL HEALTH

## (undated) DEVICE — DERMABOND SKIN ADH 0.7ML -- DERMABOND ADVANCED 12/BX

## (undated) DEVICE — GAUZE SPONGES,USP TYPE VII GAUZE, 12 PLY: Brand: CURITY

## (undated) DEVICE — GOWN ISOLATN REG BLU POLY UNISX W/ THMB LOOP

## (undated) DEVICE — GELFOAM SZ 100 SPNG